# Patient Record
Sex: MALE | Race: WHITE | Employment: OTHER | URBAN - METROPOLITAN AREA
[De-identification: names, ages, dates, MRNs, and addresses within clinical notes are randomized per-mention and may not be internally consistent; named-entity substitution may affect disease eponyms.]

---

## 2020-03-31 ENCOUNTER — HOSPITAL ENCOUNTER (OUTPATIENT)
Dept: CT IMAGING | Age: 70
Discharge: HOME | End: 2020-04-02
Payer: MEDICARE

## 2020-03-31 LAB
CREAT SERPL-MCNC: 0.99 MG/DL (ref 0.7–1.2)
GFR AFRICAN AMERICAN: >60 ML/MIN
GFR NON-AFRICAN AMERICAN: >60 ML/MIN
GFR SERPL CREATININE-BSD FRML MDRD: NORMAL ML/MIN/{1.73_M2}
GFR SERPL CREATININE-BSD FRML MDRD: NORMAL ML/MIN/{1.73_M2}

## 2020-03-31 PROCEDURE — 2580000003 HC RX 258: Performed by: COLON & RECTAL SURGERY

## 2020-03-31 PROCEDURE — 71260 CT THORAX DX C+: CPT

## 2020-03-31 PROCEDURE — 82565 ASSAY OF CREATININE: CPT

## 2020-03-31 PROCEDURE — 6360000004 HC RX CONTRAST MEDICATION: Performed by: COLON & RECTAL SURGERY

## 2020-03-31 PROCEDURE — 36415 COLL VENOUS BLD VENIPUNCTURE: CPT

## 2020-03-31 RX ORDER — 0.9 % SODIUM CHLORIDE 0.9 %
80 INTRAVENOUS SOLUTION INTRAVENOUS ONCE
Status: DISCONTINUED | OUTPATIENT
Start: 2020-03-31 | End: 2020-04-03 | Stop reason: HOSPADM

## 2020-03-31 RX ORDER — SODIUM CHLORIDE 0.9 % (FLUSH) 0.9 %
10 SYRINGE (ML) INJECTION
Status: COMPLETED | OUTPATIENT
Start: 2020-03-31 | End: 2020-03-31

## 2020-03-31 RX ADMIN — Medication 10 ML: at 08:34

## 2020-03-31 RX ADMIN — IOHEXOL 30 ML: 300 INJECTION, SOLUTION INTRAVENOUS at 08:32

## 2020-03-31 RX ADMIN — IOPAMIDOL 75 ML: 755 INJECTION, SOLUTION INTRAVENOUS at 08:32

## 2020-03-31 RX ADMIN — Medication 10 ML: at 08:33

## 2020-05-22 ENCOUNTER — HOSPITAL ENCOUNTER (OUTPATIENT)
Dept: PREADMISSION TESTING | Age: 70
Discharge: HOME | End: 2020-05-26
Payer: MEDICARE

## 2020-05-22 VITALS
OXYGEN SATURATION: 97 % | HEIGHT: 70 IN | WEIGHT: 189 LBS | DIASTOLIC BLOOD PRESSURE: 63 MMHG | HEART RATE: 75 BPM | BODY MASS INDEX: 27.06 KG/M2 | SYSTOLIC BLOOD PRESSURE: 126 MMHG | RESPIRATION RATE: 16 BRPM

## 2020-05-22 LAB
ABO/RH: NORMAL
ANTIBODY SCREEN: NEGATIVE
ARM BAND NUMBER: NORMAL
EXPIRATION DATE: NORMAL
HCT VFR BLD CALC: 49.3 % (ref 40.7–50.3)
HEMOGLOBIN: 16 G/DL (ref 13–17)
MCH RBC QN AUTO: 29.6 PG (ref 25.2–33.5)
MCHC RBC AUTO-ENTMCNC: 32.5 G/DL (ref 28.4–34.8)
MCV RBC AUTO: 91.3 FL (ref 82.6–102.9)
NRBC AUTOMATED: 0 PER 100 WBC
PDW BLD-RTO: 13.4 % (ref 11.8–14.4)
PLATELET # BLD: 226 K/UL (ref 138–453)
PMV BLD AUTO: 10.8 FL (ref 8.1–13.5)
RBC # BLD: 5.4 M/UL (ref 4.21–5.77)
WBC # BLD: 12.8 K/UL (ref 3.5–11.3)

## 2020-05-22 PROCEDURE — 85027 COMPLETE CBC AUTOMATED: CPT

## 2020-05-22 PROCEDURE — 86901 BLOOD TYPING SEROLOGIC RH(D): CPT

## 2020-05-22 PROCEDURE — 93005 ELECTROCARDIOGRAM TRACING: CPT | Performed by: ANESTHESIOLOGY

## 2020-05-22 PROCEDURE — 36415 COLL VENOUS BLD VENIPUNCTURE: CPT

## 2020-05-22 PROCEDURE — 86900 BLOOD TYPING SEROLOGIC ABO: CPT

## 2020-05-22 PROCEDURE — 86850 RBC ANTIBODY SCREEN: CPT

## 2020-05-22 RX ORDER — M-VIT,TX,IRON,MINS/CALC/FOLIC 27MG-0.4MG
1 TABLET ORAL DAILY
COMMUNITY

## 2020-05-22 RX ORDER — ASPIRIN 81 MG/1
81 TABLET, CHEWABLE ORAL DAILY
COMMUNITY

## 2020-05-22 RX ORDER — CLONAZEPAM 0.5 MG/1
0.5 TABLET ORAL 2 TIMES DAILY
COMMUNITY

## 2020-05-22 SDOH — HEALTH STABILITY: MENTAL HEALTH: HOW OFTEN DO YOU HAVE A DRINK CONTAINING ALCOHOL?: NEVER

## 2020-05-22 NOTE — PRE-PROCEDURE INSTRUCTIONS
but do not swallow water.  Bring your eyeglasses and case with you. No contacts are to be worn the day of surgery. You also may bring your hearing aids. Most surgical procedures involving anesthesia will require that you remove your dentures prior to surgery.  If you are on C-PAP or Bi-PAP at home and plan on staying in the hospital overnight for your surgery please bring the machine with you. · Do not wear any jewelry or body piercings day of surgery. Also, NO lotion, perfume or deodorant to be used the day of surgery. No nail polish on the operative extremity (arm/leg surgeries)    · If you are staying overnight with us, please bring a small bag of necessary personal items.  Please wear loose, comfortable clothing. If you are potentially going to have a cast or brace bring clothing that will fit over them.  In case of illness - If you have cold or flu like symptoms (high fever, runny nose, sore throat, cough, etc.) rash, nausea, vomiting, loose stools, and/or recent contact with someone who has a contagious disease (chicken pox, measles, etc.) Please call your doctor before coming to the hospital.         Day of Surgery/Procedure:    As a patient at [facility] - INPATIENT you can expect quality medical and nursing care that is centered on your individual needs. Our goal is to make your surgical experience as comfortable as possible    . Transportation After Your Surgery/Procedure: You will need a friend or family member to drive you home after your procedure. Your  must be 25years of age or older and able to sign off on your discharge instructions. A taxi cab or any other form of public transportation is not acceptable. Your friend or family member must stay at the hospital throughout your procedure.     Someone must remain with you for the first 24 hours after your surgery if you receive anesthesia or medication. If you do not have someone to stay with you, your procedure may be cancelled.       If you have any other questions regarding your procedure or the day of surgery, please call [phone]      _________________________  ____________________________  Signature (Patient)              Signature (Provider) & date

## 2020-05-23 LAB
EKG ATRIAL RATE: 66 BPM
EKG P AXIS: 72 DEGREES
EKG P-R INTERVAL: 152 MS
EKG Q-T INTERVAL: 390 MS
EKG QRS DURATION: 82 MS
EKG QTC CALCULATION (BAZETT): 408 MS
EKG R AXIS: -21 DEGREES
EKG T AXIS: 22 DEGREES
EKG VENTRICULAR RATE: 66 BPM

## 2020-05-23 PROCEDURE — 93010 ELECTROCARDIOGRAM REPORT: CPT | Performed by: INTERNAL MEDICINE

## 2020-05-26 ENCOUNTER — HOSPITAL ENCOUNTER (OUTPATIENT)
Dept: PREADMISSION TESTING | Age: 70
Setting detail: SPECIMEN
Discharge: HOME | DRG: 331 | End: 2020-05-30
Payer: MEDICARE

## 2020-05-26 LAB
SARS-COV-2, PCR: NORMAL
SARS-COV-2, RAPID: NORMAL
SARS-COV-2: NOT DETECTED
SOURCE: NORMAL

## 2020-05-26 PROCEDURE — U0003 INFECTIOUS AGENT DETECTION BY NUCLEIC ACID (DNA OR RNA); SEVERE ACUTE RESPIRATORY SYNDROME CORONAVIRUS 2 (SARS-COV-2) (CORONAVIRUS DISEASE [COVID-19]), AMPLIFIED PROBE TECHNIQUE, MAKING USE OF HIGH THROUGHPUT TECHNOLOGIES AS DESCRIBED BY CMS-2020-01-R: HCPCS

## 2020-05-27 ENCOUNTER — TELEPHONE (OUTPATIENT)
Dept: PRIMARY CARE CLINIC | Age: 70
End: 2020-05-27

## 2020-05-28 ENCOUNTER — HOSPITAL ENCOUNTER (INPATIENT)
Age: 70
LOS: 6 days | Discharge: HOME | DRG: 331 | End: 2020-06-03
Attending: COLON & RECTAL SURGERY | Admitting: COLON & RECTAL SURGERY
Payer: MEDICARE

## 2020-05-28 ENCOUNTER — ANESTHESIA (OUTPATIENT)
Dept: OPERATING ROOM | Age: 70
DRG: 331 | End: 2020-05-28
Payer: MEDICARE

## 2020-05-28 ENCOUNTER — ANESTHESIA EVENT (OUTPATIENT)
Dept: OPERATING ROOM | Age: 70
DRG: 331 | End: 2020-05-28
Payer: MEDICARE

## 2020-05-28 VITALS — TEMPERATURE: 97.3 F | SYSTOLIC BLOOD PRESSURE: 160 MMHG | DIASTOLIC BLOOD PRESSURE: 81 MMHG | OXYGEN SATURATION: 100 %

## 2020-05-28 PROBLEM — K63.5 COLON POLYPS: Status: ACTIVE | Noted: 2020-05-28

## 2020-05-28 LAB
CARCINOEMBRYONIC ANTIGEN: 3.8 NG/ML
GLUCOSE BLD-MCNC: 107 MG/DL (ref 75–110)
GLUCOSE BLD-MCNC: 82 MG/DL (ref 75–110)

## 2020-05-28 PROCEDURE — 6360000002 HC RX W HCPCS: Performed by: ANESTHESIOLOGY

## 2020-05-28 PROCEDURE — 2060000000 HC ICU INTERMEDIATE R&B

## 2020-05-28 PROCEDURE — 88341 IMHCHEM/IMCYTCHM EA ADD ANTB: CPT

## 2020-05-28 PROCEDURE — 3600000002 HC SURGERY LEVEL 2 BASE: Performed by: COLON & RECTAL SURGERY

## 2020-05-28 PROCEDURE — 6360000002 HC RX W HCPCS: Performed by: COLON & RECTAL SURGERY

## 2020-05-28 PROCEDURE — 2580000003 HC RX 258: Performed by: ANESTHESIOLOGY

## 2020-05-28 PROCEDURE — 2580000003 HC RX 258: Performed by: COLON & RECTAL SURGERY

## 2020-05-28 PROCEDURE — 88342 IMHCHEM/IMCYTCHM 1ST ANTB: CPT

## 2020-05-28 PROCEDURE — 7100000000 HC PACU RECOVERY - FIRST 15 MIN: Performed by: COLON & RECTAL SURGERY

## 2020-05-28 PROCEDURE — 3600000012 HC SURGERY LEVEL 2 ADDTL 15MIN: Performed by: COLON & RECTAL SURGERY

## 2020-05-28 PROCEDURE — 6360000002 HC RX W HCPCS: Performed by: NURSE ANESTHETIST, CERTIFIED REGISTERED

## 2020-05-28 PROCEDURE — 64488 TAP BLOCK BI INJECTION: CPT | Performed by: ANESTHESIOLOGY

## 2020-05-28 PROCEDURE — 82378 CARCINOEMBRYONIC ANTIGEN: CPT

## 2020-05-28 PROCEDURE — 88309 TISSUE EXAM BY PATHOLOGIST: CPT

## 2020-05-28 PROCEDURE — 2500000003 HC RX 250 WO HCPCS: Performed by: NURSE ANESTHETIST, CERTIFIED REGISTERED

## 2020-05-28 PROCEDURE — 2720000010 HC SURG SUPPLY STERILE: Performed by: COLON & RECTAL SURGERY

## 2020-05-28 PROCEDURE — 82947 ASSAY GLUCOSE BLOOD QUANT: CPT

## 2020-05-28 PROCEDURE — 3700000001 HC ADD 15 MINUTES (ANESTHESIA): Performed by: COLON & RECTAL SURGERY

## 2020-05-28 PROCEDURE — 2709999900 HC NON-CHARGEABLE SUPPLY: Performed by: COLON & RECTAL SURGERY

## 2020-05-28 PROCEDURE — 3700000000 HC ANESTHESIA ATTENDED CARE: Performed by: COLON & RECTAL SURGERY

## 2020-05-28 PROCEDURE — 2500000003 HC RX 250 WO HCPCS: Performed by: COLON & RECTAL SURGERY

## 2020-05-28 PROCEDURE — 7100000001 HC PACU RECOVERY - ADDTL 15 MIN: Performed by: COLON & RECTAL SURGERY

## 2020-05-28 PROCEDURE — 0DQ80ZZ REPAIR SMALL INTESTINE, OPEN APPROACH: ICD-10-PCS | Performed by: COLON & RECTAL SURGERY

## 2020-05-28 RX ORDER — ONDANSETRON 2 MG/ML
4 INJECTION INTRAMUSCULAR; INTRAVENOUS
Status: DISCONTINUED | OUTPATIENT
Start: 2020-05-28 | End: 2020-05-28 | Stop reason: HOSPADM

## 2020-05-28 RX ORDER — OXYCODONE HYDROCHLORIDE AND ACETAMINOPHEN 5; 325 MG/1; MG/1
1 TABLET ORAL PRN
Status: DISCONTINUED | OUTPATIENT
Start: 2020-05-28 | End: 2020-05-28 | Stop reason: HOSPADM

## 2020-05-28 RX ORDER — SODIUM CHLORIDE 0.9 % (FLUSH) 0.9 %
10 SYRINGE (ML) INJECTION EVERY 12 HOURS SCHEDULED
Status: DISCONTINUED | OUTPATIENT
Start: 2020-05-28 | End: 2020-05-28 | Stop reason: SDUPTHER

## 2020-05-28 RX ORDER — GLYCOPYRROLATE 1 MG/5 ML
SYRINGE (ML) INTRAVENOUS PRN
Status: DISCONTINUED | OUTPATIENT
Start: 2020-05-28 | End: 2020-05-28 | Stop reason: SDUPTHER

## 2020-05-28 RX ORDER — NEOSTIGMINE METHYLSULFATE 5 MG/5 ML
SYRINGE (ML) INTRAVENOUS PRN
Status: DISCONTINUED | OUTPATIENT
Start: 2020-05-28 | End: 2020-05-28 | Stop reason: SDUPTHER

## 2020-05-28 RX ORDER — FENTANYL CITRATE 50 UG/ML
INJECTION, SOLUTION INTRAMUSCULAR; INTRAVENOUS PRN
Status: DISCONTINUED | OUTPATIENT
Start: 2020-05-28 | End: 2020-05-28 | Stop reason: SDUPTHER

## 2020-05-28 RX ORDER — ONDANSETRON 2 MG/ML
INJECTION INTRAMUSCULAR; INTRAVENOUS PRN
Status: DISCONTINUED | OUTPATIENT
Start: 2020-05-28 | End: 2020-05-28 | Stop reason: SDUPTHER

## 2020-05-28 RX ORDER — FENTANYL CITRATE 50 UG/ML
25 INJECTION, SOLUTION INTRAMUSCULAR; INTRAVENOUS EVERY 5 MIN PRN
Status: DISCONTINUED | OUTPATIENT
Start: 2020-05-28 | End: 2020-05-28 | Stop reason: HOSPADM

## 2020-05-28 RX ORDER — CLONAZEPAM 0.5 MG/1
0.5 TABLET ORAL NIGHTLY PRN
Status: DISCONTINUED | OUTPATIENT
Start: 2020-05-28 | End: 2020-05-29

## 2020-05-28 RX ORDER — PROPOFOL 10 MG/ML
INJECTION, EMULSION INTRAVENOUS PRN
Status: DISCONTINUED | OUTPATIENT
Start: 2020-05-28 | End: 2020-05-28 | Stop reason: SDUPTHER

## 2020-05-28 RX ORDER — ROCURONIUM BROMIDE 10 MG/ML
INJECTION, SOLUTION INTRAVENOUS PRN
Status: DISCONTINUED | OUTPATIENT
Start: 2020-05-28 | End: 2020-05-28 | Stop reason: SDUPTHER

## 2020-05-28 RX ORDER — OXYCODONE HYDROCHLORIDE AND ACETAMINOPHEN 5; 325 MG/1; MG/1
2 TABLET ORAL PRN
Status: DISCONTINUED | OUTPATIENT
Start: 2020-05-28 | End: 2020-05-28 | Stop reason: HOSPADM

## 2020-05-28 RX ORDER — CEFAZOLIN SODIUM 2 G/50ML
2 SOLUTION INTRAVENOUS ONCE
Status: COMPLETED | OUTPATIENT
Start: 2020-05-28 | End: 2020-05-28

## 2020-05-28 RX ORDER — SODIUM CHLORIDE, SODIUM LACTATE, POTASSIUM CHLORIDE, CALCIUM CHLORIDE 600; 310; 30; 20 MG/100ML; MG/100ML; MG/100ML; MG/100ML
INJECTION, SOLUTION INTRAVENOUS CONTINUOUS
Status: DISCONTINUED | OUTPATIENT
Start: 2020-05-28 | End: 2020-05-28 | Stop reason: SDUPTHER

## 2020-05-28 RX ORDER — SODIUM CHLORIDE, SODIUM LACTATE, POTASSIUM CHLORIDE, CALCIUM CHLORIDE 600; 310; 30; 20 MG/100ML; MG/100ML; MG/100ML; MG/100ML
INJECTION, SOLUTION INTRAVENOUS CONTINUOUS
Status: DISCONTINUED | OUTPATIENT
Start: 2020-05-28 | End: 2020-06-01

## 2020-05-28 RX ORDER — SODIUM CHLORIDE 0.9 % (FLUSH) 0.9 %
10 SYRINGE (ML) INJECTION PRN
Status: DISCONTINUED | OUTPATIENT
Start: 2020-05-28 | End: 2020-05-28 | Stop reason: SDUPTHER

## 2020-05-28 RX ORDER — LIDOCAINE HYDROCHLORIDE 10 MG/ML
1 INJECTION, SOLUTION EPIDURAL; INFILTRATION; INTRACAUDAL; PERINEURAL
Status: DISCONTINUED | OUTPATIENT
Start: 2020-05-29 | End: 2020-05-28 | Stop reason: HOSPADM

## 2020-05-28 RX ORDER — LABETALOL HYDROCHLORIDE 5 MG/ML
5 INJECTION, SOLUTION INTRAVENOUS EVERY 10 MIN PRN
Status: DISCONTINUED | OUTPATIENT
Start: 2020-05-28 | End: 2020-05-28 | Stop reason: HOSPADM

## 2020-05-28 RX ORDER — HYDROMORPHONE HCL 110MG/55ML
0.5 PATIENT CONTROLLED ANALGESIA SYRINGE INTRAVENOUS EVERY 5 MIN PRN
Status: DISCONTINUED | OUTPATIENT
Start: 2020-05-28 | End: 2020-05-28 | Stop reason: HOSPADM

## 2020-05-28 RX ORDER — DEXAMETHASONE SODIUM PHOSPHATE 10 MG/ML
INJECTION INTRAMUSCULAR; INTRAVENOUS PRN
Status: DISCONTINUED | OUTPATIENT
Start: 2020-05-28 | End: 2020-05-28 | Stop reason: SDUPTHER

## 2020-05-28 RX ORDER — SODIUM CHLORIDE, SODIUM LACTATE, POTASSIUM CHLORIDE, CALCIUM CHLORIDE 600; 310; 30; 20 MG/100ML; MG/100ML; MG/100ML; MG/100ML
INJECTION, SOLUTION INTRAVENOUS CONTINUOUS
Status: DISCONTINUED | OUTPATIENT
Start: 2020-05-29 | End: 2020-06-01

## 2020-05-28 RX ORDER — CEFAZOLIN SODIUM 2 G/50ML
2 SOLUTION INTRAVENOUS ONCE
Status: DISCONTINUED | OUTPATIENT
Start: 2020-05-28 | End: 2020-05-28 | Stop reason: SDUPTHER

## 2020-05-28 RX ORDER — SODIUM CHLORIDE 0.9 % (FLUSH) 0.9 %
10 SYRINGE (ML) INJECTION PRN
Status: DISCONTINUED | OUTPATIENT
Start: 2020-05-28 | End: 2020-06-03 | Stop reason: HOSPADM

## 2020-05-28 RX ORDER — HYDRALAZINE HYDROCHLORIDE 20 MG/ML
5 INJECTION INTRAMUSCULAR; INTRAVENOUS EVERY 10 MIN PRN
Status: DISCONTINUED | OUTPATIENT
Start: 2020-05-28 | End: 2020-05-28 | Stop reason: HOSPADM

## 2020-05-28 RX ORDER — PHENYLEPHRINE HCL IN 0.9% NACL 1 MG/10 ML
SYRINGE (ML) INTRAVENOUS PRN
Status: DISCONTINUED | OUTPATIENT
Start: 2020-05-28 | End: 2020-05-28 | Stop reason: SDUPTHER

## 2020-05-28 RX ORDER — MIDAZOLAM HYDROCHLORIDE 1 MG/ML
INJECTION INTRAMUSCULAR; INTRAVENOUS PRN
Status: DISCONTINUED | OUTPATIENT
Start: 2020-05-28 | End: 2020-05-28 | Stop reason: SDUPTHER

## 2020-05-28 RX ORDER — ROPIVACAINE HYDROCHLORIDE 2 MG/ML
INJECTION, SOLUTION EPIDURAL; INFILTRATION; PERINEURAL PRN
Status: DISCONTINUED | OUTPATIENT
Start: 2020-05-28 | End: 2020-05-28 | Stop reason: SDUPTHER

## 2020-05-28 RX ORDER — LIDOCAINE HYDROCHLORIDE 20 MG/ML
INJECTION, SOLUTION EPIDURAL; INFILTRATION; INTRACAUDAL; PERINEURAL PRN
Status: DISCONTINUED | OUTPATIENT
Start: 2020-05-28 | End: 2020-05-28 | Stop reason: SDUPTHER

## 2020-05-28 RX ORDER — PROMETHAZINE HYDROCHLORIDE 25 MG/ML
6.25 INJECTION, SOLUTION INTRAMUSCULAR; INTRAVENOUS
Status: DISCONTINUED | OUTPATIENT
Start: 2020-05-28 | End: 2020-05-28 | Stop reason: HOSPADM

## 2020-05-28 RX ORDER — SODIUM CHLORIDE 0.9 % (FLUSH) 0.9 %
10 SYRINGE (ML) INJECTION EVERY 12 HOURS SCHEDULED
Status: DISCONTINUED | OUTPATIENT
Start: 2020-05-28 | End: 2020-06-03 | Stop reason: HOSPADM

## 2020-05-28 RX ADMIN — ROPIVACAINE HYDROCHLORIDE 25 ML: 2 INJECTION, SOLUTION EPIDURAL; INFILTRATION at 15:07

## 2020-05-28 RX ADMIN — Medication 100 MCG: at 12:48

## 2020-05-28 RX ADMIN — CEFAZOLIN SODIUM 2 G: 2 SOLUTION INTRAVENOUS at 12:25

## 2020-05-28 RX ADMIN — SODIUM CHLORIDE, POTASSIUM CHLORIDE, SODIUM LACTATE AND CALCIUM CHLORIDE: 600; 310; 30; 20 INJECTION, SOLUTION INTRAVENOUS at 16:45

## 2020-05-28 RX ADMIN — DEXAMETHASONE SODIUM PHOSPHATE 10 MG: 10 INJECTION INTRAMUSCULAR; INTRAVENOUS at 12:32

## 2020-05-28 RX ADMIN — HYDROMORPHONE HYDROCHLORIDE 0.5 MG: 2 INJECTION INTRAMUSCULAR; INTRAVENOUS; SUBCUTANEOUS at 15:48

## 2020-05-28 RX ADMIN — SODIUM CHLORIDE, POTASSIUM CHLORIDE, SODIUM LACTATE AND CALCIUM CHLORIDE: 600; 310; 30; 20 INJECTION, SOLUTION INTRAVENOUS at 15:01

## 2020-05-28 RX ADMIN — FENTANYL CITRATE 50 MCG: 50 INJECTION, SOLUTION INTRAMUSCULAR; INTRAVENOUS at 14:16

## 2020-05-28 RX ADMIN — Medication 100 MCG: at 13:00

## 2020-05-28 RX ADMIN — HYDROMORPHONE HYDROCHLORIDE 0.5 MG: 2 INJECTION INTRAMUSCULAR; INTRAVENOUS; SUBCUTANEOUS at 15:40

## 2020-05-28 RX ADMIN — MIDAZOLAM 2 MG: 1 INJECTION INTRAMUSCULAR; INTRAVENOUS at 12:14

## 2020-05-28 RX ADMIN — SODIUM CHLORIDE, POTASSIUM CHLORIDE, SODIUM LACTATE AND CALCIUM CHLORIDE: 600; 310; 30; 20 INJECTION, SOLUTION INTRAVENOUS at 12:46

## 2020-05-28 RX ADMIN — Medication 100 MCG: at 12:38

## 2020-05-28 RX ADMIN — ROCURONIUM BROMIDE 20 MG: 10 INJECTION, SOLUTION INTRAVENOUS at 13:34

## 2020-05-28 RX ADMIN — Medication 3 MG: at 15:07

## 2020-05-28 RX ADMIN — SODIUM CHLORIDE, POTASSIUM CHLORIDE, SODIUM LACTATE AND CALCIUM CHLORIDE: 600; 310; 30; 20 INJECTION, SOLUTION INTRAVENOUS at 12:14

## 2020-05-28 RX ADMIN — Medication 200 MCG: at 12:52

## 2020-05-28 RX ADMIN — FENTANYL CITRATE 150 MCG: 50 INJECTION, SOLUTION INTRAMUSCULAR; INTRAVENOUS at 12:19

## 2020-05-28 RX ADMIN — PROPOFOL 200 MG: 10 INJECTION, EMULSION INTRAVENOUS at 12:19

## 2020-05-28 RX ADMIN — METRONIDAZOLE 500 MG: 500 INJECTION, SOLUTION INTRAVENOUS at 12:31

## 2020-05-28 RX ADMIN — CEFOXITIN SODIUM 2 G: 2 POWDER, FOR SOLUTION INTRAVENOUS at 19:51

## 2020-05-28 RX ADMIN — ROPIVACAINE HYDROCHLORIDE 25 ML: 2 INJECTION, SOLUTION EPIDURAL; INFILTRATION at 15:04

## 2020-05-28 RX ADMIN — Medication 0.4 MG: at 15:07

## 2020-05-28 RX ADMIN — SODIUM CHLORIDE, POTASSIUM CHLORIDE, SODIUM LACTATE AND CALCIUM CHLORIDE: 600; 310; 30; 20 INJECTION, SOLUTION INTRAVENOUS at 23:34

## 2020-05-28 RX ADMIN — HYDROMORPHONE HYDROCHLORIDE 0.5 MG: 1 INJECTION, SOLUTION INTRAMUSCULAR; INTRAVENOUS; SUBCUTANEOUS at 19:48

## 2020-05-28 RX ADMIN — FENTANYL CITRATE 50 MCG: 50 INJECTION, SOLUTION INTRAMUSCULAR; INTRAVENOUS at 13:05

## 2020-05-28 RX ADMIN — HYDROMORPHONE HYDROCHLORIDE 0.5 MG: 2 INJECTION INTRAMUSCULAR; INTRAVENOUS; SUBCUTANEOUS at 16:14

## 2020-05-28 RX ADMIN — ROCURONIUM BROMIDE 50 MG: 10 INJECTION, SOLUTION INTRAVENOUS at 12:19

## 2020-05-28 RX ADMIN — Medication 100 MCG: at 12:23

## 2020-05-28 RX ADMIN — ONDANSETRON 4 MG: 2 INJECTION, SOLUTION INTRAMUSCULAR; INTRAVENOUS at 12:32

## 2020-05-28 RX ADMIN — HYDROMORPHONE HYDROCHLORIDE 0.25 MG: 1 INJECTION, SOLUTION INTRAMUSCULAR; INTRAVENOUS; SUBCUTANEOUS at 23:29

## 2020-05-28 RX ADMIN — Medication 0.2 MG: at 12:47

## 2020-05-28 RX ADMIN — LIDOCAINE HYDROCHLORIDE 100 MG: 20 INJECTION, SOLUTION EPIDURAL; INFILTRATION; INTRACAUDAL; PERINEURAL at 12:19

## 2020-05-28 ASSESSMENT — PULMONARY FUNCTION TESTS
PIF_VALUE: 17
PIF_VALUE: 19
PIF_VALUE: 14
PIF_VALUE: 19
PIF_VALUE: 17
PIF_VALUE: 18
PIF_VALUE: 19
PIF_VALUE: 1
PIF_VALUE: 1
PIF_VALUE: 18
PIF_VALUE: 17
PIF_VALUE: 2
PIF_VALUE: 16
PIF_VALUE: 1
PIF_VALUE: 17
PIF_VALUE: 21
PIF_VALUE: 18
PIF_VALUE: 18
PIF_VALUE: 19
PIF_VALUE: 17
PIF_VALUE: 19
PIF_VALUE: 18
PIF_VALUE: 17
PIF_VALUE: 18
PIF_VALUE: 18
PIF_VALUE: 1
PIF_VALUE: 17
PIF_VALUE: 18
PIF_VALUE: 19
PIF_VALUE: 19
PIF_VALUE: 15
PIF_VALUE: 19
PIF_VALUE: 15
PIF_VALUE: 19
PIF_VALUE: 15
PIF_VALUE: 18
PIF_VALUE: 20
PIF_VALUE: 15
PIF_VALUE: 16
PIF_VALUE: 19
PIF_VALUE: 15
PIF_VALUE: 19
PIF_VALUE: 19
PIF_VALUE: 15
PIF_VALUE: 20
PIF_VALUE: 26
PIF_VALUE: 16
PIF_VALUE: 17
PIF_VALUE: 19
PIF_VALUE: 19
PIF_VALUE: 16
PIF_VALUE: 18
PIF_VALUE: 17
PIF_VALUE: 15
PIF_VALUE: 19
PIF_VALUE: 17
PIF_VALUE: 15
PIF_VALUE: 18
PIF_VALUE: 18
PIF_VALUE: 17
PIF_VALUE: 14
PIF_VALUE: 20
PIF_VALUE: 19
PIF_VALUE: 17
PIF_VALUE: 9
PIF_VALUE: 19
PIF_VALUE: 18
PIF_VALUE: 17
PIF_VALUE: 15
PIF_VALUE: 19
PIF_VALUE: 19
PIF_VALUE: 20
PIF_VALUE: 19
PIF_VALUE: 17
PIF_VALUE: 19
PIF_VALUE: 18
PIF_VALUE: 16
PIF_VALUE: 15
PIF_VALUE: 14
PIF_VALUE: 15
PIF_VALUE: 17
PIF_VALUE: 19
PIF_VALUE: 16
PIF_VALUE: 14
PIF_VALUE: 17
PIF_VALUE: 15
PIF_VALUE: 1
PIF_VALUE: 14
PIF_VALUE: 19
PIF_VALUE: 14
PIF_VALUE: 19
PIF_VALUE: 19
PIF_VALUE: 17
PIF_VALUE: 18
PIF_VALUE: 17
PIF_VALUE: 19
PIF_VALUE: 14
PIF_VALUE: 15
PIF_VALUE: 17
PIF_VALUE: 19
PIF_VALUE: 18
PIF_VALUE: 19
PIF_VALUE: 15
PIF_VALUE: 1
PIF_VALUE: 19
PIF_VALUE: 18
PIF_VALUE: 14
PIF_VALUE: 19
PIF_VALUE: 16
PIF_VALUE: 19
PIF_VALUE: 18
PIF_VALUE: 19
PIF_VALUE: 17
PIF_VALUE: 19
PIF_VALUE: 15
PIF_VALUE: 18
PIF_VALUE: 16
PIF_VALUE: 17
PIF_VALUE: 19
PIF_VALUE: 20
PIF_VALUE: 8
PIF_VALUE: 16
PIF_VALUE: 17
PIF_VALUE: 16
PIF_VALUE: 15
PIF_VALUE: 17
PIF_VALUE: 18
PIF_VALUE: 17
PIF_VALUE: 19
PIF_VALUE: 19
PIF_VALUE: 3
PIF_VALUE: 14
PIF_VALUE: 17
PIF_VALUE: 16
PIF_VALUE: 15
PIF_VALUE: 18
PIF_VALUE: 17
PIF_VALUE: 17
PIF_VALUE: 20
PIF_VALUE: 19
PIF_VALUE: 18
PIF_VALUE: 15
PIF_VALUE: 18
PIF_VALUE: 17
PIF_VALUE: 19
PIF_VALUE: 18
PIF_VALUE: 0
PIF_VALUE: 20
PIF_VALUE: 18
PIF_VALUE: 18
PIF_VALUE: 15
PIF_VALUE: 15
PIF_VALUE: 17
PIF_VALUE: 19
PIF_VALUE: 0
PIF_VALUE: 17
PIF_VALUE: 34
PIF_VALUE: 19
PIF_VALUE: 17
PIF_VALUE: 16
PIF_VALUE: 19
PIF_VALUE: 10
PIF_VALUE: 15
PIF_VALUE: 19
PIF_VALUE: 19
PIF_VALUE: 15
PIF_VALUE: 17

## 2020-05-28 ASSESSMENT — PAIN DESCRIPTION - LOCATION: LOCATION: ABDOMEN

## 2020-05-28 ASSESSMENT — PAIN SCALES - GENERAL
PAINLEVEL_OUTOF10: 10
PAINLEVEL_OUTOF10: 6

## 2020-05-28 ASSESSMENT — PAIN DESCRIPTION - FREQUENCY: FREQUENCY: CONTINUOUS

## 2020-05-28 ASSESSMENT — PAIN DESCRIPTION - ONSET: ONSET: ON-GOING

## 2020-05-28 ASSESSMENT — PAIN DESCRIPTION - PROGRESSION: CLINICAL_PROGRESSION: GRADUALLY WORSENING

## 2020-05-28 ASSESSMENT — PAIN DESCRIPTION - DESCRIPTORS: DESCRIPTORS: ACHING

## 2020-05-28 ASSESSMENT — PAIN DESCRIPTION - PAIN TYPE: TYPE: SURGICAL PAIN

## 2020-05-28 ASSESSMENT — LIFESTYLE VARIABLES: SMOKING_STATUS: 1

## 2020-05-29 LAB
ANION GAP SERPL CALCULATED.3IONS-SCNC: 12 MMOL/L (ref 9–17)
BUN BLDV-MCNC: 13 MG/DL (ref 8–23)
BUN/CREAT BLD: 14 (ref 9–20)
CALCIUM SERPL-MCNC: 8.5 MG/DL (ref 8.6–10.4)
CHLORIDE BLD-SCNC: 103 MMOL/L (ref 98–107)
CO2: 21 MMOL/L (ref 20–31)
CREAT SERPL-MCNC: 0.93 MG/DL (ref 0.7–1.2)
GFR AFRICAN AMERICAN: >60 ML/MIN
GFR NON-AFRICAN AMERICAN: >60 ML/MIN
GFR SERPL CREATININE-BSD FRML MDRD: ABNORMAL ML/MIN/{1.73_M2}
GFR SERPL CREATININE-BSD FRML MDRD: ABNORMAL ML/MIN/{1.73_M2}
GLUCOSE BLD-MCNC: 116 MG/DL (ref 70–99)
HCT VFR BLD CALC: 45.4 % (ref 40.7–50.3)
HEMOGLOBIN: 14.9 G/DL (ref 13–17)
MCH RBC QN AUTO: 29.9 PG (ref 25.2–33.5)
MCHC RBC AUTO-ENTMCNC: 32.8 G/DL (ref 28.4–34.8)
MCV RBC AUTO: 91 FL (ref 82.6–102.9)
NRBC AUTOMATED: 0 PER 100 WBC
PDW BLD-RTO: 13.2 % (ref 11.8–14.4)
PLATELET # BLD: 193 K/UL (ref 138–453)
PMV BLD AUTO: 10.9 FL (ref 8.1–13.5)
POTASSIUM SERPL-SCNC: 4.9 MMOL/L (ref 3.7–5.3)
RBC # BLD: 4.99 M/UL (ref 4.21–5.77)
SODIUM BLD-SCNC: 136 MMOL/L (ref 135–144)
WBC # BLD: 20.7 K/UL (ref 3.5–11.3)

## 2020-05-29 PROCEDURE — 6360000002 HC RX W HCPCS: Performed by: COLON & RECTAL SURGERY

## 2020-05-29 PROCEDURE — 51798 US URINE CAPACITY MEASURE: CPT

## 2020-05-29 PROCEDURE — 2580000003 HC RX 258: Performed by: COLON & RECTAL SURGERY

## 2020-05-29 PROCEDURE — 2580000003 HC RX 258: Performed by: ANESTHESIOLOGY

## 2020-05-29 PROCEDURE — 80048 BASIC METABOLIC PNL TOTAL CA: CPT

## 2020-05-29 PROCEDURE — 97530 THERAPEUTIC ACTIVITIES: CPT

## 2020-05-29 PROCEDURE — 97166 OT EVAL MOD COMPLEX 45 MIN: CPT

## 2020-05-29 PROCEDURE — 97535 SELF CARE MNGMENT TRAINING: CPT

## 2020-05-29 PROCEDURE — 6370000000 HC RX 637 (ALT 250 FOR IP): Performed by: COLON & RECTAL SURGERY

## 2020-05-29 PROCEDURE — 97116 GAIT TRAINING THERAPY: CPT

## 2020-05-29 PROCEDURE — 36415 COLL VENOUS BLD VENIPUNCTURE: CPT

## 2020-05-29 PROCEDURE — 85027 COMPLETE CBC AUTOMATED: CPT

## 2020-05-29 PROCEDURE — 2060000000 HC ICU INTERMEDIATE R&B

## 2020-05-29 PROCEDURE — 97162 PT EVAL MOD COMPLEX 30 MIN: CPT

## 2020-05-29 RX ORDER — NICOTINE 21 MG/24HR
1 PATCH, TRANSDERMAL 24 HOURS TRANSDERMAL DAILY
Status: DISCONTINUED | OUTPATIENT
Start: 2020-05-29 | End: 2020-06-03 | Stop reason: HOSPADM

## 2020-05-29 RX ORDER — CLONAZEPAM 0.5 MG/1
0.5 TABLET ORAL 2 TIMES DAILY PRN
Status: DISCONTINUED | OUTPATIENT
Start: 2020-05-29 | End: 2020-06-03 | Stop reason: HOSPADM

## 2020-05-29 RX ADMIN — HYDROMORPHONE HYDROCHLORIDE 0.5 MG: 1 INJECTION, SOLUTION INTRAMUSCULAR; INTRAVENOUS; SUBCUTANEOUS at 07:23

## 2020-05-29 RX ADMIN — CEFOXITIN SODIUM 2 G: 2 POWDER, FOR SOLUTION INTRAVENOUS at 00:27

## 2020-05-29 RX ADMIN — HYDROMORPHONE HYDROCHLORIDE 0.5 MG: 1 INJECTION, SOLUTION INTRAMUSCULAR; INTRAVENOUS; SUBCUTANEOUS at 03:45

## 2020-05-29 RX ADMIN — HYDROMORPHONE HYDROCHLORIDE 0.5 MG: 1 INJECTION, SOLUTION INTRAMUSCULAR; INTRAVENOUS; SUBCUTANEOUS at 11:01

## 2020-05-29 RX ADMIN — HYDROMORPHONE HYDROCHLORIDE 0.5 MG: 1 INJECTION, SOLUTION INTRAMUSCULAR; INTRAVENOUS; SUBCUTANEOUS at 14:03

## 2020-05-29 RX ADMIN — CLONAZEPAM 0.5 MG: 0.5 TABLET ORAL at 21:11

## 2020-05-29 RX ADMIN — CEFOXITIN SODIUM 2 G: 2 POWDER, FOR SOLUTION INTRAVENOUS at 05:45

## 2020-05-29 RX ADMIN — HYDROMORPHONE HYDROCHLORIDE 0.5 MG: 1 INJECTION, SOLUTION INTRAMUSCULAR; INTRAVENOUS; SUBCUTANEOUS at 20:45

## 2020-05-29 RX ADMIN — HYDROMORPHONE HYDROCHLORIDE 0.5 MG: 1 INJECTION, SOLUTION INTRAMUSCULAR; INTRAVENOUS; SUBCUTANEOUS at 19:36

## 2020-05-29 RX ADMIN — CLONAZEPAM 0.5 MG: 0.5 TABLET ORAL at 15:47

## 2020-05-29 RX ADMIN — SODIUM CHLORIDE, POTASSIUM CHLORIDE, SODIUM LACTATE AND CALCIUM CHLORIDE: 600; 310; 30; 20 INJECTION, SOLUTION INTRAVENOUS at 00:27

## 2020-05-29 ASSESSMENT — PAIN DESCRIPTION - FREQUENCY
FREQUENCY: CONTINUOUS

## 2020-05-29 ASSESSMENT — PAIN DESCRIPTION - LOCATION
LOCATION: ABDOMEN

## 2020-05-29 ASSESSMENT — PAIN SCALES - GENERAL
PAINLEVEL_OUTOF10: 10
PAINLEVEL_OUTOF10: 10
PAINLEVEL_OUTOF10: 7
PAINLEVEL_OUTOF10: 7
PAINLEVEL_OUTOF10: 10
PAINLEVEL_OUTOF10: 9
PAINLEVEL_OUTOF10: 0
PAINLEVEL_OUTOF10: 10
PAINLEVEL_OUTOF10: 10

## 2020-05-29 ASSESSMENT — PAIN DESCRIPTION - DESCRIPTORS
DESCRIPTORS: CRAMPING;SORE
DESCRIPTORS: CRAMPING;SORE
DESCRIPTORS: ACHING

## 2020-05-29 ASSESSMENT — PAIN DESCRIPTION - ONSET
ONSET: ON-GOING

## 2020-05-29 ASSESSMENT — PAIN DESCRIPTION - PROGRESSION: CLINICAL_PROGRESSION: GRADUALLY WORSENING

## 2020-05-29 ASSESSMENT — PAIN DESCRIPTION - PAIN TYPE
TYPE: SURGICAL PAIN

## 2020-05-29 NOTE — PLAN OF CARE
Problem: Bowel/Gastric:  Goal: Control of bowel function will improve  Description: Control of bowel function will improve  Outcome: Ongoing  Goal: Ability to achieve a regular elimination pattern will improve  Description: Ability to achieve a regular elimination pattern will improve  Outcome: Ongoing     Problem: Nutritional:  Goal: Ability to follow a diet with enough fiber (20 to 30 grams) for normal bowel function will improve  Description: Ability to follow a diet with enough fiber (20 to 30 grams) for normal bowel function will improve  Outcome: Ongoing  Goal: Nutritional status will improve  Description: Nutritional status will improve  Outcome: Ongoing  Pt is NPO with ice chips. No Nausea or vomiting with ice chips post NG removal. Will continue to monitor       Problem: Skin Integrity:  Goal: Risk for impaired skin integrity will decrease  Description: Risk for impaired skin integrity will decrease  5/29/2020 1220 by Lydia Lesches, RN  Outcome: Ongoing    Goal: Demonstration of wound healing without infection will improve  Description: Demonstration of wound healing without infection will improve  Outcome: Ongoing  Goal: Complications related to intravenous access or infusion will be avoided or minimized  Description: Complications related to intravenous access or infusion will be avoided or minimized  Outcome: Ongoing     Problem: Pain:  Goal: Pain level will decrease  Description: Pain level will decrease  5/29/2020 1220 by Lydia Lesches, RN  Outcome: Ongoing    Goal: Control of acute pain  Description: Control of acute pain  5/29/2020 1220 by Lydia Lesches, RN  Outcome: Ongoing  Pt is NPO.  IV dilaudid 0.25-0.5mg Q3     Problem: Physical Regulation:  Goal: Diagnostic test results will improve  Description: Diagnostic test results will improve  Outcome: Ongoing  Goal: Will remain free from infection  Description: Will remain free from infection  Outcome: Ongoing  Patient educated on importance of Hibiclens

## 2020-05-29 NOTE — PROGRESS NOTES
colectomy  Follows Commands: Within Functional Limits  General Comment  Comments: Anni Foster RN reports patient appropriate for PT. Subjective  Subjective: Patient pleasant and agreeable to PT, patient reports he feels like he needs to cough, but is too painful to take a deep breath. Pain Screening  Patient Currently in Pain: Yes     Pre Treatment Pain Screening  Intervention List: Patient able to continue with treatment    Orientation  Orientation  Overall Orientation Status: Within Normal Limits  Social/Functional History  Social/Functional History  Lives With: Spouse  Type of Home: House  Home Layout: One level  Home Access: Stairs to enter with rails(side door )  Entrance Stairs - Number of Steps: 5  Entrance Stairs - Rails: Both  Bathroom Shower/Tub: Tub/Shower unit  Bathroom Toilet: Standard  Bathroom Equipment: (no DME )  Home Equipment: Cane(cedar cane )  Receives Help From: Family(Pt states his spouse and all family is supportive )  ADL Assistance: Independent  Homemaking Assistance: Needs assistance(Pt states his spouse completes all IADLS )  Homemaking Responsibilities: No  Ambulation Assistance: Independent  Transfer Assistance: Independent  Active : Yes  Occupation: Retired  Type of occupation: power    Leisure & Hobbies: involved in 4 grandchildren's sports activities   Additional Comments: Pt denies recent falls. Cognition   Cognition  Arousal/Alertness: Appropriate responses to stimuli  Following Commands:  Follows all commands without difficulty  Attention Span: Appears intact  Memory: Appears intact  Safety Judgement: Decreased awareness of need for safety  Problem Solving: Assistance required to identify errors made;Assistance required to correct errors made;Decreased awareness of errors  Insights: Fully aware of deficits  Initiation: Does not require cues  Sequencing: Does not require cues    Objective     Observation/Palpation  Posture: Good  Observation: abd incisision and with jayjay. AROM RLE (degrees)  RLE AROM: WFL  AROM LLE (degrees)  LLE AROM : WFL  Strength RLE  Strength RLE: WFL  Strength LLE  Strength LLE: WFL  Tone RLE  RLE Tone: Normotonic  Tone LLE  LLE Tone: Normotonic  Motor Control  Gross Motor?: WFL     Bed mobility  Rolling to Left: Minimal assistance  Rolling to Right: Minimal assistance  Supine to Sit: Minimal assistance  Comment: Patient required verbal cues and min assist, mostly due to abdominal pain. Transfers  Sit to Stand: Stand by assistance  Stand to sit: Stand by assistance  Bed to Chair: Stand by assistance  Stand Pivot Transfers: Stand by assistance  Comment: Patient steady with transfers. Ambulation  Ambulation?: Yes  Ambulation 1  Surface: level tile  Device: No Device  Assistance: Stand by assistance  Quality of Gait: steady with gait, but moving cautiously due to abdominal pain. Gait Deviations: Slow Melani;Decreased step height  Distance: 80 feet     Balance  Sitting - Static: Good  Sitting - Dynamic: Good  Standing - Static: Good;-  Standing - Dynamic: Good;-        Plan   Plan  Times per week: 1-2x/d, 6-7 days a week  Current Treatment Recommendations: Balance Training, Functional Mobility Training, Transfer Training, Endurance Training, Gait Training, Stair training, Patient/Caregiver Education & Training, Safety Education & Training, Home Exercise Program, Strengthening  Safety Devices  Type of devices:  All fall risk precautions in place, Call light within reach, Gait belt, Nurse notified, Left in chair(Nursing present at end of treatment.)      OutComes Score    AM-PAC Score  AM-PAC Inpatient Mobility Raw Score : 17 (05/29/20 1645)  AM-PAC Inpatient T-Scale Score : 42.13 (05/29/20 1645)  Mobility Inpatient CMS 0-100% Score: 50.57 (05/29/20 1645)  Mobility Inpatient CMS G-Code Modifier : CK (05/29/20 1645)          Goals  Short term goals  Time Frame for Short term goals: 12 visits  Short term goal 1: Patient will be indep with bed mobility and transfers. Short term goal 2: Patient will amb 300 feet indep. Short term goal 3: Patient will negotiate 5 steps with rails indep. Short term goal 4: Patient will tolerate 30 minutes of ther-ex and ther-act.   Patient Goals   Patient goals : pain control       Therapy Time   Individual Concurrent Group Co-treatment   Time In 1521         Time Out 1551         Minutes 30         Timed Code Treatment Minutes: [address], PT

## 2020-05-29 NOTE — CARE COORDINATION
Case Management Initial Discharge Plan  Kelsi Daley,         Readmission Risk              Risk of Unplanned Readmission:        7             Met with:patient to discuss discharge plans. Information verified: address, contacts, phone number, , insurance Yes  PCP: Fatoumata Bryson MD  Date of last visit: last week     Insurance Provider: Erica Dobbs 150     Discharge Planning  Current Residence:  Private home   Living Arrangements:  Spouse/Significant Other       Home has 1 stories/2 stairs to climb to enter the home   Support Systems:  Spouse/Significant Other, Friends/Neighbors       Current Services PTA:  None    Agency: none        Patient able to perform ADL's:Independent  DME in home:  None   DME used to aid ambulation prior to admission:   None   DME used during admission: none      Potential Assistance Needed:  N/A    Pharmacy: [facility] in [address] [place] [address] Medications:  No  Does patient want to participate in local refill/ meds to beds program?  No    Patient agreeable to home care: if needed   Freedom of choice provided:  n/a      Type of Home Care Services:  None  Patient expects to be discharged to:  Home    Prior SNF/Rehab Placement and Facility: none   Agreeable to SNF/Rehab: No  Banner of choice provided: n/a   Evaluation: n/a    Expected Discharge date:  20  Follow Up Appointment: Best Day/ Time: Monday AM    Transportation provider: per family  Transportation arrangements needed for discharge: No    Discharge Plan:   Patient lives at home with wife and very young 69year old. Patient very independent and active . Patient has no DME and admitted s/p colectomy. He is up and ambulating in room to bathroom, NG is out.      No anticipated needs but will follow    Electronically signed by Robert Carpio RN on 20 at 1:24 PM EDT

## 2020-05-29 NOTE — PLAN OF CARE
Problem: Skin Integrity:  Goal: Risk for impaired skin integrity will decrease  Description: Risk for impaired skin integrity will decrease  Outcome: Ongoing     Problem: Pain:  Goal: Pain level will decrease  Description: Pain level will decrease  Outcome: Ongoing  Goal: Control of acute pain  Description: Control of acute pain  Outcome: Ongoing

## 2020-05-29 NOTE — FLOWSHEET NOTE
05/29/20 1214   Output (mL)   Urine 200 mL   Urine Assessment   Bladder Scan Volume (mL) 0 mL   $ Bladder scan $ Yes

## 2020-05-29 NOTE — OP NOTE
actually in  the distal transverse colon almost at the splenic flexure area that was  also tattooed. The dissection started at the cecum. The appendix, the cecum, and the  ascending colon were all dissected off the abdominal wall and out of the  white line of Toldt. Then, the hepatic flexure was also dissected off. Then the transverse colon had to be  from the omentum. I was  able to get into the lesser sac and then the omentum was divided using  the LigaSure. The entire omentum, which was covering the transverse  colon was completely divided. At this time, I moved to the left side of the colon. The sigmoid area  and the descending colon were also dissected off the white line of  Toldt. Then, the splenic flexure was dissected off. The splenic  flexure was very high. It took at least additional hour or so until I  was able to mobilize the splenic flexure. All the omentum and all the  adhesions were divided using both the Bovie and the LigaSure. Finally,  the entire transverse colon and the splenic flexure and the upper  descending colon were all mobilized off completely. Also, the right  colon and the hepatic flexure were also completely mobilized. At this time, it was decided to do a complete transverse colon excision  and the upper part of the ascending colon. The upper part of the  ascending colon where the first polyp is there and is tattooed. The  mesentery was opened with LigaSure. Then, the colon was divided using  the 75-WAI stapler. Then, the mesentery continued to be divided with  the LigaSure until we reach the upper part of the descending colon. The  specimen was delivered en bloc. The distal part of the colon was also  divided using the WAI stapler. During the dissection, there was a small heat injury on small area of  the small bowel, which was about 2 mm. This was immediately sewn using  a 4-0 silk suture in a figure-of-eight fashion.     The anastomosis was done now using an end-to-end _____parietal  anastomosis. Both ends of the ascending colon and the descending colon  were put together. A 4-0 silk suture was placed at about 6 cm on the  tenia of both the ascending and the transverse colon. Then, the WAI  stapler was used. Both corners of the ascending and the descending  colon were opened up. The WAI was placed through those openings on the  tenia wall avoiding any mesentery. Then, the stapler was fired. Then,  the enterotomy was closed using a TA stapler. All the edges of the  opening were clamped with Allis clamps. Then, the TA-60 stapler was  fired. Then, the tissue on the top of the firing staples were  completely excised. All the staple line at the anastomosis were all reinforced using a 4-0  silk pop-off suture in an interrupted fashion. Then, the examination  showed that there was no pressure at both ends. The blood supply was  excellent. The abdominal cavity was irrigated now with 2 L of warm  saline. All areas of surgery were examined for hemostasis and there was  no bleeding at all. At this time, all instruments and all the drapes, and gauze and gloves  were changed. Then, the abdominal cavity was examined again. There was  no evidence of any bleeding. Two more liters were used to irrigate the  abdominal cavity. Then, the incision was closed using two loops of PDS. Both loops were run from each end and they were met in the middle and  they were tied together. The subcutaneous tissue was irrigated with  warm saline again. Then, the skin was closed using the stapler. The procedure was terminated without any complications. EBL was 75 mL. IV solution was 2000 mL. The patient again tolerated the procedure very  well and was transferred to the recovery room in a good condition.         WILLOW Vargas    D: 05/28/2020 15:37:49       T: 05/28/2020 18:27:57     [email or web address]  Job#: [id number]     Doc#: [id number]    CC:

## 2020-05-30 LAB
ANION GAP SERPL CALCULATED.3IONS-SCNC: 14 MMOL/L (ref 9–17)
ANTI-XA UNFRAC HEPARIN: <0.1 IU/L (ref 0.3–0.7)
BUN BLDV-MCNC: 11 MG/DL (ref 8–23)
BUN/CREAT BLD: 12 (ref 9–20)
CALCIUM SERPL-MCNC: 8.7 MG/DL (ref 8.6–10.4)
CHLORIDE BLD-SCNC: 98 MMOL/L (ref 98–107)
CO2: 22 MMOL/L (ref 20–31)
CREAT SERPL-MCNC: 0.89 MG/DL (ref 0.7–1.2)
EKG ATRIAL RATE: 141 BPM
EKG Q-T INTERVAL: 294 MS
EKG QRS DURATION: 82 MS
EKG QTC CALCULATION (BAZETT): 463 MS
EKG R AXIS: -25 DEGREES
EKG T AXIS: 96 DEGREES
EKG VENTRICULAR RATE: 149 BPM
GFR AFRICAN AMERICAN: >60 ML/MIN
GFR NON-AFRICAN AMERICAN: >60 ML/MIN
GFR SERPL CREATININE-BSD FRML MDRD: ABNORMAL ML/MIN/{1.73_M2}
GFR SERPL CREATININE-BSD FRML MDRD: ABNORMAL ML/MIN/{1.73_M2}
GLUCOSE BLD-MCNC: 111 MG/DL (ref 70–99)
HCT VFR BLD CALC: 47 % (ref 40.7–50.3)
HCT VFR BLD CALC: 47.7 % (ref 40.7–50.3)
HEMOGLOBIN: 15.5 G/DL (ref 13–17)
HEMOGLOBIN: 15.6 G/DL (ref 13–17)
INR BLD: 1.3
LV EF: 60 %
LVEF MODALITY: NORMAL
MCH RBC QN AUTO: 29.4 PG (ref 25.2–33.5)
MCH RBC QN AUTO: 29.5 PG (ref 25.2–33.5)
MCHC RBC AUTO-ENTMCNC: 32.7 G/DL (ref 28.4–34.8)
MCHC RBC AUTO-ENTMCNC: 33 G/DL (ref 28.4–34.8)
MCV RBC AUTO: 89.5 FL (ref 82.6–102.9)
MCV RBC AUTO: 89.8 FL (ref 82.6–102.9)
NRBC AUTOMATED: 0 PER 100 WBC
NRBC AUTOMATED: 0 PER 100 WBC
PARTIAL THROMBOPLASTIN TIME: 31 SEC (ref 23–31)
PDW BLD-RTO: 13.1 % (ref 11.8–14.4)
PDW BLD-RTO: 13.2 % (ref 11.8–14.4)
PLATELET # BLD: 182 K/UL (ref 138–453)
PLATELET # BLD: 188 K/UL (ref 138–453)
PMV BLD AUTO: 10.7 FL (ref 8.1–13.5)
PMV BLD AUTO: 11.2 FL (ref 8.1–13.5)
POTASSIUM SERPL-SCNC: 4 MMOL/L (ref 3.7–5.3)
PROTHROMBIN TIME: 15.5 SEC (ref 11.5–14.2)
RBC # BLD: 5.25 M/UL (ref 4.21–5.77)
RBC # BLD: 5.31 M/UL (ref 4.21–5.77)
SODIUM BLD-SCNC: 134 MMOL/L (ref 135–144)
WBC # BLD: 18.6 K/UL (ref 3.5–11.3)
WBC # BLD: 19.1 K/UL (ref 3.5–11.3)

## 2020-05-30 PROCEDURE — 93005 ELECTROCARDIOGRAM TRACING: CPT | Performed by: INTERNAL MEDICINE

## 2020-05-30 PROCEDURE — 93306 TTE W/DOPPLER COMPLETE: CPT

## 2020-05-30 PROCEDURE — 97116 GAIT TRAINING THERAPY: CPT

## 2020-05-30 PROCEDURE — 6360000002 HC RX W HCPCS: Performed by: COLON & RECTAL SURGERY

## 2020-05-30 PROCEDURE — 93010 ELECTROCARDIOGRAM REPORT: CPT | Performed by: INTERNAL MEDICINE

## 2020-05-30 PROCEDURE — 2580000003 HC RX 258: Performed by: INTERNAL MEDICINE

## 2020-05-30 PROCEDURE — 6360000002 HC RX W HCPCS: Performed by: INTERNAL MEDICINE

## 2020-05-30 PROCEDURE — 36415 COLL VENOUS BLD VENIPUNCTURE: CPT

## 2020-05-30 PROCEDURE — 2060000000 HC ICU INTERMEDIATE R&B

## 2020-05-30 PROCEDURE — 2500000003 HC RX 250 WO HCPCS: Performed by: COLON & RECTAL SURGERY

## 2020-05-30 PROCEDURE — 85610 PROTHROMBIN TIME: CPT

## 2020-05-30 PROCEDURE — 85520 HEPARIN ASSAY: CPT

## 2020-05-30 PROCEDURE — 85730 THROMBOPLASTIN TIME PARTIAL: CPT

## 2020-05-30 PROCEDURE — 6370000000 HC RX 637 (ALT 250 FOR IP): Performed by: COLON & RECTAL SURGERY

## 2020-05-30 PROCEDURE — 97110 THERAPEUTIC EXERCISES: CPT

## 2020-05-30 PROCEDURE — 85027 COMPLETE CBC AUTOMATED: CPT

## 2020-05-30 PROCEDURE — 80048 BASIC METABOLIC PNL TOTAL CA: CPT

## 2020-05-30 PROCEDURE — 2500000003 HC RX 250 WO HCPCS: Performed by: INTERNAL MEDICINE

## 2020-05-30 RX ORDER — HEPARIN SODIUM 5000 [USP'U]/ML
2000 INJECTION, SOLUTION INTRAVENOUS; SUBCUTANEOUS PRN
Status: DISCONTINUED | OUTPATIENT
Start: 2020-05-30 | End: 2020-05-30 | Stop reason: ALTCHOICE

## 2020-05-30 RX ORDER — HEPARIN SODIUM 5000 [USP'U]/ML
4000 INJECTION, SOLUTION INTRAVENOUS; SUBCUTANEOUS ONCE
Status: DISCONTINUED | OUTPATIENT
Start: 2020-05-30 | End: 2020-05-30

## 2020-05-30 RX ORDER — AMIODARONE HYDROCHLORIDE 50 MG/ML
150 INJECTION, SOLUTION INTRAVENOUS ONCE
Status: DISCONTINUED | OUTPATIENT
Start: 2020-05-30 | End: 2020-05-30 | Stop reason: CLARIF

## 2020-05-30 RX ORDER — CIPROFLOXACIN 2 MG/ML
400 INJECTION, SOLUTION INTRAVENOUS EVERY 12 HOURS
Status: COMPLETED | OUTPATIENT
Start: 2020-05-30 | End: 2020-06-03

## 2020-05-30 RX ORDER — DILTIAZEM HYDROCHLORIDE 5 MG/ML
5 INJECTION INTRAVENOUS ONCE
Status: COMPLETED | OUTPATIENT
Start: 2020-05-30 | End: 2020-05-30

## 2020-05-30 RX ORDER — HEPARIN SODIUM 10000 [USP'U]/100ML
11.67 INJECTION, SOLUTION INTRAVENOUS CONTINUOUS
Status: DISCONTINUED | OUTPATIENT
Start: 2020-05-30 | End: 2020-05-30

## 2020-05-30 RX ORDER — HEPARIN SODIUM 5000 [USP'U]/ML
4000 INJECTION, SOLUTION INTRAVENOUS; SUBCUTANEOUS PRN
Status: DISCONTINUED | OUTPATIENT
Start: 2020-05-30 | End: 2020-05-30 | Stop reason: ALTCHOICE

## 2020-05-30 RX ORDER — DIGOXIN 0.25 MG/ML
250 INJECTION INTRAMUSCULAR; INTRAVENOUS ONCE
Status: COMPLETED | OUTPATIENT
Start: 2020-05-30 | End: 2020-05-30

## 2020-05-30 RX ADMIN — AMIODARONE HYDROCHLORIDE 1 MG/MIN: 50 INJECTION, SOLUTION INTRAVENOUS at 11:33

## 2020-05-30 RX ADMIN — ENOXAPARIN SODIUM 40 MG: 40 INJECTION SUBCUTANEOUS at 20:21

## 2020-05-30 RX ADMIN — AMIODARONE HYDROCHLORIDE 0.5 MG/MIN: 50 INJECTION, SOLUTION INTRAVENOUS at 21:48

## 2020-05-30 RX ADMIN — HYDROMORPHONE HYDROCHLORIDE 0.5 MG: 1 INJECTION, SOLUTION INTRAMUSCULAR; INTRAVENOUS; SUBCUTANEOUS at 00:21

## 2020-05-30 RX ADMIN — METRONIDAZOLE 500 MG: 500 INJECTION, SOLUTION INTRAVENOUS at 19:07

## 2020-05-30 RX ADMIN — CIPROFLOXACIN 400 MG: 2 INJECTION, SOLUTION INTRAVENOUS at 11:24

## 2020-05-30 RX ADMIN — DILTIAZEM HYDROCHLORIDE 10 MG/HR: 5 INJECTION INTRAVENOUS at 04:35

## 2020-05-30 RX ADMIN — METRONIDAZOLE 500 MG: 500 INJECTION, SOLUTION INTRAVENOUS at 13:47

## 2020-05-30 RX ADMIN — AMIODARONE HYDROCHLORIDE 150 MG: 50 INJECTION, SOLUTION INTRAVENOUS at 11:23

## 2020-05-30 RX ADMIN — HYDROMORPHONE HYDROCHLORIDE 0.5 MG: 1 INJECTION, SOLUTION INTRAMUSCULAR; INTRAVENOUS; SUBCUTANEOUS at 06:38

## 2020-05-30 RX ADMIN — HYDROMORPHONE HYDROCHLORIDE 0.5 MG: 1 INJECTION, SOLUTION INTRAMUSCULAR; INTRAVENOUS; SUBCUTANEOUS at 19:08

## 2020-05-30 RX ADMIN — HYDROMORPHONE HYDROCHLORIDE 0.5 MG: 1 INJECTION, SOLUTION INTRAMUSCULAR; INTRAVENOUS; SUBCUTANEOUS at 09:35

## 2020-05-30 RX ADMIN — CIPROFLOXACIN 400 MG: 2 INJECTION, SOLUTION INTRAVENOUS at 23:53

## 2020-05-30 RX ADMIN — DIGOXIN 250 MCG: 0.25 INJECTION INTRAMUSCULAR; INTRAVENOUS at 04:28

## 2020-05-30 RX ADMIN — DILTIAZEM HYDROCHLORIDE 5 MG: 5 INJECTION INTRAVENOUS at 04:33

## 2020-05-30 RX ADMIN — CLONAZEPAM 0.5 MG: 0.5 TABLET ORAL at 03:31

## 2020-05-30 RX ADMIN — HYDROMORPHONE HYDROCHLORIDE 0.5 MG: 1 INJECTION, SOLUTION INTRAMUSCULAR; INTRAVENOUS; SUBCUTANEOUS at 12:45

## 2020-05-30 RX ADMIN — HYDROMORPHONE HYDROCHLORIDE 0.5 MG: 1 INJECTION, SOLUTION INTRAMUSCULAR; INTRAVENOUS; SUBCUTANEOUS at 22:13

## 2020-05-30 RX ADMIN — HYDROMORPHONE HYDROCHLORIDE 0.5 MG: 1 INJECTION, SOLUTION INTRAMUSCULAR; INTRAVENOUS; SUBCUTANEOUS at 03:27

## 2020-05-30 ASSESSMENT — PAIN SCALES - GENERAL
PAINLEVEL_OUTOF10: 9
PAINLEVEL_OUTOF10: 8
PAINLEVEL_OUTOF10: 7
PAINLEVEL_OUTOF10: 7
PAINLEVEL_OUTOF10: 8
PAINLEVEL_OUTOF10: 7
PAINLEVEL_OUTOF10: 10
PAINLEVEL_OUTOF10: 8
PAINLEVEL_OUTOF10: 7
PAINLEVEL_OUTOF10: 8
PAINLEVEL_OUTOF10: 7
PAINLEVEL_OUTOF10: 8

## 2020-05-30 ASSESSMENT — PAIN DESCRIPTION - PAIN TYPE
TYPE: SURGICAL PAIN
TYPE: SURGICAL PAIN

## 2020-05-30 ASSESSMENT — PAIN - FUNCTIONAL ASSESSMENT: PAIN_FUNCTIONAL_ASSESSMENT: PREVENTS OR INTERFERES SOME ACTIVE ACTIVITIES AND ADLS

## 2020-05-30 ASSESSMENT — PAIN DESCRIPTION - LOCATION
LOCATION: ABDOMEN
LOCATION: ABDOMEN

## 2020-05-30 ASSESSMENT — PAIN DESCRIPTION - ONSET
ONSET: ON-GOING
ONSET: ON-GOING

## 2020-05-30 ASSESSMENT — PAIN DESCRIPTION - ORIENTATION
ORIENTATION: MID
ORIENTATION: MID

## 2020-05-30 ASSESSMENT — PAIN DESCRIPTION - DESCRIPTORS: DESCRIPTORS: CRAMPING;SORE;CONSTANT

## 2020-05-30 ASSESSMENT — PAIN DESCRIPTION - PROGRESSION: CLINICAL_PROGRESSION: NOT CHANGED

## 2020-05-30 ASSESSMENT — PAIN DESCRIPTION - FREQUENCY: FREQUENCY: CONTINUOUS

## 2020-05-30 NOTE — PLAN OF CARE
Problem: Bowel/Gastric:  Goal: Control of bowel function will improve  Description: Control of bowel function will improve  5/30/2020 0210 by Matilda Levy RN  Outcome: Ongoing    Problem: Bowel/Gastric:  Goal: Ability to achieve a regular elimination pattern will improve  Description: Ability to achieve a regular elimination pattern will improve  5/30/2020 0210 by Matilda Levy RN  Outcome: Ongoing     Problem: Nutritional:  Goal: Nutritional status will improve  Description: Nutritional status will improve  5/30/2020 0210 by Matilda Levy RN  Outcome: Ongoing     Problem: Skin Integrity:  Goal: Demonstration of wound healing without infection will improve  Description: Demonstration of wound healing without infection will improve  5/30/2020 0210 by Matilda Levy RN  Outcome: Ongoing     Problem: Skin Integrity:  Goal: Risk for impaired skin integrity will decrease  Description: Risk for impaired skin integrity will decrease  5/30/2020 0210 by Matilda Levy RN  Outcome: Ongoing     Problem: Skin Integrity:  Goal: Complications related to intravenous access or infusion will be avoided or minimized  Description: Complications related to intravenous access or infusion will be avoided or minimized  5/30/2020 0210 by Matilda Levy RN  Outcome: Ongoing     Problem: Pain:  Goal: Pain level will decrease  Description: Pain level will decrease  5/30/2020 0210 by Matilda Levy RN  Outcome: Ongoing     Problem: Physical Regulation:  Goal: Ability to maintain vital signs within normal range will improve  Description: Ability to maintain vital signs within normal range will improve  5/30/2020 0210 by Matilda Levy RN  Outcome: Ongoing     Problem: Falls - Risk of:  Goal: Will remain free from falls  Description: Will remain free from falls  Outcome: Ongoing   Will remain free from falls. Fall risk assessment completed. Patient instructed to use call light.  Bed locked and in lowest position, side rails up 2/4, call light and bedside table within reach, clutter removed, and non-skid footwear on when pt out of bed. Hourly rounds will continue.

## 2020-05-30 NOTE — PROGRESS NOTES
Colorectal Surgery Progress Note            PATIENT NAME: Idalia Gonzalez     TODAY'S DATE: 5/30/2020, 9:13 AM    SUBJECTIVE:    Pt    Had A. Fib around 4 am. Cardiology saw him. Eco to be done soon , Pain under control. No flatus . No nausea or vomiting. OBJECTIVE:   VITALS:  /76   Pulse 106   Temp 98.1 °F (36.7 °C) (Oral)   Resp 16   Ht 5' 10\" (1.778 m)   Wt 189 lb (85.7 kg)   SpO2 95%   BMI 27.12 kg/m²      INTAKE/OUTPUT:      Intake/Output Summary (Last 24 hours) at 5/30/2020 0913  Last data filed at 5/30/2020 0517  Gross per 24 hour   Intake 545 ml   Output 1010 ml   Net -465 ml       PHYSICAL EXAM  General appearance - alert, well appearing, and in no acute distress  Mental status - oriented to person, place, and time with normal affect  Head - normocephalic and atraumatic  Eyes - pupils equal and reactive, extraocular eye movements intact, conjunctiva clear  Ears - hearing appears to be intact  Nose - no drainage noted  Mouth - mucous membranes moist  Neck - supple, no carotid bruits, thyroid not palpable  Chest - clear to auscultation, normal effort  Heart - normal rate, regular rhythm, no murmurs  Abdomen - soft, nontender, nondistended, bowel sounds present all four quadrants, no masses, hepatomegaly or splenomegaly.  No hernias      Data:  CBC:   Lab Results   Component Value Date    WBC 19.1 05/30/2020    RBC 5.31 05/30/2020    HGB 15.6 05/30/2020    HCT 47.7 05/30/2020    MCV 89.8 05/30/2020    MCH 29.4 05/30/2020    MCHC 32.7 05/30/2020    RDW 13.2 05/30/2020     05/30/2020    MPV 10.7 05/30/2020     CBC with Differential:    Lab Results   Component Value Date    WBC 19.1 05/30/2020    RBC 5.31 05/30/2020    HGB 15.6 05/30/2020    HCT 47.7 05/30/2020     05/30/2020    MCV 89.8 05/30/2020    MCH 29.4 05/30/2020    MCHC 32.7 05/30/2020    RDW 13.2 05/30/2020     Hemoglobin/Hematocrit:    Lab Results   Component Value Date    HGB 15.6 05/30/2020    HCT 47.7 05/30/2020     CMP:

## 2020-05-30 NOTE — PLAN OF CARE
Problem: Bowel/Gastric:  Goal: Ability to achieve a regular elimination pattern will improve  Description: Ability to achieve a regular elimination pattern will improve. 5/30/2020 0813 by Alvarez Worley RN  Outcome: Ongoing     Problem: Nutritional:  Goal: Nutritional status will improve  Description: Nutritional status will improve. Pt remains NPO at this time. 5/30/2020 0813 by Alvarez Worley RN  Outcome: Ongoing     Problem: Skin Integrity:  Goal: Risk for impaired skin integrity will decrease  Description: Risk for impaired skin integrity will decrease. Skin assessment completed. Patient repositioned every 2 hours and PRN, risk for pressure ulcer assessed as well as bony prominences. Patient instructed to turn self when applicable. Will continue to monitor. 5/30/2020 0813 by Alvarez Worley RN  Outcome: Ongoing     Problem: Pain:  Goal: Control of acute pain  Description: Control of acute pain. Pain assessment completed. Patient demonstrates understanding of pain rating scale and interventions. At this time patient states pain is well controlled with current interventions. Will continue to monitor and reassess with each rounding and PRN. 5/30/2020 0813 by Alvarez Worley RN  Outcome: Ongoing     Problem: Falls - Risk of:  Goal: Will remain free from falls  Description: Will remain free from falls. Fall risk assessment completed. Patient instructed to use call light. Bed locked and in lowest position, side rails up 2/4, call light and bedside table within reach, clutter removed, and non-skid footwear on when pt out of bed. Hourly rounds will continue.     5/30/2020 0813 by Alvarez Worley RN  Outcome: Ongoing

## 2020-05-30 NOTE — PROGRESS NOTES
Staff unable to obtain second IV access to start heparin gtt as current IV has amio gtt infusing. Pt requires ultrasound guided IV access. No staff available to start ultrasound guided IV at this time.

## 2020-05-30 NOTE — PROGRESS NOTES
Per Dr Yael Hassan, no need to start patient on heparin gtt. Continue amiodarone gtt for now and restart lovenox daily. See orders.

## 2020-05-30 NOTE — PROGRESS NOTES
assistance  Sit to Supine: Moderate assistance;Maximum assistance  Scooting: Maximal assistance  Transfers  Sit to Stand: Stand by assistance  Stand to sit: Stand by assistance  Bed to Chair: Stand by assistance  Stand Pivot Transfers: Stand by assistance  Comment: Pt was able to sit on the edge of the bed x 10 minutes with slight increases in abdominal pain being reported. Ambulation  Surface: level tile  Device: No Device  Assistance: Stand by assistance  Gait Deviations: Slow Melani;Decreased step height  Distance: 60 ft    Goals  Short term goals  Time Frame for Short term goals: 12 visits  Short term goal 1: Patient will be indep with bed mobility and transfers. Short term goal 2: Patient will amb 300 feet indep. Short term goal 3: Patient will negotiate 5 steps with rails indep. Short term goal 4: Patient will tolerate 30 minutes of ther-ex and ther-act. Patient Goals   Patient goals : pain control    Plan  Times per week: 1-2x/d, 6-7 days a week  Current Treatment Recommendations: Balance Training, Functional Mobility Training, Transfer Training, Endurance Training, Gait Training, Stair training, Patient/Caregiver Education & Training, Safety Education & Training, Home Exercise Program, Strengthening  Safety Devices  Type of devices:  All fall risk precautions in place, Call light within reach, Gait belt, Nurse notified, Left in chair, Bed alarm in place    Therapy Time   Individual Concurrent Group Co-treatment   Time In 1005         Time Out 1035         Minutes [address] [facility], PT DPT

## 2020-05-31 LAB
ANION GAP SERPL CALCULATED.3IONS-SCNC: 12 MMOL/L (ref 9–17)
BUN BLDV-MCNC: 11 MG/DL (ref 8–23)
BUN/CREAT BLD: 15 (ref 9–20)
CALCIUM SERPL-MCNC: 8.6 MG/DL (ref 8.6–10.4)
CHLORIDE BLD-SCNC: 98 MMOL/L (ref 98–107)
CO2: 24 MMOL/L (ref 20–31)
CREAT SERPL-MCNC: 0.71 MG/DL (ref 0.7–1.2)
GFR AFRICAN AMERICAN: >60 ML/MIN
GFR NON-AFRICAN AMERICAN: >60 ML/MIN
GFR SERPL CREATININE-BSD FRML MDRD: ABNORMAL ML/MIN/{1.73_M2}
GFR SERPL CREATININE-BSD FRML MDRD: ABNORMAL ML/MIN/{1.73_M2}
GLUCOSE BLD-MCNC: 112 MG/DL (ref 70–99)
HCT VFR BLD CALC: 43.3 % (ref 40.7–50.3)
HEMOGLOBIN: 14.1 G/DL (ref 13–17)
MCH RBC QN AUTO: 29.4 PG (ref 25.2–33.5)
MCHC RBC AUTO-ENTMCNC: 32.6 G/DL (ref 28.4–34.8)
MCV RBC AUTO: 90.2 FL (ref 82.6–102.9)
NRBC AUTOMATED: 0 PER 100 WBC
PDW BLD-RTO: 13 % (ref 11.8–14.4)
PLATELET # BLD: 188 K/UL (ref 138–453)
PMV BLD AUTO: 11.2 FL (ref 8.1–13.5)
POTASSIUM SERPL-SCNC: 3.9 MMOL/L (ref 3.7–5.3)
RBC # BLD: 4.8 M/UL (ref 4.21–5.77)
SODIUM BLD-SCNC: 134 MMOL/L (ref 135–144)
WBC # BLD: 16.1 K/UL (ref 3.5–11.3)

## 2020-05-31 PROCEDURE — 2580000003 HC RX 258: Performed by: COLON & RECTAL SURGERY

## 2020-05-31 PROCEDURE — 36415 COLL VENOUS BLD VENIPUNCTURE: CPT

## 2020-05-31 PROCEDURE — 2580000003 HC RX 258: Performed by: ANESTHESIOLOGY

## 2020-05-31 PROCEDURE — 97530 THERAPEUTIC ACTIVITIES: CPT | Performed by: NURSE PRACTITIONER

## 2020-05-31 PROCEDURE — 6360000002 HC RX W HCPCS: Performed by: COLON & RECTAL SURGERY

## 2020-05-31 PROCEDURE — 80048 BASIC METABOLIC PNL TOTAL CA: CPT

## 2020-05-31 PROCEDURE — 6370000000 HC RX 637 (ALT 250 FOR IP): Performed by: INTERNAL MEDICINE

## 2020-05-31 PROCEDURE — 2500000003 HC RX 250 WO HCPCS: Performed by: COLON & RECTAL SURGERY

## 2020-05-31 PROCEDURE — 2060000000 HC ICU INTERMEDIATE R&B

## 2020-05-31 PROCEDURE — 6370000000 HC RX 637 (ALT 250 FOR IP): Performed by: COLON & RECTAL SURGERY

## 2020-05-31 PROCEDURE — 85027 COMPLETE CBC AUTOMATED: CPT

## 2020-05-31 PROCEDURE — 6360000002 HC RX W HCPCS: Performed by: INTERNAL MEDICINE

## 2020-05-31 RX ORDER — AMIODARONE HYDROCHLORIDE 200 MG/1
200 TABLET ORAL DAILY
Status: DISCONTINUED | OUTPATIENT
Start: 2020-05-31 | End: 2020-06-03 | Stop reason: HOSPADM

## 2020-05-31 RX ADMIN — ENOXAPARIN SODIUM 40 MG: 40 INJECTION SUBCUTANEOUS at 08:16

## 2020-05-31 RX ADMIN — CIPROFLOXACIN 400 MG: 2 INJECTION, SOLUTION INTRAVENOUS at 23:30

## 2020-05-31 RX ADMIN — AMIODARONE HYDROCHLORIDE 200 MG: 200 TABLET ORAL at 08:15

## 2020-05-31 RX ADMIN — HYDROMORPHONE HYDROCHLORIDE 0.5 MG: 1 INJECTION, SOLUTION INTRAMUSCULAR; INTRAVENOUS; SUBCUTANEOUS at 18:21

## 2020-05-31 RX ADMIN — METRONIDAZOLE 500 MG: 500 INJECTION, SOLUTION INTRAVENOUS at 20:02

## 2020-05-31 RX ADMIN — METRONIDAZOLE 500 MG: 500 INJECTION, SOLUTION INTRAVENOUS at 11:47

## 2020-05-31 RX ADMIN — HYDROMORPHONE HYDROCHLORIDE 0.5 MG: 1 INJECTION, SOLUTION INTRAMUSCULAR; INTRAVENOUS; SUBCUTANEOUS at 20:02

## 2020-05-31 RX ADMIN — HYDROMORPHONE HYDROCHLORIDE 0.5 MG: 1 INJECTION, SOLUTION INTRAMUSCULAR; INTRAVENOUS; SUBCUTANEOUS at 08:16

## 2020-05-31 RX ADMIN — CLONAZEPAM 0.5 MG: 0.5 TABLET ORAL at 03:44

## 2020-05-31 RX ADMIN — HYDROMORPHONE HYDROCHLORIDE 0.5 MG: 1 INJECTION, SOLUTION INTRAMUSCULAR; INTRAVENOUS; SUBCUTANEOUS at 15:31

## 2020-05-31 RX ADMIN — CLONAZEPAM 0.5 MG: 0.5 TABLET ORAL at 19:28

## 2020-05-31 RX ADMIN — CIPROFLOXACIN 400 MG: 2 INJECTION, SOLUTION INTRAVENOUS at 11:42

## 2020-05-31 RX ADMIN — METRONIDAZOLE 500 MG: 500 INJECTION, SOLUTION INTRAVENOUS at 03:39

## 2020-05-31 RX ADMIN — HYDROMORPHONE HYDROCHLORIDE 0.5 MG: 1 INJECTION, SOLUTION INTRAMUSCULAR; INTRAVENOUS; SUBCUTANEOUS at 11:42

## 2020-05-31 RX ADMIN — Medication 10 ML: at 08:16

## 2020-05-31 RX ADMIN — HYDROMORPHONE HYDROCHLORIDE 0.5 MG: 1 INJECTION, SOLUTION INTRAMUSCULAR; INTRAVENOUS; SUBCUTANEOUS at 04:52

## 2020-05-31 RX ADMIN — HYDROMORPHONE HYDROCHLORIDE 0.5 MG: 1 INJECTION, SOLUTION INTRAMUSCULAR; INTRAVENOUS; SUBCUTANEOUS at 21:28

## 2020-05-31 RX ADMIN — SODIUM CHLORIDE, POTASSIUM CHLORIDE, SODIUM LACTATE AND CALCIUM CHLORIDE: 600; 310; 30; 20 INJECTION, SOLUTION INTRAVENOUS at 15:32

## 2020-05-31 RX ADMIN — HYDROMORPHONE HYDROCHLORIDE 0.5 MG: 1 INJECTION, SOLUTION INTRAMUSCULAR; INTRAVENOUS; SUBCUTANEOUS at 01:32

## 2020-05-31 ASSESSMENT — PAIN SCALES - GENERAL
PAINLEVEL_OUTOF10: 8
PAINLEVEL_OUTOF10: 10
PAINLEVEL_OUTOF10: 0
PAINLEVEL_OUTOF10: 8
PAINLEVEL_OUTOF10: 8
PAINLEVEL_OUTOF10: 10
PAINLEVEL_OUTOF10: 10
PAINLEVEL_OUTOF10: 8
PAINLEVEL_OUTOF10: 10

## 2020-05-31 NOTE — PROGRESS NOTES
Pt continues to c/o intermittent pain. Patient encouraged to change position, get up to chair, wash up and increase activity. Pt instucted to continue to use incentive spirometry 10x/hr. Pt verbalizes understanding. Pt declines getting out of bed or washed up at this time. PRN dilaudid given. Will continue to monitor.

## 2020-05-31 NOTE — PROGRESS NOTES
Colorectal Surgery Progress Note            PATIENT NAME: Janis Merrill     TODAY'S DATE: 5/31/2020, 9:47 AM    SUBJECTIVE:    Pt : pain is under control. No BM or flatus yet     OBJECTIVE:   VITALS:  /68   Pulse 76   Temp 97.9 °F (36.6 °C) (Oral)   Resp 18   Ht 5' 10\" (1.778 m)   Wt 189 lb (85.7 kg)   SpO2 95%   BMI 27.12 kg/m²      INTAKE/OUTPUT:      Intake/Output Summary (Last 24 hours) at 5/31/2020 0947  Last data filed at 5/31/2020 0840  Gross per 24 hour   Intake 2075.3 ml   Output 950 ml   Net 1125.3 ml       PHYSICAL EXAM  General appearance - alert, well appearing, and in no acute distress  Mental status - oriented to person, place, and time with normal affect  Head - normocephalic and atraumatic  Eyes - pupils equal and reactive, extraocular eye movements intact, conjunctiva clear  Ears - hearing appears to be intact  Nose - no drainage noted  Mouth - mucous membranes moist  Neck - supple, no carotid bruits, thyroid not palpable  Chest - clear to auscultation, normal effort  Heart - normal rate, regular rhythm, no murmurs  Abdomen - soft, nontender, nondistended, bowel sounds present all four quadrants, no masses, hepatomegaly or splenomegaly.  No hernias      Data:  CBC:   Lab Results   Component Value Date    WBC 16.1 05/31/2020    RBC 4.80 05/31/2020    HGB 14.1 05/31/2020    HCT 43.3 05/31/2020    MCV 90.2 05/31/2020    MCH 29.4 05/31/2020    MCHC 32.6 05/31/2020    RDW 13.0 05/31/2020     05/31/2020    MPV 11.2 05/31/2020     CBC with Differential:    Lab Results   Component Value Date    WBC 16.1 05/31/2020    RBC 4.80 05/31/2020    HGB 14.1 05/31/2020    HCT 43.3 05/31/2020     05/31/2020    MCV 90.2 05/31/2020    MCH 29.4 05/31/2020    MCHC 32.6 05/31/2020    RDW 13.0 05/31/2020     Hemoglobin/Hematocrit:    Lab Results   Component Value Date    HGB 14.1 05/31/2020    HCT 43.3 05/31/2020     CMP:    Lab Results   Component Value Date     05/31/2020    K 3.9 05/31/2020    CL 98 05/31/2020    CO2 24 05/31/2020    BUN 11 05/31/2020    CREATININE 0.71 05/31/2020    GFRAA >60 05/31/2020    LABGLOM >60 05/31/2020    GLUCOSE 112 05/31/2020    CALCIUM 8.6 05/31/2020     BMP:    Lab Results   Component Value Date     05/31/2020    K 3.9 05/31/2020    CL 98 05/31/2020    CO2 24 05/31/2020    BUN 11 05/31/2020    CREATININE 0.71 05/31/2020    CALCIUM 8.6 05/31/2020    GFRAA >60 05/31/2020    LABGLOM >60 05/31/2020    GLUCOSE 112 05/31/2020     PT/INR:    Lab Results   Component Value Date    PROTIME 15.5 05/30/2020    INR 1.3 05/30/2020     PTT:    Lab Results   Component Value Date    APTT 31.0 05/30/2020   [APTT}    Radiology Review:  Echocardiogram 2D W M-Mode  [facility]    Transthoracic Echocardiography Report (TTE)     Patient Name CARVER      Date of Study               05/30/2020                [facility]      Date of      1950  Gender                      Male   Birth      Age          69 year(s)  Race                              Room Number  1007        Height:                     70 inch, 177.8 cm      Corporate ID [id number]    Weight:                     189 pounds, 85.7 kg   #      Patient Acct [de-identified]   BSA:          2.04 m^2      BMI:      27.12   #                                                              kg/m^2      MR #         [id number]     Sonographer                 CynthiaJenifer      Accession #  [id number]   Interpreting Physician      Austen [place] 61      Fellow                   Referring Nurse                            Practitioner      Interpreting             Referring Physician         Samia Alcantara   Fellow     Type of Study      TTE procedure:2D Echocardiogram, M-Mode, Doppler, Color Doppler. Procedure Date  Date: 05/30/2020 Start: 08:23 AM    Study Location: [address]  Technical Quality: Poor visualization due to patient immobility. Indications:Atrial fibrillation. History / Tech.

## 2020-05-31 NOTE — PROGRESS NOTES
[facility]   Progress Note                   Date:   5/31/2020  Patient name: Linda Fair  Date of admission:  5/28/2020  9:55 AM  MRN:   [id number]  YOB: 1950  PCP: Dana Miranda MD    Reason for Admission: colon resection. Subjective:       Clinical Changes / Abnormalities: Feels good, no chest pain or SOB. Medications:   Scheduled Meds:   metroNIDAZOLE  500 mg Intravenous Q8H    ciprofloxacin  400 mg Intravenous Q12H    enoxaparin  40 mg Subcutaneous Daily    nicotine  1 patch Transdermal Daily    sodium chloride flush  10 mL Intravenous 2 times per day     Continuous Infusions:   dilTIAZem (CARDIZEM) 125 mg in dextrose 5% 125 mL infusion Stopped (05/30/20 1132)    amiodarone 450mg/250ml D5W infusion 0.5 mg/min (05/30/20 2148)    lactated ringers 50 mL/hr at 05/29/20 0027    lactated ringers 125 mL/hr at 05/28/20 2334     CBC:   Recent Labs     05/30/20  0512 05/30/20  0821 05/31/20  0555   WBC 18.6* 19.1* 16.1*   HGB 15.5 15.6 14.1    188 188     BMP:    Recent Labs     05/29/20  0544 05/30/20  0512 05/31/20  0555    134* 134*   K 4.9 4.0 3.9    98 98   CO2 21 22 24   BUN 13 11 11   CREATININE 0.93 0.89 0.71   GLUCOSE 116* 111* 112*     Hepatic: No results for input(s): AST, ALT, ALB, BILITOT, ALKPHOS in the last 72 hours. Troponin: No results for input(s): TROPONINI in the last 72 hours. BNP: No results for input(s): BNP in the last 72 hours. Lipids: No results for input(s): CHOL, HDL in the last 72 hours. Invalid input(s): LDLCALCU  INR:   Recent Labs     05/30/20  0821   INR 1.3       Objective:   Vitals: /68   Pulse 78   Temp 98.2 °F (36.8 °C) (Oral)   Resp 18   Ht 5' 10\" (1.778 m)   Wt 189 lb (85.7 kg)   SpO2 94%   BMI 27.12 kg/m²   General appearance: alert and cooperative with exam  HEENT: Head: Normocephalic, no lesions, without obvious abnormality.   Neck: no adenopathy, no carotid bruit, no JVD, supple,

## 2020-06-01 LAB
ANION GAP SERPL CALCULATED.3IONS-SCNC: 12 MMOL/L (ref 9–17)
BUN BLDV-MCNC: 12 MG/DL (ref 8–23)
BUN/CREAT BLD: 17 (ref 9–20)
CALCIUM SERPL-MCNC: 8.6 MG/DL (ref 8.6–10.4)
CHLORIDE BLD-SCNC: 97 MMOL/L (ref 98–107)
CO2: 25 MMOL/L (ref 20–31)
CREAT SERPL-MCNC: 0.72 MG/DL (ref 0.7–1.2)
GFR AFRICAN AMERICAN: >60 ML/MIN
GFR NON-AFRICAN AMERICAN: >60 ML/MIN
GFR SERPL CREATININE-BSD FRML MDRD: ABNORMAL ML/MIN/{1.73_M2}
GFR SERPL CREATININE-BSD FRML MDRD: ABNORMAL ML/MIN/{1.73_M2}
GLUCOSE BLD-MCNC: 103 MG/DL (ref 70–99)
HCT VFR BLD CALC: 42.9 % (ref 40.7–50.3)
HEMOGLOBIN: 14 G/DL (ref 13–17)
MCH RBC QN AUTO: 29.5 PG (ref 25.2–33.5)
MCHC RBC AUTO-ENTMCNC: 32.6 G/DL (ref 28.4–34.8)
MCV RBC AUTO: 90.3 FL (ref 82.6–102.9)
NRBC AUTOMATED: 0 PER 100 WBC
PDW BLD-RTO: 12.7 % (ref 11.8–14.4)
PLATELET # BLD: 195 K/UL (ref 138–453)
PMV BLD AUTO: 11.1 FL (ref 8.1–13.5)
POTASSIUM SERPL-SCNC: 3.7 MMOL/L (ref 3.7–5.3)
RBC # BLD: 4.75 M/UL (ref 4.21–5.77)
SODIUM BLD-SCNC: 134 MMOL/L (ref 135–144)
WBC # BLD: 12.8 K/UL (ref 3.5–11.3)

## 2020-06-01 PROCEDURE — 6370000000 HC RX 637 (ALT 250 FOR IP): Performed by: INTERNAL MEDICINE

## 2020-06-01 PROCEDURE — 36415 COLL VENOUS BLD VENIPUNCTURE: CPT

## 2020-06-01 PROCEDURE — 97535 SELF CARE MNGMENT TRAINING: CPT

## 2020-06-01 PROCEDURE — 80048 BASIC METABOLIC PNL TOTAL CA: CPT

## 2020-06-01 PROCEDURE — 6360000002 HC RX W HCPCS: Performed by: COLON & RECTAL SURGERY

## 2020-06-01 PROCEDURE — 97530 THERAPEUTIC ACTIVITIES: CPT

## 2020-06-01 PROCEDURE — 85027 COMPLETE CBC AUTOMATED: CPT

## 2020-06-01 PROCEDURE — 2060000000 HC ICU INTERMEDIATE R&B

## 2020-06-01 PROCEDURE — 2580000003 HC RX 258: Performed by: COLON & RECTAL SURGERY

## 2020-06-01 PROCEDURE — 6370000000 HC RX 637 (ALT 250 FOR IP): Performed by: COLON & RECTAL SURGERY

## 2020-06-01 PROCEDURE — 2500000003 HC RX 250 WO HCPCS: Performed by: COLON & RECTAL SURGERY

## 2020-06-01 PROCEDURE — 6360000002 HC RX W HCPCS: Performed by: INTERNAL MEDICINE

## 2020-06-01 RX ORDER — HYDROMORPHONE HYDROCHLORIDE 1 MG/ML
1 INJECTION, SOLUTION INTRAMUSCULAR; INTRAVENOUS; SUBCUTANEOUS
Status: DISCONTINUED | OUTPATIENT
Start: 2020-06-01 | End: 2020-06-02

## 2020-06-01 RX ADMIN — HYDROMORPHONE HYDROCHLORIDE 1 MG: 1 INJECTION, SOLUTION INTRAMUSCULAR; INTRAVENOUS; SUBCUTANEOUS at 04:09

## 2020-06-01 RX ADMIN — CIPROFLOXACIN 400 MG: 2 INJECTION, SOLUTION INTRAVENOUS at 22:22

## 2020-06-01 RX ADMIN — ENOXAPARIN SODIUM 40 MG: 40 INJECTION SUBCUTANEOUS at 09:07

## 2020-06-01 RX ADMIN — HYDROMORPHONE HYDROCHLORIDE 1 MG: 1 INJECTION, SOLUTION INTRAMUSCULAR; INTRAVENOUS; SUBCUTANEOUS at 22:23

## 2020-06-01 RX ADMIN — Medication 10 ML: at 19:40

## 2020-06-01 RX ADMIN — CIPROFLOXACIN 400 MG: 2 INJECTION, SOLUTION INTRAVENOUS at 10:49

## 2020-06-01 RX ADMIN — CLONAZEPAM 0.5 MG: 0.5 TABLET ORAL at 20:35

## 2020-06-01 RX ADMIN — AMIODARONE HYDROCHLORIDE 200 MG: 200 TABLET ORAL at 09:07

## 2020-06-01 RX ADMIN — HYDROMORPHONE HYDROCHLORIDE 1 MG: 1 INJECTION, SOLUTION INTRAMUSCULAR; INTRAVENOUS; SUBCUTANEOUS at 19:39

## 2020-06-01 RX ADMIN — HYDROMORPHONE HYDROCHLORIDE 1 MG: 1 INJECTION, SOLUTION INTRAMUSCULAR; INTRAVENOUS; SUBCUTANEOUS at 12:55

## 2020-06-01 RX ADMIN — METRONIDAZOLE 500 MG: 500 INJECTION, SOLUTION INTRAVENOUS at 04:03

## 2020-06-01 RX ADMIN — METRONIDAZOLE 500 MG: 500 INJECTION, SOLUTION INTRAVENOUS at 12:23

## 2020-06-01 RX ADMIN — METRONIDAZOLE 500 MG: 500 INJECTION, SOLUTION INTRAVENOUS at 19:40

## 2020-06-01 RX ADMIN — HYDROMORPHONE HYDROCHLORIDE 1 MG: 1 INJECTION, SOLUTION INTRAMUSCULAR; INTRAVENOUS; SUBCUTANEOUS at 07:30

## 2020-06-01 RX ADMIN — HYDROMORPHONE HYDROCHLORIDE 0.5 MG: 1 INJECTION, SOLUTION INTRAMUSCULAR; INTRAVENOUS; SUBCUTANEOUS at 00:23

## 2020-06-01 RX ADMIN — CLONAZEPAM 0.5 MG: 0.5 TABLET ORAL at 07:30

## 2020-06-01 RX ADMIN — HYDROMORPHONE HYDROCHLORIDE 1 MG: 1 INJECTION, SOLUTION INTRAMUSCULAR; INTRAVENOUS; SUBCUTANEOUS at 15:15

## 2020-06-01 RX ADMIN — HYDROMORPHONE HYDROCHLORIDE 0.5 MG: 1 INJECTION, SOLUTION INTRAMUSCULAR; INTRAVENOUS; SUBCUTANEOUS at 01:00

## 2020-06-01 RX ADMIN — HYDROMORPHONE HYDROCHLORIDE 1 MG: 1 INJECTION, SOLUTION INTRAMUSCULAR; INTRAVENOUS; SUBCUTANEOUS at 09:51

## 2020-06-01 ASSESSMENT — PAIN SCALES - GENERAL
PAINLEVEL_OUTOF10: 7
PAINLEVEL_OUTOF10: 7
PAINLEVEL_OUTOF10: 10
PAINLEVEL_OUTOF10: 7
PAINLEVEL_OUTOF10: 8
PAINLEVEL_OUTOF10: 7
PAINLEVEL_OUTOF10: 8
PAINLEVEL_OUTOF10: 7
PAINLEVEL_OUTOF10: 5
PAINLEVEL_OUTOF10: 8
PAINLEVEL_OUTOF10: 8

## 2020-06-01 NOTE — PROGRESS NOTES
Physical Therapy  DATE: 2020    NAME: Cyril Hart  MRN: [id number]   : 1950    Patient not seen this date for Physical Therapy due to:  [] Blood transfusion in progress  [] Cancel by RN  [] Hemodialysis  []  Refusal by Patient   [] Spine Precautions   [] Strict Bedrest  [] Surgery  [] Testing      [x] Other     Pt with nursing getting back to bed. Nursing states pt has been up all morning. [] PT being discontinued at this time. Patient independent. No further needs. [] PT being discontinued at this time as the patient has been transferred to hospice care. No further needs.     Santiago Sullivan, PTA

## 2020-06-01 NOTE — PROGRESS NOTES
Occupational Therapy  Facility/Department: [facility] PROGRESSIVE CARE  Daily Treatment Note  NAME: Jose Vazquez  : 1950  MRN: [id number]    Date of Service: 2020    Discharge Recommendations:  Home with assist PRN, Home with Home health OT       Assessment   Performance deficits / Impairments: Decreased functional mobility ; Decreased endurance;Decreased ADL status; Decreased high-level IADLs;Decreased balance;Decreased safe awareness  Assessment: Skilled OT is indicated to increase overall I and safety awareness in function to return home with assist as needed. Prognosis: Good  REQUIRES OT FOLLOW UP: Yes  Activity Tolerance  Activity Tolerance: Patient Tolerated treatment well;Patient limited by fatigue  Activity Tolerance: fair minus   Safety Devices  Safety Devices in place: Yes  Type of devices: Bed alarm in place; Left in bed;Call light within reach;Nurse notified         Patient Diagnosis(es): There were no encounter diagnoses. has a past medical history of Anxiety, Cancer ([facility] [address] [place] (hard of hearing), Hx of cold sores, IBS (irritable bowel syndrome), Kidney stone, Neuropathy, PTSD (post-traumatic stress disorder), and Wears glasses. has a past surgical history that includes Colonoscopy; Cosmetic surgery; Tooth Extraction; and hemicolectomy (N/A, 2020).     Restrictions  Restrictions/Precautions  Restrictions/Precautions: General Precautions, Up as Tolerated  Required Braces or Orthoses?: No  Position Activity Restriction  Other position/activity restrictions: clear diet, RUE IV, amb pt, pt may shower, up as yaya and up in chair  Subjective   General  Chart Reviewed: Yes  Patient assessed for rehabilitation services?: Yes  Response to previous treatment: Patient with no complaints from previous session  Family / Caregiver Present: No      Orientation  Orientation  Overall Orientation Status: Within Normal Limits  Objective    ADL  Grooming: Supervision(standing at sink to wash hands)  Toileting:

## 2020-06-01 NOTE — PLAN OF CARE
Problem:  Bowel/Gastric:  Goal: Control of bowel function will improve  Description: Control of bowel function will improve  Outcome: Ongoing     Problem: Skin Integrity:  Goal: Risk for impaired skin integrity will decrease  Description: Risk for impaired skin integrity will decrease  Outcome: Ongoing     Problem: Pain:  Goal: Pain level will decrease  Description: Pain level will decrease  Outcome: Ongoing

## 2020-06-01 NOTE — PLAN OF CARE
Problem: Bowel/Gastric:  Goal: Control of bowel function will improve  Description: Control of bowel function will improve  6/1/2020 1200 by Abel Meraz RN  Outcome: Ongoing    Pt has had small bowel movements yesterday. Encouraged increase in activity. and participation in physical therapy. Reviewed side effects of pain medications. Appetite poor at this time. Problem: Pain:  Goal: Pain level will decrease  Description: Pain level will decrease  6/1/2020 1200 by Abel Meraz RN  Outcome: Ongoing    Pt does have relief of pain with current medications.     6/1/2020 0303 by Nicole Sauer RN  Outcome: Ongoing     6/1/2020 0303 by Nicole Sauer RN  Outcome: Ongoing

## 2020-06-01 NOTE — PROGRESS NOTES
Patient continues to complain of pain not being controlled. Writer offered to help reposition and instruct patient on deep breathing and splinting exercises. Writer paged Dr. Melissa De León regarding pain complaints. Orders received to increase PRN dilaudid to 1mg q2-3hrs for pain as well as a one time dose of 0.5mg dilaudid now (0100). Patient updated on plan of care, see MAR for detail. Will continue to monitor patient's pain level closely.

## 2020-06-02 LAB
ANION GAP SERPL CALCULATED.3IONS-SCNC: 13 MMOL/L (ref 9–17)
BUN BLDV-MCNC: 9 MG/DL (ref 8–23)
BUN/CREAT BLD: 11 (ref 9–20)
CALCIUM SERPL-MCNC: 8.6 MG/DL (ref 8.6–10.4)
CHLORIDE BLD-SCNC: 98 MMOL/L (ref 98–107)
CO2: 25 MMOL/L (ref 20–31)
CREAT SERPL-MCNC: 0.79 MG/DL (ref 0.7–1.2)
GFR AFRICAN AMERICAN: >60 ML/MIN
GFR NON-AFRICAN AMERICAN: >60 ML/MIN
GFR SERPL CREATININE-BSD FRML MDRD: ABNORMAL ML/MIN/{1.73_M2}
GFR SERPL CREATININE-BSD FRML MDRD: ABNORMAL ML/MIN/{1.73_M2}
GLUCOSE BLD-MCNC: 105 MG/DL (ref 70–99)
HCT VFR BLD CALC: 42.3 % (ref 40.7–50.3)
HEMOGLOBIN: 14.2 G/DL (ref 13–17)
MCH RBC QN AUTO: 30.1 PG (ref 25.2–33.5)
MCHC RBC AUTO-ENTMCNC: 33.6 G/DL (ref 28.4–34.8)
MCV RBC AUTO: 89.6 FL (ref 82.6–102.9)
NRBC AUTOMATED: 0 PER 100 WBC
PDW BLD-RTO: 12.7 % (ref 11.8–14.4)
PLATELET # BLD: 205 K/UL (ref 138–453)
PMV BLD AUTO: 10.9 FL (ref 8.1–13.5)
POTASSIUM SERPL-SCNC: 3.7 MMOL/L (ref 3.7–5.3)
RBC # BLD: 4.72 M/UL (ref 4.21–5.77)
SODIUM BLD-SCNC: 136 MMOL/L (ref 135–144)
WBC # BLD: 12.1 K/UL (ref 3.5–11.3)

## 2020-06-02 PROCEDURE — 2500000003 HC RX 250 WO HCPCS: Performed by: COLON & RECTAL SURGERY

## 2020-06-02 PROCEDURE — 2580000003 HC RX 258: Performed by: COLON & RECTAL SURGERY

## 2020-06-02 PROCEDURE — 2060000000 HC ICU INTERMEDIATE R&B

## 2020-06-02 PROCEDURE — 6360000002 HC RX W HCPCS: Performed by: COLON & RECTAL SURGERY

## 2020-06-02 PROCEDURE — 36415 COLL VENOUS BLD VENIPUNCTURE: CPT

## 2020-06-02 PROCEDURE — 97116 GAIT TRAINING THERAPY: CPT

## 2020-06-02 PROCEDURE — 6370000000 HC RX 637 (ALT 250 FOR IP): Performed by: COLON & RECTAL SURGERY

## 2020-06-02 PROCEDURE — 2500000003 HC RX 250 WO HCPCS: Performed by: STUDENT IN AN ORGANIZED HEALTH CARE EDUCATION/TRAINING PROGRAM

## 2020-06-02 PROCEDURE — 80048 BASIC METABOLIC PNL TOTAL CA: CPT

## 2020-06-02 PROCEDURE — 97535 SELF CARE MNGMENT TRAINING: CPT | Performed by: NURSE PRACTITIONER

## 2020-06-02 PROCEDURE — 97530 THERAPEUTIC ACTIVITIES: CPT

## 2020-06-02 PROCEDURE — 6370000000 HC RX 637 (ALT 250 FOR IP): Performed by: INTERNAL MEDICINE

## 2020-06-02 PROCEDURE — 6360000002 HC RX W HCPCS: Performed by: STUDENT IN AN ORGANIZED HEALTH CARE EDUCATION/TRAINING PROGRAM

## 2020-06-02 PROCEDURE — 85027 COMPLETE CBC AUTOMATED: CPT

## 2020-06-02 PROCEDURE — 6360000002 HC RX W HCPCS: Performed by: INTERNAL MEDICINE

## 2020-06-02 PROCEDURE — 6370000000 HC RX 637 (ALT 250 FOR IP): Performed by: STUDENT IN AN ORGANIZED HEALTH CARE EDUCATION/TRAINING PROGRAM

## 2020-06-02 RX ORDER — ACETAMINOPHEN 500 MG
1000 TABLET ORAL EVERY 8 HOURS PRN
Status: DISCONTINUED | OUTPATIENT
Start: 2020-06-02 | End: 2020-06-03 | Stop reason: HOSPADM

## 2020-06-02 RX ORDER — OXYCODONE HYDROCHLORIDE 5 MG/1
5 TABLET ORAL EVERY 6 HOURS PRN
Status: DISCONTINUED | OUTPATIENT
Start: 2020-06-02 | End: 2020-06-03 | Stop reason: HOSPADM

## 2020-06-02 RX ADMIN — CIPROFLOXACIN 400 MG: 2 INJECTION, SOLUTION INTRAVENOUS at 22:36

## 2020-06-02 RX ADMIN — AMIODARONE HYDROCHLORIDE 200 MG: 200 TABLET ORAL at 07:56

## 2020-06-02 RX ADMIN — IBUPROFEN 600 MG: 400 TABLET, FILM COATED ORAL at 18:32

## 2020-06-02 RX ADMIN — OXYCODONE HYDROCHLORIDE 5 MG: 5 TABLET ORAL at 07:56

## 2020-06-02 RX ADMIN — METRONIDAZOLE 500 MG: 500 INJECTION, SOLUTION INTRAVENOUS at 21:00

## 2020-06-02 RX ADMIN — METRONIDAZOLE 500 MG: 500 INJECTION, SOLUTION INTRAVENOUS at 13:46

## 2020-06-02 RX ADMIN — METRONIDAZOLE 500 MG: 500 INJECTION, SOLUTION INTRAVENOUS at 04:38

## 2020-06-02 RX ADMIN — HYDROMORPHONE HYDROCHLORIDE 1 MG: 1 INJECTION, SOLUTION INTRAMUSCULAR; INTRAVENOUS; SUBCUTANEOUS at 04:46

## 2020-06-02 RX ADMIN — HYDROMORPHONE HYDROCHLORIDE 1 MG: 1 INJECTION, SOLUTION INTRAMUSCULAR; INTRAVENOUS; SUBCUTANEOUS at 02:40

## 2020-06-02 RX ADMIN — CIPROFLOXACIN 400 MG: 2 INJECTION, SOLUTION INTRAVENOUS at 12:14

## 2020-06-02 RX ADMIN — OXYCODONE HYDROCHLORIDE 5 MG: 5 TABLET ORAL at 14:08

## 2020-06-02 RX ADMIN — Medication 10 ML: at 20:59

## 2020-06-02 RX ADMIN — OXYCODONE HYDROCHLORIDE 5 MG: 5 TABLET ORAL at 20:59

## 2020-06-02 RX ADMIN — ENOXAPARIN SODIUM 40 MG: 40 INJECTION SUBCUTANEOUS at 07:57

## 2020-06-02 RX ADMIN — CLONAZEPAM 0.5 MG: 0.5 TABLET ORAL at 14:08

## 2020-06-02 RX ADMIN — IBUPROFEN 600 MG: 400 TABLET, FILM COATED ORAL at 12:14

## 2020-06-02 RX ADMIN — HYDROMORPHONE HYDROCHLORIDE 1 MG: 1 INJECTION, SOLUTION INTRAMUSCULAR; INTRAVENOUS; SUBCUTANEOUS at 00:33

## 2020-06-02 ASSESSMENT — PAIN DESCRIPTION - LOCATION: LOCATION: ABDOMEN

## 2020-06-02 ASSESSMENT — PAIN SCALES - GENERAL
PAINLEVEL_OUTOF10: 7
PAINLEVEL_OUTOF10: 8
PAINLEVEL_OUTOF10: 8
PAINLEVEL_OUTOF10: 5
PAINLEVEL_OUTOF10: 7
PAINLEVEL_OUTOF10: 8
PAINLEVEL_OUTOF10: 7

## 2020-06-02 ASSESSMENT — PAIN DESCRIPTION - ORIENTATION: ORIENTATION: MID

## 2020-06-02 ASSESSMENT — PAIN DESCRIPTION - DESCRIPTORS: DESCRIPTORS: DISCOMFORT

## 2020-06-02 ASSESSMENT — PAIN DESCRIPTION - PAIN TYPE: TYPE: SURGICAL PAIN

## 2020-06-02 ASSESSMENT — PAIN DESCRIPTION - FREQUENCY: FREQUENCY: INTERMITTENT

## 2020-06-02 NOTE — PROGRESS NOTES
Within Functional Limits  Objective    ADL  Grooming: Supervision  Toileting: Supervision        Balance  Sitting Balance: Supervision  Standing Balance: Stand by assistance  Functional Mobility  Functional - Mobility Device: No device  Activity: To/from bathroom  Assist Level: Supervision  Functional Mobility Comments: Pt moving quickly and impulsively, almost losingn balance and staggering anteriorly  Toilet Transfers  Toilet - Technique: Ambulating  Equipment Used: Standard toilet  Toilet Transfer: Supervision  Bed mobility  Supine to Sit: Supervision  Scooting: Supervision  Comment: Pt impulsive and moving quickly upon arrival to use restroom. Transfers  Sit to stand: Supervision  Stand to sit: Supervision      Cognition  Overall Cognitive Status: Exceptions  Safety Judgement: Decreased awareness of need for assistance;Decreased awareness of need for safety           Plan   Plan  Times per week: 4-5x/week 1x/day as yaya   Current Treatment Recommendations: Strengthening, Balance Training, Neuromuscular Re-education, Safety Education & Training, Self-Care / ADL, Endurance Training, Pain Management, Equipment Evaluation, Education, & procurement, Functional Mobility Training, Patient/Caregiver Education & Training, Home Management Training    AM-PAC Score        AM-[facility] Inpatient Daily Activity Raw Score: 19 (06/02/20 1208)  AM-PAC Inpatient ADL T-Scale Score : 40.22 (06/02/20 1208)  ADL Inpatient CMS 0-100% Score: 42.8 (06/02/20 1208)  ADL Inpatient CMS G-Code Modifier : CK (06/02/20 1208)    Goals  Short term goals  Time Frame for Short term goals: by discharge, pt to demo   Short term goal 1: bed mob tasks with use of rail as needed to MOD I. Short term goal 2: I with BUE HEP with use of hand outs as needed to maintain functional use. Short term goal 3: ADL transfers and functional mob with I.  Short term goal 4: total body ADL tasks with use of AE/grab bar as needed with MOD I.   Short term goal 5: standing to good and yaya > 7 mins as able to reduce falls with self care. Long term goals  Long term goal 1: Pt to be I with EC/WS and fall prevention tech, pain mgt tech, and DME/AE recommendations with use of hand outs as needed. Patient Goals   Patient goals : get this pain under control and go home soon! Therapy Time   Individual Concurrent Group Co-treatment   Time In 1140         Time Out 0644         Minutes 26              Upon writer exit, call light within reach, pt retired to chair. All lines intact and patient positioned comfortably. All patient needs addressed prior to ending therapy session. Chart reviewed prior to treatment and patient is agreeable for therapy. RN reports patient is medically stable for therapy treatment this date.     Gabriela Hodgkin, OTA

## 2020-06-02 NOTE — DISCHARGE INSTR - COC
Continuity of Care Form    Patient Name: Cyril Hart   :  1950  MRN:  [id number]    Admit date:  2020  Discharge date:  ***    Code Status Order: Full Code   Advance Directives:   Advance Care Flowsheet Documentation     Date/Time Healthcare Directive Type of Healthcare Directive Copy in [address] Agent's Name Healthcare Agent's Phone Number    20 1027  No, patient does not have an advance directive for healthcare treatment -- -- -- -- --          Admitting Physician:  Crissy Pollard MD  PCP: Mami Urbina MD    Discharging Nurse: [facility] Unit/Room#: 1007/1007-02  Discharging Unit Phone Number: ***    Emergency Contact:   Extended Emergency Contact Information  Primary Emergency Contact: [address] [place] [address] Phone: [phone]  Work Phone: [phone]  Mobile Phone: [phone]  Relation: Spouse  Preferred language: English   needed? No    Past Surgical History:  Past Surgical History:   Procedure Laterality Date    COLONOSCOPY      2020    COSMETIC SURGERY      forehead scar revision    HEMICOLECTOMY N/A 2020    ABDOMINAL EXPLORATION WITH EXCISION OF TRANSVERSE COLON/RIGHT COLECTOMY performed by Crissy Pollard MD at [facility]         Immunization History: There is no immunization history on file for this patient.     Active Problems:  Patient Active Problem List   Diagnosis Code    Colon polyps K63.5       Isolation/Infection:   Isolation          No Isolation        Patient Infection Status     Infection Onset Added Last Indicated Last Indicated By Review Planned Expiration Resolved Resolved By    None active    Resolved    COVID-19 Rule Out 20 COVID-19 (Ordered)   20 Rule-Out Test Resulted          Nurse Assessment:  Last Vital Signs: /70   Pulse 75   Temp 98.4 °F (36.9 °C) (Oral)   Resp 18   Ht 5' 10\" (1.778 m)   Wt 189 lb (85.7 kg)   SpO2 95%   BMI 27.12 kg/m²     Last documented pain score (0-10 scale): Pain Level: 7  Last Weight:   Wt Readings from Last 1 Encounters:   20 189 lb (85.7 kg)     Mental Status:  {IP PT MENTAL STATUS:}    IV Access:  { ELIANA IV ACCESS:251649026}    Nursing Mobility/ADLs:  Walking   {CHP DME GWTO:410792446}  Transfer  {CHP DME PMCU:672110667}  Bathing  {CHP DME LCQN:398792082}  Dressing  {CHP DME GETU:857576317}  Toileting  {CHP DME NAIV:387612008}  Feeding  {CHP DME UEEH:796530984}  Med Admin  {CHP DME STHJ:897178357}  Med Delivery   { ELIANA MED Delivery:999103250}    Wound Care Documentation and Therapy:        Elimination:  Continence:   · Bowel: {YES / LW:57381}  · Bladder: {YES / AU:80206}  Urinary Catheter: {Urinary Catheter:807460801}   Colostomy/Ileostomy/Ileal Conduit: {YES / XK:57475}       Date of Last BM: ***    Intake/Output Summary (Last 24 hours) at 2020 0546  Last data filed at 2020 0545  Gross per 24 hour   Intake 707 ml   Output 200 ml   Net 507 ml     I/O last 3 completed shifts: In: 18 [P.O.:120;  I.V.:932]  Out: 200 [Urine:200]    Safety Concerns:     [address] Safety Concerns:887060744}    Impairments/Disabilities:      [address] Impairments/Disabilities:763982091}    Nutrition Therapy:  Current Nutrition Therapy:   [address] Diet List:135468697}    Routes of Feeding: {P DME Other Feedings:924462739}  Liquids: {Slp liquid thickness:19515}  Daily Fluid Restriction: {CHP DME Yes amt example:163846887}  Last Modified Barium Swallow with Video (Video Swallowing Test): {Done Not Done UDNK:122638943}    Treatments at the Time of Hospital Discharge:   Respiratory Treatments: ***  Oxygen Therapy:  {Therapy; copd oxygen:09482}  Ventilator:    { CC Vent VVKZ:748966205}    Rehab Therapies: {THERAPEUTIC INTERVENTION:4473940154}  Weight Bearing Status/Restrictions: [address] CHRISTIE Weight Bearin}  Other Medical Equipment (for information only, NOT a DME order):  {EQUIPMENT:012500006}  Other Treatments:

## 2020-06-02 NOTE — PROGRESS NOTES
Physical Therapy  Facility/Department: [facility] PROGRESSIVE CARE  Daily Treatment Note  NAME: Cherise Grier  : 1950  MRN: [id number]    Date of Service: 2020    Discharge Recommendations:  Home with assist PRN        Assessment   Body structures, Functions, Activity limitations: Decreased functional mobility ; Decreased ADL status; Decreased endurance; Increased pain  Assessment: Recommend patient home with assist as needed. Patient will benefit from continued PT to improve ambulation distance and prevent complications of immobility. Prognosis: Good  Decision Making: Medium Complexity  REQUIRES PT FOLLOW UP: Yes  Activity Tolerance  Activity Tolerance: Patient Tolerated treatment well     Patient Diagnosis(es): The encounter diagnosis was Postoperative pain. has a past medical history of Anxiety, Cancer ([facility] [address] [place] (hard of hearing), Hx of cold sores, IBS (irritable bowel syndrome), Kidney stone, Neuropathy, PTSD (post-traumatic stress disorder), and Wears glasses. has a past surgical history that includes Colonoscopy; Cosmetic surgery; Tooth Extraction; and hemicolectomy (N/A, 2020). Restrictions  Restrictions/Precautions  Restrictions/Precautions: General Precautions, Up as Tolerated  Required Braces or Orthoses?: No  Position Activity Restriction  Other position/activity restrictions: clear diet, RUE IV, amb pt, pt may shower, up as yaya and up in chair  Subjective   General  Chart Reviewed: Yes  Additional Pertinent Hx: Anxiety, IBS, Neuropathy, PTSD  Family / Caregiver Present: No  Subjective  Subjective: Patient pleasant and agreeable to PT  General Comment  Comments: ERIKA Salinas reports patient appropriate for PT.           Orientation     Cognition      Objective      Transfers  Sit to Stand: Stand by assistance  Stand to sit: Stand by assistance  Bed to Chair: Stand by assistance  Stand Pivot Transfers: Stand by assistance  Ambulation  Ambulation?: Yes  More Ambulation?: Yes  Ambulation 1  Surface: level tile  Device: No Device  Assistance: Stand by assistance  Quality of Gait: guarded but steady  Gait Deviations: Slow Melani;Decreased step height  Distance: 60ft  Ambulation 2  Surface - 2: level tile  Device 2: No device  Assistance 2: Stand by assistance  Quality of Gait 2: slow and steady  Gait Deviations: Slow Melani  Distance: 10ft x2  Comments: to bathroom   Stairs/Curb  Stairs?: No     Balance  Sitting - Static: Good  Sitting - Dynamic: Good  Standing - Static: Good;-  Standing - Dynamic: Good;-  Exercises  Comments: Seated LE exercises for ROM and circulation. Other Activities: Other (see comment)(Assisted pt to the bathroom to brush his teeth. SBA for oral care)       AM-PAC Score     AM-PAC Inpatient Mobility without Stair Climbing Raw Score : 15 (06/02/20 1309)  AM-PAC Inpatient without Stair Climbing T-Scale Score : 43.03 (06/02/20 1309)  Mobility Inpatient CMS 0-100% Score: 47.43 (06/02/20 1309)  Mobility Inpatient without Stair CMS G-Code Modifier : CK (06/02/20 1309)       Goals  Short term goals  Time Frame for Short term goals: 12 visits  Short term goal 1: Patient will be indep with bed mobility and transfers. Short term goal 2: Patient will amb 300 feet indep. Short term goal 3: Patient will negotiate 5 steps with rails indep. Short term goal 4: Patient will tolerate 30 minutes of ther-ex and ther-act.   Patient Goals   Patient goals : pain control    Plan    Plan  Times per week: 1-2x/d, 6-7 days a week  Current Treatment Recommendations: Balance Training, Functional Mobility Training, Transfer Training, Endurance Training, Gait Training, Stair training, Patient/Caregiver Education & Training, Safety Education & Training, Home Exercise Program, Strengthening  Safety Devices  Type of devices: Nurse notified, Left in chair, Call light within reach, All fall risk precautions in place     Therapy Time   Individual Concurrent Group Co-treatment   Time In  12:26         Time Out  [address] [place], [place]

## 2020-06-03 VITALS
OXYGEN SATURATION: 95 % | DIASTOLIC BLOOD PRESSURE: 58 MMHG | TEMPERATURE: 98.2 F | HEIGHT: 70 IN | HEART RATE: 63 BPM | SYSTOLIC BLOOD PRESSURE: 120 MMHG | RESPIRATION RATE: 16 BRPM | WEIGHT: 189 LBS | BODY MASS INDEX: 27.06 KG/M2

## 2020-06-03 LAB
ANION GAP SERPL CALCULATED.3IONS-SCNC: 12 MMOL/L (ref 9–17)
BUN BLDV-MCNC: 9 MG/DL (ref 8–23)
BUN/CREAT BLD: 11 (ref 9–20)
CALCIUM SERPL-MCNC: 8.7 MG/DL (ref 8.6–10.4)
CHLORIDE BLD-SCNC: 104 MMOL/L (ref 98–107)
CO2: 25 MMOL/L (ref 20–31)
CREAT SERPL-MCNC: 0.85 MG/DL (ref 0.7–1.2)
GFR AFRICAN AMERICAN: >60 ML/MIN
GFR NON-AFRICAN AMERICAN: >60 ML/MIN
GFR SERPL CREATININE-BSD FRML MDRD: ABNORMAL ML/MIN/{1.73_M2}
GFR SERPL CREATININE-BSD FRML MDRD: ABNORMAL ML/MIN/{1.73_M2}
GLUCOSE BLD-MCNC: 104 MG/DL (ref 70–99)
HCT VFR BLD CALC: 44 % (ref 40.7–50.3)
HEMOGLOBIN: 14.6 G/DL (ref 13–17)
MCH RBC QN AUTO: 29.7 PG (ref 25.2–33.5)
MCHC RBC AUTO-ENTMCNC: 33.2 G/DL (ref 28.4–34.8)
MCV RBC AUTO: 89.4 FL (ref 82.6–102.9)
NRBC AUTOMATED: 0 PER 100 WBC
PDW BLD-RTO: 12.9 % (ref 11.8–14.4)
PLATELET # BLD: 232 K/UL (ref 138–453)
PMV BLD AUTO: 10.9 FL (ref 8.1–13.5)
POTASSIUM SERPL-SCNC: 3.7 MMOL/L (ref 3.7–5.3)
RBC # BLD: 4.92 M/UL (ref 4.21–5.77)
SODIUM BLD-SCNC: 141 MMOL/L (ref 135–144)
WBC # BLD: 12.3 K/UL (ref 3.5–11.3)

## 2020-06-03 PROCEDURE — 6360000002 HC RX W HCPCS: Performed by: INTERNAL MEDICINE

## 2020-06-03 PROCEDURE — 0DTL0ZZ RESECTION OF TRANSVERSE COLON, OPEN APPROACH: ICD-10-PCS | Performed by: COLON & RECTAL SURGERY

## 2020-06-03 PROCEDURE — 6370000000 HC RX 637 (ALT 250 FOR IP): Performed by: COLON & RECTAL SURGERY

## 2020-06-03 PROCEDURE — 2500000003 HC RX 250 WO HCPCS: Performed by: STUDENT IN AN ORGANIZED HEALTH CARE EDUCATION/TRAINING PROGRAM

## 2020-06-03 PROCEDURE — 0DTK0ZZ RESECTION OF ASCENDING COLON, OPEN APPROACH: ICD-10-PCS | Performed by: COLON & RECTAL SURGERY

## 2020-06-03 PROCEDURE — 85027 COMPLETE CBC AUTOMATED: CPT

## 2020-06-03 PROCEDURE — 6370000000 HC RX 637 (ALT 250 FOR IP): Performed by: INTERNAL MEDICINE

## 2020-06-03 PROCEDURE — 80048 BASIC METABOLIC PNL TOTAL CA: CPT

## 2020-06-03 PROCEDURE — 6370000000 HC RX 637 (ALT 250 FOR IP): Performed by: STUDENT IN AN ORGANIZED HEALTH CARE EDUCATION/TRAINING PROGRAM

## 2020-06-03 PROCEDURE — 36415 COLL VENOUS BLD VENIPUNCTURE: CPT

## 2020-06-03 RX ORDER — OXYCODONE HYDROCHLORIDE 5 MG/1
5 TABLET ORAL EVERY 6 HOURS PRN
Qty: 28 TABLET | Refills: 0 | Status: SHIPPED | OUTPATIENT
Start: 2020-06-03 | End: 2020-06-10

## 2020-06-03 RX ORDER — IBUPROFEN 600 MG/1
400 TABLET ORAL EVERY 6 HOURS PRN
Qty: 120 TABLET | Refills: 3 | Status: ON HOLD | OUTPATIENT
Start: 2020-06-03 | End: 2020-08-12

## 2020-06-03 RX ORDER — AMIODARONE HYDROCHLORIDE 200 MG/1
200 TABLET ORAL DAILY
Qty: 30 TABLET | Refills: 3 | Status: SHIPPED | OUTPATIENT
Start: 2020-06-03 | End: 2020-08-07

## 2020-06-03 RX ORDER — ACETAMINOPHEN 500 MG
1000 TABLET ORAL EVERY 8 HOURS PRN
Qty: 120 TABLET | Refills: 3 | Status: SHIPPED | OUTPATIENT
Start: 2020-06-03

## 2020-06-03 RX ADMIN — AMIODARONE HYDROCHLORIDE 200 MG: 200 TABLET ORAL at 08:18

## 2020-06-03 RX ADMIN — CLONAZEPAM 0.5 MG: 0.5 TABLET ORAL at 08:18

## 2020-06-03 RX ADMIN — IBUPROFEN 600 MG: 400 TABLET, FILM COATED ORAL at 00:46

## 2020-06-03 RX ADMIN — METRONIDAZOLE 500 MG: 500 INJECTION, SOLUTION INTRAVENOUS at 04:05

## 2020-06-03 RX ADMIN — ENOXAPARIN SODIUM 40 MG: 40 INJECTION SUBCUTANEOUS at 08:18

## 2020-06-03 RX ADMIN — IBUPROFEN 600 MG: 400 TABLET, FILM COATED ORAL at 08:18

## 2020-06-03 RX ADMIN — OXYCODONE HYDROCHLORIDE 5 MG: 5 TABLET ORAL at 04:04

## 2020-06-03 ASSESSMENT — PAIN DESCRIPTION - PAIN TYPE
TYPE: SURGICAL PAIN
TYPE: SURGICAL PAIN

## 2020-06-03 ASSESSMENT — PAIN DESCRIPTION - LOCATION
LOCATION: ABDOMEN
LOCATION: ABDOMEN

## 2020-06-03 ASSESSMENT — PAIN SCALES - GENERAL
PAINLEVEL_OUTOF10: 5
PAINLEVEL_OUTOF10: 3
PAINLEVEL_OUTOF10: 0
PAINLEVEL_OUTOF10: 6

## 2020-06-03 ASSESSMENT — PAIN DESCRIPTION - ORIENTATION
ORIENTATION: MID
ORIENTATION: MID

## 2020-06-03 ASSESSMENT — PAIN DESCRIPTION - ONSET: ONSET: ON-GOING

## 2020-06-03 ASSESSMENT — PAIN DESCRIPTION - FREQUENCY
FREQUENCY: INTERMITTENT
FREQUENCY: CONTINUOUS

## 2020-06-03 ASSESSMENT — PAIN DESCRIPTION - DESCRIPTORS
DESCRIPTORS: DISCOMFORT
DESCRIPTORS: DISCOMFORT

## 2020-06-03 NOTE — DISCHARGE SUMMARY
Colorectal Surgery Discharge Summary     Patient Identification  Cherise Grier is a 69 y.o. male. :  1950  Admit Date:  2020    Discharge date:   6/3/2020 11:01 AM                                   Disposition: Home with Home Care    Discharge Diagnoses:   Patient Active Problem List   Diagnosis    Colon polyps       Condition on discharge: Stable    Consults: Cardiology    Surgery: Exploratory laparotomy with ascending and transverse colon resection and primary anastomosis    Patient Instructions: Activity: no heavy lifting, pushing, pulling for 6 weeks, no driving until cleared by surgeon  Diet: As tolerated  Follow-up with Dr Irvin Kim in 1-2 weeks. See pre-printed instructions in chart and given to patient upon discharge. Discharge Medications:      Obey Jacobsen   Home Medication Instructions M:[id number]    Printed on:20 7211   Medication Information                      acetaminophen (TYLENOL) 500 MG tablet  Take 2 tablets by mouth every 8 hours as needed for Pain or Fever             amiodarone (CORDARONE) 200 MG tablet  Take 1 tablet by mouth daily             aspirin 81 MG chewable tablet  Take 81 mg by mouth daily             clonazePAM (KLONOPIN) 0.5 MG tablet  Take 0.5 mg by mouth 2 times daily as needed. ibuprofen (ADVIL;MOTRIN) 600 MG tablet  Take 0.5 tablets by mouth every 6 hours as needed for Pain             Multiple Vitamins-Minerals (THERAPEUTIC MULTIVITAMIN-MINERALS) tablet  Take 1 tablet by mouth daily             oxyCODONE (ROXICODONE) 5 MG immediate release tablet  Take 1 tablet by mouth every 6 hours as needed for Pain for up to 7 days. Intended supply: 5 days. Take lowest dose possible to manage pain                  HPI and Hospital Course:   69 y.o. male presented on 2020 for elective ex lap and bowel resection. Patient tolerated the procedure without complication. Did go into a fib postoperatively, now controlled on amiodarone.  Patient had

## 2020-06-03 NOTE — PROGRESS NOTES
Colorectal Surgery Progress Note            PATIENT NAME: Vineet Marks       TODAY'S DATE: 6/3/2020, 6:09 AM      SUBJECTIVE:    Pt seen and examined. VSS, afebrile. Patient reports pain is improved. Had several questions about details of surgery. Still passing flatus and having BM. OBJECTIVE:   VITALS:  /64   Pulse 65   Temp 97.5 °F (36.4 °C) (Oral)   Resp 16   Ht 5' 10\" (1.778 m)   Wt 189 lb (85.7 kg)   SpO2 98%   BMI 27.12 kg/m²      INTAKE/OUTPUT:      Intake/Output Summary (Last 24 hours) at 6/3/2020 0609  Last data filed at 6/2/2020 1346  Gross per 24 hour   Intake 240 ml   Output --   Net 240 ml       PHYSICAL EXAM  GEN: Alert, awake, oriented, NAD  HEENT: moist mucous membranes  CV: RRR  LUNG: no respiratory distress, no audible wheezing  ABDOMEN: soft, minimally TTP around midline laparotomy incision; incision with staples intact; C/D/I without drainage    Data:    CBC:   Lab Results   Component Value Date    WBC 12.3 06/03/2020    RBC 4.92 06/03/2020    HGB 14.6 06/03/2020    HCT 44.0 06/03/2020    MCV 89.4 06/03/2020    MCH 29.7 06/03/2020    MCHC 33.2 06/03/2020    RDW 12.9 06/03/2020     06/03/2020    MPV 10.9 06/03/2020     CMP:    Lab Results   Component Value Date     06/02/2020    K 3.7 06/02/2020    CL 98 06/02/2020    CO2 25 06/02/2020    BUN 9 06/02/2020    CREATININE 0.79 06/02/2020    GFRAA >60 06/02/2020    LABGLOM >60 06/02/2020    GLUCOSE 105 06/02/2020    CALCIUM 8.6 06/02/2020       Radiology Review:      No new imaging     ASSESSMENT     POD#6 ex-lap, ascending and transverse colectomy with cecal/descending colonic anastomosis. Plan    · Continue low fiber diet  · PO pain control PRN  · Continue Amioadarone PO   · Saline lock IVF  · Continue ambulation/frequent IS use. · Discharge planning - home today    Electronically signed by Liseth Estrella MD  on 6/3/2020 at 6:09 AM   I Dr. Kendal Rai saw and examined the patient.  I have edited the above and agree with

## 2020-06-03 NOTE — PLAN OF CARE
Problem: Bowel/Gastric:  Goal: Control of bowel function will improve  Description: Control of bowel function will improve  Outcome: Completed  Goal: Ability to achieve a regular elimination pattern will improve  Description: Ability to achieve a regular elimination pattern will improve  Outcome: Completed     Problem: Nutritional:  Goal: Ability to follow a diet with enough fiber (20 to 30 grams) for normal bowel function will improve  Description: Ability to follow a diet with enough fiber (20 to 30 grams) for normal bowel function will improve  Outcome: Completed  Goal: Nutritional status will improve  Description: Nutritional status will improve  Outcome: Completed     Problem: Skin Integrity:  Goal: Risk for impaired skin integrity will decrease  Description: Risk for impaired skin integrity will decrease  6/3/2020 1009 by Lennie Mendoza RN  Outcome: Completed  6/3/2020 2639 by Linda Bunn RN  Outcome: Ongoing  Note: Patient is able to reposition himself. Goal: Demonstration of wound healing without infection will improve  Description: Demonstration of wound healing without infection will improve  Outcome: Completed  Goal: Complications related to intravenous access or infusion will be avoided or minimized  Description: Complications related to intravenous access or infusion will be avoided or minimized  Outcome: Completed     Problem: Pain:  Goal: Pain level will decrease  Description: Pain level will decrease  6/3/2020 1009 by Lennie Mendoza RN  Outcome: Completed  6/3/2020 7812 by Linda Bunn RN  Outcome: Ongoing  Note: Patient is able to communicate his pain level.   Goal: Control of acute pain  Description: Control of acute pain  Outcome: Completed  Goal: Control of chronic pain  Description: Control of chronic pain  Outcome: Completed     Problem: Physical Regulation:  Goal: Diagnostic test results will improve  Description: Diagnostic test results will improve  Outcome:

## 2020-06-04 LAB — SURGICAL PATHOLOGY REPORT: NORMAL

## 2020-06-12 ENCOUNTER — ANESTHESIA (OUTPATIENT)
Dept: OPERATING ROOM | Age: 70
DRG: 856 | End: 2020-06-12
Payer: MEDICARE

## 2020-06-12 ENCOUNTER — APPOINTMENT (OUTPATIENT)
Dept: CT IMAGING | Age: 70
DRG: 856 | End: 2020-06-12
Payer: MEDICARE

## 2020-06-12 ENCOUNTER — HOSPITAL ENCOUNTER (INPATIENT)
Age: 70
LOS: 6 days | Discharge: HOME HEALTH | DRG: 856 | End: 2020-06-18
Attending: EMERGENCY MEDICINE | Admitting: INTERNAL MEDICINE
Payer: MEDICARE

## 2020-06-12 ENCOUNTER — ANESTHESIA EVENT (OUTPATIENT)
Dept: OPERATING ROOM | Age: 70
DRG: 856 | End: 2020-06-12
Payer: MEDICARE

## 2020-06-12 VITALS
TEMPERATURE: 98.6 F | OXYGEN SATURATION: 100 % | SYSTOLIC BLOOD PRESSURE: 125 MMHG | DIASTOLIC BLOOD PRESSURE: 68 MMHG | RESPIRATION RATE: 17 BRPM

## 2020-06-12 PROBLEM — K91.89 GASTRIC ANASTOMOTIC LEAK: Status: ACTIVE | Noted: 2020-06-12

## 2020-06-12 PROBLEM — K65.1 INTRA-ABDOMINAL ABSCESS (HCC): Status: ACTIVE | Noted: 2020-06-12

## 2020-06-12 LAB
-: NORMAL
ABO/RH: NORMAL
ABSOLUTE EOS #: 1.07 K/UL (ref 0–0.44)
ABSOLUTE IMMATURE GRANULOCYTE: 0.09 K/UL (ref 0–0.3)
ABSOLUTE LYMPH #: 2.78 K/UL (ref 1.1–3.7)
ABSOLUTE MONO #: 0.7 K/UL (ref 0.1–1.2)
ALBUMIN SERPL-MCNC: 3 G/DL (ref 3.5–5.2)
ALBUMIN/GLOBULIN RATIO: ABNORMAL (ref 1–2.5)
ALP BLD-CCNC: 81 U/L (ref 40–129)
ALT SERPL-CCNC: 14 U/L (ref 5–41)
ANION GAP SERPL CALCULATED.3IONS-SCNC: 12 MMOL/L (ref 9–17)
ANTIBODY SCREEN: NEGATIVE
ARM BAND NUMBER: NORMAL
AST SERPL-CCNC: 17 U/L
BASOPHILS # BLD: 1 % (ref 0–2)
BASOPHILS ABSOLUTE: 0.12 K/UL (ref 0–0.2)
BILIRUB SERPL-MCNC: 0.27 MG/DL (ref 0.3–1.2)
BUN BLDV-MCNC: 9 MG/DL (ref 8–23)
BUN/CREAT BLD: 11 (ref 9–20)
CALCIUM SERPL-MCNC: 9.2 MG/DL (ref 8.6–10.4)
CHLORIDE BLD-SCNC: 102 MMOL/L (ref 98–107)
CO2: 26 MMOL/L (ref 20–31)
CREAT SERPL-MCNC: 0.79 MG/DL (ref 0.7–1.2)
DIFFERENTIAL TYPE: ABNORMAL
EOSINOPHILS RELATIVE PERCENT: 8 % (ref 1–4)
EXPIRATION DATE: NORMAL
GFR AFRICAN AMERICAN: >60 ML/MIN
GFR NON-AFRICAN AMERICAN: >60 ML/MIN
GFR SERPL CREATININE-BSD FRML MDRD: ABNORMAL ML/MIN/{1.73_M2}
GFR SERPL CREATININE-BSD FRML MDRD: ABNORMAL ML/MIN/{1.73_M2}
GLUCOSE BLD-MCNC: 101 MG/DL (ref 70–99)
GLUCOSE BLD-MCNC: 106 MG/DL (ref 75–110)
HCT VFR BLD CALC: 45.9 % (ref 40.7–50.3)
HEMOGLOBIN: 14.7 G/DL (ref 13–17)
IMMATURE GRANULOCYTES: 1 %
LIPASE: 31 U/L (ref 13–60)
LYMPHOCYTES # BLD: 21 % (ref 24–43)
MCH RBC QN AUTO: 28.9 PG (ref 25.2–33.5)
MCHC RBC AUTO-ENTMCNC: 32 G/DL (ref 28.4–34.8)
MCV RBC AUTO: 90.4 FL (ref 82.6–102.9)
MONOCYTES # BLD: 5 % (ref 3–12)
NRBC AUTOMATED: 0 PER 100 WBC
PDW BLD-RTO: 13.1 % (ref 11.8–14.4)
PLATELET # BLD: 489 K/UL (ref 138–453)
PLATELET ESTIMATE: ABNORMAL
PMV BLD AUTO: 10.1 FL (ref 8.1–13.5)
POTASSIUM SERPL-SCNC: 3.9 MMOL/L (ref 3.7–5.3)
RBC # BLD: 5.08 M/UL (ref 4.21–5.77)
RBC # BLD: ABNORMAL 10*6/UL
REASON FOR REJECTION: NORMAL
SARS-COV-2, PCR: NORMAL
SARS-COV-2, RAPID: NOT DETECTED
SARS-COV-2: NORMAL
SEG NEUTROPHILS: 65 % (ref 36–65)
SEGMENTED NEUTROPHILS ABSOLUTE COUNT: 8.71 K/UL (ref 1.5–8.1)
SODIUM BLD-SCNC: 140 MMOL/L (ref 135–144)
SOURCE: NORMAL
TOTAL PROTEIN: 6.2 G/DL (ref 6.4–8.3)
WBC # BLD: 13.5 K/UL (ref 3.5–11.3)
WBC # BLD: ABNORMAL 10*3/UL
ZZ NTE CLEAN UP: ORDERED TEST: NORMAL
ZZ NTE WITH NAME CLEAN UP: SPECIMEN SOURCE: NORMAL

## 2020-06-12 PROCEDURE — 6360000002 HC RX W HCPCS

## 2020-06-12 PROCEDURE — 0WJP0ZZ INSPECTION OF GASTROINTESTINAL TRACT, OPEN APPROACH: ICD-10-PCS | Performed by: COLON & RECTAL SURGERY

## 2020-06-12 PROCEDURE — 2580000003 HC RX 258: Performed by: NURSE PRACTITIONER

## 2020-06-12 PROCEDURE — 87075 CULTR BACTERIA EXCEPT BLOOD: CPT

## 2020-06-12 PROCEDURE — 2720000010 HC SURG SUPPLY STERILE: Performed by: COLON & RECTAL SURGERY

## 2020-06-12 PROCEDURE — 87070 CULTURE OTHR SPECIMN AEROBIC: CPT

## 2020-06-12 PROCEDURE — 87076 CULTURE ANAEROBE IDENT EACH: CPT

## 2020-06-12 PROCEDURE — 2580000003 HC RX 258: Performed by: STUDENT IN AN ORGANIZED HEALTH CARE EDUCATION/TRAINING PROGRAM

## 2020-06-12 PROCEDURE — 2709999900 HC NON-CHARGEABLE SUPPLY: Performed by: COLON & RECTAL SURGERY

## 2020-06-12 PROCEDURE — 6360000002 HC RX W HCPCS: Performed by: ANESTHESIOLOGY

## 2020-06-12 PROCEDURE — 7100000001 HC PACU RECOVERY - ADDTL 15 MIN: Performed by: COLON & RECTAL SURGERY

## 2020-06-12 PROCEDURE — 0D1B0Z4 BYPASS ILEUM TO CUTANEOUS, OPEN APPROACH: ICD-10-PCS | Performed by: COLON & RECTAL SURGERY

## 2020-06-12 PROCEDURE — 6360000002 HC RX W HCPCS: Performed by: EMERGENCY MEDICINE

## 2020-06-12 PROCEDURE — 87185 SC STD ENZYME DETCJ PER NZM: CPT

## 2020-06-12 PROCEDURE — 6360000004 HC RX CONTRAST MEDICATION: Performed by: EMERGENCY MEDICINE

## 2020-06-12 PROCEDURE — 3600000002 HC SURGERY LEVEL 2 BASE: Performed by: COLON & RECTAL SURGERY

## 2020-06-12 PROCEDURE — 85025 COMPLETE CBC W/AUTO DIFF WBC: CPT

## 2020-06-12 PROCEDURE — 3700000000 HC ANESTHESIA ATTENDED CARE: Performed by: COLON & RECTAL SURGERY

## 2020-06-12 PROCEDURE — 86900 BLOOD TYPING SEROLOGIC ABO: CPT

## 2020-06-12 PROCEDURE — 80053 COMPREHEN METABOLIC PANEL: CPT

## 2020-06-12 PROCEDURE — 82947 ASSAY GLUCOSE BLOOD QUANT: CPT

## 2020-06-12 PROCEDURE — 87205 SMEAR GRAM STAIN: CPT

## 2020-06-12 PROCEDURE — 2580000003 HC RX 258: Performed by: ANESTHESIOLOGY

## 2020-06-12 PROCEDURE — 7100000000 HC PACU RECOVERY - FIRST 15 MIN: Performed by: COLON & RECTAL SURGERY

## 2020-06-12 PROCEDURE — 96375 TX/PRO/DX INJ NEW DRUG ADDON: CPT

## 2020-06-12 PROCEDURE — 74177 CT ABD & PELVIS W/CONTRAST: CPT

## 2020-06-12 PROCEDURE — 2500000003 HC RX 250 WO HCPCS: Performed by: ANESTHESIOLOGY

## 2020-06-12 PROCEDURE — 86901 BLOOD TYPING SEROLOGIC RH(D): CPT

## 2020-06-12 PROCEDURE — 0DNK0ZZ RELEASE ASCENDING COLON, OPEN APPROACH: ICD-10-PCS | Performed by: COLON & RECTAL SURGERY

## 2020-06-12 PROCEDURE — 2000000000 HC ICU R&B

## 2020-06-12 PROCEDURE — 96365 THER/PROPH/DIAG IV INF INIT: CPT

## 2020-06-12 PROCEDURE — 81003 URINALYSIS AUTO W/O SCOPE: CPT

## 2020-06-12 PROCEDURE — U0002 COVID-19 LAB TEST NON-CDC: HCPCS

## 2020-06-12 PROCEDURE — 3600000012 HC SURGERY LEVEL 2 ADDTL 15MIN: Performed by: COLON & RECTAL SURGERY

## 2020-06-12 PROCEDURE — 2580000003 HC RX 258: Performed by: EMERGENCY MEDICINE

## 2020-06-12 PROCEDURE — 86850 RBC ANTIBODY SCREEN: CPT

## 2020-06-12 PROCEDURE — 83690 ASSAY OF LIPASE: CPT

## 2020-06-12 PROCEDURE — 99285 EMERGENCY DEPT VISIT HI MDM: CPT

## 2020-06-12 PROCEDURE — 0WQF0ZZ REPAIR ABDOMINAL WALL, OPEN APPROACH: ICD-10-PCS | Performed by: COLON & RECTAL SURGERY

## 2020-06-12 PROCEDURE — 86403 PARTICLE AGGLUT ANTBDY SCRN: CPT

## 2020-06-12 PROCEDURE — 3700000001 HC ADD 15 MINUTES (ANESTHESIA): Performed by: COLON & RECTAL SURGERY

## 2020-06-12 RX ORDER — SUCCINYLCHOLINE/SOD CL,ISO/PF 100 MG/5ML
SYRINGE (ML) INTRAVENOUS PRN
Status: DISCONTINUED | OUTPATIENT
Start: 2020-06-12 | End: 2020-06-12 | Stop reason: SDUPTHER

## 2020-06-12 RX ORDER — HYDROMORPHONE HCL 110MG/55ML
0.25 PATIENT CONTROLLED ANALGESIA SYRINGE INTRAVENOUS EVERY 5 MIN PRN
Status: COMPLETED | OUTPATIENT
Start: 2020-06-12 | End: 2020-06-12

## 2020-06-12 RX ORDER — MORPHINE SULFATE 2 MG/ML
2 INJECTION, SOLUTION INTRAMUSCULAR; INTRAVENOUS
Status: DISCONTINUED | OUTPATIENT
Start: 2020-06-12 | End: 2020-06-12

## 2020-06-12 RX ORDER — CLONAZEPAM 0.5 MG/1
0.5 TABLET ORAL 2 TIMES DAILY PRN
Status: DISCONTINUED | OUTPATIENT
Start: 2020-06-12 | End: 2020-06-13

## 2020-06-12 RX ORDER — ONDANSETRON 2 MG/ML
4 INJECTION INTRAMUSCULAR; INTRAVENOUS ONCE
Status: COMPLETED | OUTPATIENT
Start: 2020-06-12 | End: 2020-06-12

## 2020-06-12 RX ORDER — ONDANSETRON 2 MG/ML
4 INJECTION INTRAMUSCULAR; INTRAVENOUS EVERY 6 HOURS PRN
Status: DISCONTINUED | OUTPATIENT
Start: 2020-06-12 | End: 2020-06-18 | Stop reason: HOSPADM

## 2020-06-12 RX ORDER — HYDROMORPHONE HYDROCHLORIDE 1 MG/ML
0.25 INJECTION, SOLUTION INTRAMUSCULAR; INTRAVENOUS; SUBCUTANEOUS EVERY 5 MIN PRN
Status: DISCONTINUED | OUTPATIENT
Start: 2020-06-12 | End: 2020-06-12 | Stop reason: HOSPADM

## 2020-06-12 RX ORDER — FENTANYL CITRATE 50 UG/ML
25 INJECTION, SOLUTION INTRAMUSCULAR; INTRAVENOUS EVERY 5 MIN PRN
Status: DISCONTINUED | OUTPATIENT
Start: 2020-06-12 | End: 2020-06-12 | Stop reason: HOSPADM

## 2020-06-12 RX ORDER — ONDANSETRON 2 MG/ML
4 INJECTION INTRAMUSCULAR; INTRAVENOUS
Status: DISCONTINUED | OUTPATIENT
Start: 2020-06-12 | End: 2020-06-12 | Stop reason: HOSPADM

## 2020-06-12 RX ORDER — DEXAMETHASONE SODIUM PHOSPHATE 10 MG/ML
INJECTION INTRAMUSCULAR; INTRAVENOUS PRN
Status: DISCONTINUED | OUTPATIENT
Start: 2020-06-12 | End: 2020-06-12 | Stop reason: SDUPTHER

## 2020-06-12 RX ORDER — MORPHINE SULFATE 2 MG/ML
2 INJECTION, SOLUTION INTRAMUSCULAR; INTRAVENOUS
Status: DISCONTINUED | OUTPATIENT
Start: 2020-06-12 | End: 2020-06-13

## 2020-06-12 RX ORDER — POLYETHYLENE GLYCOL 3350 17 G/17G
17 POWDER, FOR SOLUTION ORAL DAILY PRN
Status: DISCONTINUED | OUTPATIENT
Start: 2020-06-12 | End: 2020-06-12

## 2020-06-12 RX ORDER — ROCURONIUM BROMIDE 10 MG/ML
INJECTION, SOLUTION INTRAVENOUS PRN
Status: DISCONTINUED | OUTPATIENT
Start: 2020-06-12 | End: 2020-06-12 | Stop reason: SDUPTHER

## 2020-06-12 RX ORDER — ONDANSETRON 2 MG/ML
INJECTION INTRAMUSCULAR; INTRAVENOUS PRN
Status: DISCONTINUED | OUTPATIENT
Start: 2020-06-12 | End: 2020-06-12 | Stop reason: SDUPTHER

## 2020-06-12 RX ORDER — ACETAMINOPHEN 325 MG/1
650 TABLET ORAL EVERY 6 HOURS PRN
Status: DISCONTINUED | OUTPATIENT
Start: 2020-06-12 | End: 2020-06-12

## 2020-06-12 RX ORDER — SODIUM CHLORIDE 0.9 % (FLUSH) 0.9 %
10 SYRINGE (ML) INJECTION EVERY 12 HOURS SCHEDULED
Status: DISCONTINUED | OUTPATIENT
Start: 2020-06-13 | End: 2020-06-18 | Stop reason: HOSPADM

## 2020-06-12 RX ORDER — HYDROMORPHONE HCL 110MG/55ML
PATIENT CONTROLLED ANALGESIA SYRINGE INTRAVENOUS PRN
Status: DISCONTINUED | OUTPATIENT
Start: 2020-06-12 | End: 2020-06-12 | Stop reason: SDUPTHER

## 2020-06-12 RX ORDER — SODIUM CHLORIDE 9 MG/ML
INJECTION, SOLUTION INTRAVENOUS CONTINUOUS
Status: DISCONTINUED | OUTPATIENT
Start: 2020-06-12 | End: 2020-06-12

## 2020-06-12 RX ORDER — POTASSIUM CHLORIDE 7.45 MG/ML
10 INJECTION INTRAVENOUS PRN
Status: DISCONTINUED | OUTPATIENT
Start: 2020-06-12 | End: 2020-06-18 | Stop reason: HOSPADM

## 2020-06-12 RX ORDER — HYDROMORPHONE HCL 110MG/55ML
PATIENT CONTROLLED ANALGESIA SYRINGE INTRAVENOUS
Status: COMPLETED
Start: 2020-06-12 | End: 2020-06-12

## 2020-06-12 RX ORDER — SODIUM CHLORIDE 0.9 % (FLUSH) 0.9 %
10 SYRINGE (ML) INJECTION PRN
Status: DISCONTINUED | OUTPATIENT
Start: 2020-06-12 | End: 2020-06-18 | Stop reason: HOSPADM

## 2020-06-12 RX ORDER — MORPHINE SULFATE 4 MG/ML
4 INJECTION, SOLUTION INTRAMUSCULAR; INTRAVENOUS ONCE
Status: COMPLETED | OUTPATIENT
Start: 2020-06-12 | End: 2020-06-12

## 2020-06-12 RX ORDER — AMIODARONE HYDROCHLORIDE 200 MG/1
200 TABLET ORAL DAILY
Status: DISCONTINUED | OUTPATIENT
Start: 2020-06-13 | End: 2020-06-18 | Stop reason: HOSPADM

## 2020-06-12 RX ORDER — FENTANYL CITRATE 50 UG/ML
25 INJECTION, SOLUTION INTRAMUSCULAR; INTRAVENOUS EVERY 5 MIN PRN
Status: COMPLETED | OUTPATIENT
Start: 2020-06-12 | End: 2020-06-12

## 2020-06-12 RX ORDER — LIDOCAINE HYDROCHLORIDE 20 MG/ML
INJECTION, SOLUTION EPIDURAL; INFILTRATION; INTRACAUDAL; PERINEURAL PRN
Status: DISCONTINUED | OUTPATIENT
Start: 2020-06-12 | End: 2020-06-12 | Stop reason: SDUPTHER

## 2020-06-12 RX ORDER — SODIUM CHLORIDE, SODIUM LACTATE, POTASSIUM CHLORIDE, CALCIUM CHLORIDE 600; 310; 30; 20 MG/100ML; MG/100ML; MG/100ML; MG/100ML
INJECTION, SOLUTION INTRAVENOUS CONTINUOUS PRN
Status: DISCONTINUED | OUTPATIENT
Start: 2020-06-12 | End: 2020-06-12 | Stop reason: SDUPTHER

## 2020-06-12 RX ORDER — ACETAMINOPHEN 650 MG/1
650 SUPPOSITORY RECTAL EVERY 6 HOURS PRN
Status: DISCONTINUED | OUTPATIENT
Start: 2020-06-12 | End: 2020-06-12

## 2020-06-12 RX ORDER — NICOTINE 21 MG/24HR
1 PATCH, TRANSDERMAL 24 HOURS TRANSDERMAL DAILY PRN
Status: DISCONTINUED | OUTPATIENT
Start: 2020-06-12 | End: 2020-06-12

## 2020-06-12 RX ORDER — MORPHINE SULFATE 4 MG/ML
4 INJECTION, SOLUTION INTRAMUSCULAR; INTRAVENOUS
Status: DISCONTINUED | OUTPATIENT
Start: 2020-06-12 | End: 2020-06-13

## 2020-06-12 RX ORDER — MAGNESIUM SULFATE 1 G/100ML
1 INJECTION INTRAVENOUS PRN
Status: DISCONTINUED | OUTPATIENT
Start: 2020-06-12 | End: 2020-06-18 | Stop reason: HOSPADM

## 2020-06-12 RX ORDER — GLYCOPYRROLATE 1 MG/5 ML
SYRINGE (ML) INTRAVENOUS PRN
Status: DISCONTINUED | OUTPATIENT
Start: 2020-06-12 | End: 2020-06-12 | Stop reason: SDUPTHER

## 2020-06-12 RX ORDER — SODIUM CHLORIDE, SODIUM LACTATE, POTASSIUM CHLORIDE, CALCIUM CHLORIDE 600; 310; 30; 20 MG/100ML; MG/100ML; MG/100ML; MG/100ML
INJECTION, SOLUTION INTRAVENOUS CONTINUOUS
Status: DISCONTINUED | OUTPATIENT
Start: 2020-06-13 | End: 2020-06-18

## 2020-06-12 RX ORDER — 0.9 % SODIUM CHLORIDE 0.9 %
80 INTRAVENOUS SOLUTION INTRAVENOUS ONCE
Status: COMPLETED | OUTPATIENT
Start: 2020-06-12 | End: 2020-06-12

## 2020-06-12 RX ORDER — SODIUM CHLORIDE 0.9 % (FLUSH) 0.9 %
10 SYRINGE (ML) INJECTION PRN
Status: DISCONTINUED | OUTPATIENT
Start: 2020-06-12 | End: 2020-06-12 | Stop reason: SDUPTHER

## 2020-06-12 RX ORDER — POTASSIUM CHLORIDE 20 MEQ/1
40 TABLET, EXTENDED RELEASE ORAL PRN
Status: DISCONTINUED | OUTPATIENT
Start: 2020-06-12 | End: 2020-06-18 | Stop reason: HOSPADM

## 2020-06-12 RX ORDER — PROPOFOL 10 MG/ML
INJECTION, EMULSION INTRAVENOUS PRN
Status: DISCONTINUED | OUTPATIENT
Start: 2020-06-12 | End: 2020-06-12 | Stop reason: SDUPTHER

## 2020-06-12 RX ORDER — NEOSTIGMINE METHYLSULFATE 5 MG/5 ML
SYRINGE (ML) INTRAVENOUS PRN
Status: DISCONTINUED | OUTPATIENT
Start: 2020-06-12 | End: 2020-06-12 | Stop reason: SDUPTHER

## 2020-06-12 RX ORDER — SODIUM CHLORIDE 0.9 % (FLUSH) 0.9 %
10 SYRINGE (ML) INJECTION EVERY 12 HOURS SCHEDULED
Status: DISCONTINUED | OUTPATIENT
Start: 2020-06-12 | End: 2020-06-18 | Stop reason: HOSPADM

## 2020-06-12 RX ADMIN — PIPERACILLIN SODIUM AND TAZOBACTAM SODIUM 4.5 G: 4; .5 INJECTION, POWDER, LYOPHILIZED, FOR SOLUTION INTRAVENOUS at 16:17

## 2020-06-12 RX ADMIN — HYDROMORPHONE HYDROCHLORIDE 0.5 MG: 2 INJECTION INTRAMUSCULAR; INTRAVENOUS; SUBCUTANEOUS at 20:50

## 2020-06-12 RX ADMIN — ONDANSETRON 4 MG: 2 INJECTION INTRAMUSCULAR; INTRAVENOUS at 21:55

## 2020-06-12 RX ADMIN — MORPHINE SULFATE 4 MG: 4 INJECTION, SOLUTION INTRAMUSCULAR; INTRAVENOUS at 15:56

## 2020-06-12 RX ADMIN — ONDANSETRON 4 MG: 2 INJECTION INTRAMUSCULAR; INTRAVENOUS at 15:46

## 2020-06-12 RX ADMIN — IOPAMIDOL 75 ML: 755 INJECTION, SOLUTION INTRAVENOUS at 15:11

## 2020-06-12 RX ADMIN — Medication 25 MCG: at 20:23

## 2020-06-12 RX ADMIN — SODIUM CHLORIDE 80 ML: 9 INJECTION, SOLUTION INTRAVENOUS at 15:11

## 2020-06-12 RX ADMIN — HYDROMORPHONE HYDROCHLORIDE 0.25 MG: 2 INJECTION INTRAMUSCULAR; INTRAVENOUS; SUBCUTANEOUS at 21:41

## 2020-06-12 RX ADMIN — SODIUM CHLORIDE, POTASSIUM CHLORIDE, SODIUM LACTATE AND CALCIUM CHLORIDE: 600; 310; 30; 20 INJECTION, SOLUTION INTRAVENOUS at 19:46

## 2020-06-12 RX ADMIN — HYDROMORPHONE HYDROCHLORIDE 0.25 MG: 2 INJECTION INTRAMUSCULAR; INTRAVENOUS; SUBCUTANEOUS at 20:59

## 2020-06-12 RX ADMIN — ROCURONIUM BROMIDE 20 MG: 50 INJECTION INTRAVENOUS at 20:51

## 2020-06-12 RX ADMIN — HYDROMORPHONE HYDROCHLORIDE 0.25 MG: 2 INJECTION, SOLUTION INTRAMUSCULAR; INTRAVENOUS; SUBCUTANEOUS at 23:21

## 2020-06-12 RX ADMIN — ROCURONIUM BROMIDE 50 MG: 50 INJECTION INTRAVENOUS at 20:06

## 2020-06-12 RX ADMIN — HYDROMORPHONE HYDROCHLORIDE 0.25 MG: 2 INJECTION INTRAMUSCULAR; INTRAVENOUS; SUBCUTANEOUS at 22:46

## 2020-06-12 RX ADMIN — Medication 10 ML: at 15:11

## 2020-06-12 RX ADMIN — Medication 4.2 MG: at 22:30

## 2020-06-12 RX ADMIN — LIDOCAINE HYDROCHLORIDE 5 ML: 20 INJECTION, SOLUTION EPIDURAL; INFILTRATION; INTRACAUDAL; PERINEURAL at 19:54

## 2020-06-12 RX ADMIN — DEXAMETHASONE SODIUM PHOSPHATE 10 MG: 10 INJECTION INTRAMUSCULAR; INTRAVENOUS at 20:07

## 2020-06-12 RX ADMIN — Medication 25 MCG: at 19:54

## 2020-06-12 RX ADMIN — Medication 0.8 MG: at 22:29

## 2020-06-12 RX ADMIN — HYDROMORPHONE HYDROCHLORIDE 0.25 MG: 2 INJECTION, SOLUTION INTRAMUSCULAR; INTRAVENOUS; SUBCUTANEOUS at 23:10

## 2020-06-12 RX ADMIN — SODIUM CHLORIDE: 9 INJECTION, SOLUTION INTRAVENOUS at 17:07

## 2020-06-12 RX ADMIN — HYDROMORPHONE HYDROCHLORIDE 0.25 MG: 2 INJECTION, SOLUTION INTRAMUSCULAR; INTRAVENOUS; SUBCUTANEOUS at 23:33

## 2020-06-12 RX ADMIN — SODIUM CHLORIDE, POTASSIUM CHLORIDE, SODIUM LACTATE AND CALCIUM CHLORIDE: 600; 310; 30; 20 INJECTION, SOLUTION INTRAVENOUS at 23:59

## 2020-06-12 RX ADMIN — PROPOFOL 150 MG: 10 INJECTION, EMULSION INTRAVENOUS at 19:54

## 2020-06-12 RX ADMIN — HYDROMORPHONE HYDROCHLORIDE 0.25 MG: 2 INJECTION, SOLUTION INTRAMUSCULAR; INTRAVENOUS; SUBCUTANEOUS at 23:16

## 2020-06-12 RX ADMIN — Medication 25 MCG: at 20:11

## 2020-06-12 RX ADMIN — Medication 100 MG: at 19:54

## 2020-06-12 RX ADMIN — Medication 25 MCG: at 20:44

## 2020-06-12 ASSESSMENT — PULMONARY FUNCTION TESTS
PIF_VALUE: 20
PIF_VALUE: 21
PIF_VALUE: 15
PIF_VALUE: 2
PIF_VALUE: 16
PIF_VALUE: 21
PIF_VALUE: 20
PIF_VALUE: 19
PIF_VALUE: 19
PIF_VALUE: 20
PIF_VALUE: 19
PIF_VALUE: 20
PIF_VALUE: 19
PIF_VALUE: 20
PIF_VALUE: 18
PIF_VALUE: 20
PIF_VALUE: 17
PIF_VALUE: 20
PIF_VALUE: 15
PIF_VALUE: 19
PIF_VALUE: 20
PIF_VALUE: 17
PIF_VALUE: 19
PIF_VALUE: 20
PIF_VALUE: 26
PIF_VALUE: 6
PIF_VALUE: 19
PIF_VALUE: 20
PIF_VALUE: 19
PIF_VALUE: 20
PIF_VALUE: 19
PIF_VALUE: 16
PIF_VALUE: 15
PIF_VALUE: 18
PIF_VALUE: 20
PIF_VALUE: 19
PIF_VALUE: 15
PIF_VALUE: 16
PIF_VALUE: 20
PIF_VALUE: 20
PIF_VALUE: 19
PIF_VALUE: 0
PIF_VALUE: 19
PIF_VALUE: 20
PIF_VALUE: 21
PIF_VALUE: 16
PIF_VALUE: 19
PIF_VALUE: 19
PIF_VALUE: 20
PIF_VALUE: 20
PIF_VALUE: 14
PIF_VALUE: 20
PIF_VALUE: 19
PIF_VALUE: 20
PIF_VALUE: 19
PIF_VALUE: 16
PIF_VALUE: 1
PIF_VALUE: 19
PIF_VALUE: 20
PIF_VALUE: 20
PIF_VALUE: 21
PIF_VALUE: 20
PIF_VALUE: 15
PIF_VALUE: 19
PIF_VALUE: 20
PIF_VALUE: 20
PIF_VALUE: 15
PIF_VALUE: 16
PIF_VALUE: 20
PIF_VALUE: 19
PIF_VALUE: 20
PIF_VALUE: 19
PIF_VALUE: 20
PIF_VALUE: 20
PIF_VALUE: 16
PIF_VALUE: 20
PIF_VALUE: 19
PIF_VALUE: 20
PIF_VALUE: 15
PIF_VALUE: 0
PIF_VALUE: 21
PIF_VALUE: 21
PIF_VALUE: 6
PIF_VALUE: 19
PIF_VALUE: 16
PIF_VALUE: 19
PIF_VALUE: 20
PIF_VALUE: 19
PIF_VALUE: 19
PIF_VALUE: 32
PIF_VALUE: 18
PIF_VALUE: 17
PIF_VALUE: 19
PIF_VALUE: 20
PIF_VALUE: 18
PIF_VALUE: 20
PIF_VALUE: 19
PIF_VALUE: 31
PIF_VALUE: 17
PIF_VALUE: 20
PIF_VALUE: 0
PIF_VALUE: 20
PIF_VALUE: 16
PIF_VALUE: 21
PIF_VALUE: 21
PIF_VALUE: 20
PIF_VALUE: 20
PIF_VALUE: 16
PIF_VALUE: 19
PIF_VALUE: 1
PIF_VALUE: 20
PIF_VALUE: 17
PIF_VALUE: 20
PIF_VALUE: 19
PIF_VALUE: 20
PIF_VALUE: 17
PIF_VALUE: 20
PIF_VALUE: 19
PIF_VALUE: 19
PIF_VALUE: 20
PIF_VALUE: 1
PIF_VALUE: 20
PIF_VALUE: 15
PIF_VALUE: 19
PIF_VALUE: 19
PIF_VALUE: 20
PIF_VALUE: 15
PIF_VALUE: 20
PIF_VALUE: 19
PIF_VALUE: 20
PIF_VALUE: 17
PIF_VALUE: 21
PIF_VALUE: 20
PIF_VALUE: 19
PIF_VALUE: 20
PIF_VALUE: 19
PIF_VALUE: 20
PIF_VALUE: 16
PIF_VALUE: 19
PIF_VALUE: 20
PIF_VALUE: 20
PIF_VALUE: 4
PIF_VALUE: 20
PIF_VALUE: 2
PIF_VALUE: 17
PIF_VALUE: 20
PIF_VALUE: 19
PIF_VALUE: 20
PIF_VALUE: 15
PIF_VALUE: 18
PIF_VALUE: 19
PIF_VALUE: 21
PIF_VALUE: 4
PIF_VALUE: 19
PIF_VALUE: 16
PIF_VALUE: 20
PIF_VALUE: 19

## 2020-06-12 ASSESSMENT — PAIN SCALES - GENERAL
PAINLEVEL_OUTOF10: 10
PAINLEVEL_OUTOF10: 8
PAINLEVEL_OUTOF10: 10
PAINLEVEL_OUTOF10: 2

## 2020-06-12 ASSESSMENT — PAIN DESCRIPTION - DESCRIPTORS
DESCRIPTORS: CRAMPING
DESCRIPTORS: PATIENT UNABLE TO DESCRIBE

## 2020-06-12 ASSESSMENT — PAIN DESCRIPTION - LOCATION
LOCATION: ABDOMEN
LOCATION: ABDOMEN

## 2020-06-12 ASSESSMENT — LIFESTYLE VARIABLES: SMOKING_STATUS: 1

## 2020-06-12 NOTE — ED NOTES
Patient waiting for ct report and further evaluation by doctor willis.       Mary Savage RN  06/12/20 0702

## 2020-06-12 NOTE — ED NOTES
Patient brought into ec by private auto . advised  That he had surgery on is colon acouple weeks ago and was released from the hospital around 1 week later. Patient  Experienced mild fullness to abd area and slight abd distention. Vernestine Ket discomfort  increased today  And patient was evaluated by his pcp today and had abd x-rays done. Was advised by his pcp that he had  A gas pattern on x-ray and referred to the ec for further evaluation patient states tht his last bowel movement was around 1 hour prior to arrival.  Patient is alert/oriented . respirations non labored. Patient continues to experienced increased pressure  Discomfort to abd area. Last meal around 0830 today.       Adriel Harmon RN  06/12/20 5211

## 2020-06-12 NOTE — ANESTHESIA PRE PROCEDURE
Dose Route Frequency Provider Last Rate Last Dose    clonazePAM (KLONOPIN) tablet 0.5 mg  0.5 mg Oral BID PRN Gearldean Art, APRN - NP       Kylah Jeronimo [START ON 6/13/2020] amiodarone (CORDARONE) tablet 200 mg  200 mg Oral Daily Surekha Ermelinda, APRN - NP        sodium chloride flush 0.9 % injection 10 mL  10 mL Intravenous 2 times per day Gearldean Art, APRN - NP        sodium chloride flush 0.9 % injection 10 mL  10 mL Intravenous PRN Surekha Ermelinda, APRN - NP        potassium chloride (KLOR-CON M) extended release tablet 40 mEq  40 mEq Oral PRN Surekha Ermelinda, APRN - NP        Or    potassium bicarb-citric acid (EFFER-K) effervescent tablet 40 mEq  40 mEq Oral PRN Surekha Ermelinda, APRN - NP        Or    potassium chloride 10 mEq/100 mL IVPB (Peripheral Line)  10 mEq Intravenous PRN Surekha Ermelinda, APRN - NP        magnesium sulfate 1 g in dextrose 5% 100 mL IVPB  1 g Intravenous PRN Surekha Ermelinda, APRN - NP        acetaminophen (TYLENOL) tablet 650 mg  650 mg Oral Q6H PRN Surekha Ermelinda, APRN - NP        Or    acetaminophen (TYLENOL) suppository 650 mg  650 mg Rectal Q6H PRN Surekha Ermelinda, APRN - NP        polyethylene glycol (GLYCOLAX) packet 17 g  17 g Oral Daily PRN Surekha Ermelinda, APRN - NP        famotidine (PEPCID) injection 20 mg  20 mg Intravenous BID Surekha Ermelinda, APRN - NP        nicotine (NICODERM CQ) 21 MG/24HR 1 patch  1 patch Transdermal Daily PRN Surekha Ermelinda, APRN - NP        0.9 % sodium chloride infusion   Intravenous Continuous Surekha Ermelinda, APRN -  mL/hr at 06/12/20 1707      ondansetron (ZOFRAN) injection 4 mg  4 mg Intravenous Q6H PRN Surekha Ermelinda, APRN - NP        morphine (PF) injection 2 mg  2 mg Intravenous Q3H PRN Surekha Ermelinda, APRN - NP        piperacillin-tazobactam (ZOSYN) 3.375 g in dextrose 5 % 50 mL IVPB extended infusion (mini-bag)  3.375 g Intravenous Q8H Surekha Ermelinda, APRN - NP           Allergies:     Allergies malignancy/cancer. Abdominal:           Vascular:                                          Anesthesia Plan      general     ASA 3 - emergent     (Rsi)  Induction: intravenous. MIPS: Postoperative opioids intended and Prophylactic antiemetics administered. Anesthetic plan and risks discussed with patient. Use of blood products discussed with patient whom consented to blood products.    Plan discussed with attending and surgical team.    Attending anesthesiologist reviewed and agrees with Joni Davalos MD   6/12/2020

## 2020-06-12 NOTE — CONSULTS
Colorectal Surgery   Consultation        PATIENT NAME: Cherise Grier   YOB: 1950  MRN: [id number]    ADMISSION DATE: 6/12/2020  1:06 PM     Consulted Physician: Dr. Elijah Villa DATE: 6/13/2020    Consult Regarding:  Pneumoperitoneum s/p transverse colectomy/anastomosis 2 weeks prior    Cc: abdominal pain    HISTORY OF PRESENT ILLNESS:  The patient is a 69 y.o. male  who presents to [address] [place], [place] [address] ER with complaints of abdominal pain. The patient was recently in the hospital after undergoing a transverse colectomy with ascending-descending colon anastomosis per Dr. Irvin Kim on 5/28/2020. The patient was ultimately discharged home by 6/3/2020 and stated that at first was doing well but then began to have worsening pain, more focal towards the right as well as belching that tasted of stool. Ultimately he went to the ER for evaluation and CT Abdomen pelvis was performed which demonstrated findings of pneumoperitoneum.  Given length of time since surgery and amount of free air, decision was made to proceed to the OR emergently for exploration and admission post operatively with assistance of the medical team.    Past Medical History:        Diagnosis Date    Anxiety     Cancer ([facility] [address]     basal cell forehead    [place] (hard of hearing)     Hx of cold sores     IBS (irritable bowel syndrome)     Kidney stone     Neuropathy     PTSD (post-traumatic stress disorder)     Wears glasses        Past Surgical History:        Procedure Laterality Date    COLON SURGERY      COLONOSCOPY      march 2020    COSMETIC SURGERY      forehead scar revision    HEMICOLECTOMY N/A 5/28/2020    ABDOMINAL EXPLORATION WITH EXCISION OF TRANSVERSE COLON/RIGHT COLECTOMY performed by Malena Saenz MD at [place] [address].         Medications Prior to Admission:   Medications Prior to Admission: pantoprazole (PROTONIX) 40 MG tablet, Take 40 mg by mouth daily  oxyCODONE (ROXICODONE) 5 MG immediate release tablet, Take 5 mg by

## 2020-06-12 NOTE — ED PROVIDER NOTES
EMERGENCY DEPARTMENT ENCOUNTER    Pt Name: Cornelio Chavez  MRN: [id number]  [place] 1950  Date of evaluation: 6/12/20  CHIEF COMPLAINT       Chief Complaint   Patient presents with    Abdominal Pain    Bloated     HISTORY OF PRESENT ILLNESS   Patient is a 48-year-old male is post elective colorectal surgery 15 days ago with excision of transverse colon who presents to the ED complaining of abdominal bloating. Pain started this morning after waking up from sleep. He feels gassy, like he needs to burp however symptoms will not resolve. He does not have fevers, vomiting, diarrhea. He had normal bowel movement today. Also no cough, shortness of breath, nausea, vomiting, changes in urine. REVIEW OF SYSTEMS     Review of Systems   All other systems reviewed and are negative. PASTMEDICAL HISTORY     Past Medical History:   Diagnosis Date    Anxiety     Cancer ([address]     basal cell forehead    [place] (hard of hearing)     Hx of cold sores     IBS (irritable bowel syndrome)     Kidney stone     Neuropathy     PTSD (post-traumatic stress disorder)     Wears glasses      SURGICAL HISTORY       Past Surgical History:   Procedure Laterality Date    COLON SURGERY      COLONOSCOPY      march 2020    COSMETIC SURGERY      forehead scar revision    HEMICOLECTOMY N/A 5/28/2020    ABDOMINAL EXPLORATION WITH EXCISION OF TRANSVERSE COLON/RIGHT COLECTOMY performed by Candelaria Cadena MD at [address]       Previous Medications    ACETAMINOPHEN (TYLENOL) 500 MG TABLET    Take 2 tablets by mouth every 8 hours as needed for Pain or Fever    AMIODARONE (CORDARONE) 200 MG TABLET    Take 1 tablet by mouth daily    ASPIRIN 81 MG CHEWABLE TABLET    Take 81 mg by mouth daily    CLONAZEPAM (KLONOPIN) 0.5 MG TABLET    Take 0.5 mg by mouth 2 times daily as needed.      IBUPROFEN (ADVIL;MOTRIN) 600 MG TABLET    Take 0.5 tablets by mouth every 6 hours as needed for Pain    MULTIPLE Jackie Vega [place]. [WILLIAM]   1129 CT scan shows:  Large volume of pneumoperitoneum, more than expected for two weeks postoperative from hemicolectomy. Findings are concerning for anastomotic  leak. [WILLIAM]   56 I discussed case with colorectal surgeon, Dr. Sukhdev Moreland, who advises antibiotics and admit. [WILLIAM]   6917 I discussed case with Hospitalist team, Surekha, who accepts patient for admission to hospital.    [WILLIAM]      ED Course User Index  [WILLIAM] Ciro Albarran MD       DIAGNOSTIC RESULTS   EKG:All EKG's are interpreted by the Emergency Department Physician who either signs or Co-signs this chart in the absence of a cardiologist.        RADIOLOGY:All plain film, CT, MRI, and formal ultrasound images (except ED bedside ultrasound) are read by the radiologist, see reports below, unless otherwisenoted in MDM or here. CT ABDOMEN PELVIS W IV CONTRAST Additional Contrast? None   Preliminary Result   Large volume of pneumoperitoneum, more than expected for two weeks   postoperative from hemicolectomy. Findings are concerning for anastomotic   leak. Ascites. Abdominal aorta measures 2.9 cm and is stable. Gallstones. Critical results were called by Dr. Rodrigo Serrano to [address] on   6/12/2020 at 15:32. LABS: All lab results were reviewed by myself, and all abnormals are listed below.   Labs Reviewed   CBC WITH AUTO DIFFERENTIAL - Abnormal; Notable for the following components:       Result Value    WBC 13.5 (*)     Platelets 745 (*)     Lymphocytes 21 (*)     Eosinophils % 8 (*)     Immature Granulocytes 1 (*)     Segs Absolute 8.71 (*)     Absolute Eos # 1.07 (*)     All other components within normal limits   COMPREHENSIVE METABOLIC PANEL W/ REFLEX TO MG FOR LOW K - Abnormal; Notable for the following components:    Glucose 101 (*)     Total Bilirubin 0.27 (*)     Total Protein 6.2 (*)     Alb 3.0 (*)     All other components within normal limits   LIPASE   SPECIMEN REJECTION Prescriptions    No medications on file     Yazmin Avila MD  Attending Emergency Physician                    Day Siegel MD  06/12/20 9715

## 2020-06-13 PROBLEM — I10 ESSENTIAL HYPERTENSION: Status: ACTIVE | Noted: 2020-06-13

## 2020-06-13 PROBLEM — I48.91 POSTOPERATIVE ATRIAL FIBRILLATION (HCC): Status: ACTIVE | Noted: 2020-06-13

## 2020-06-13 PROBLEM — I97.89 POSTOPERATIVE ATRIAL FIBRILLATION (HCC): Status: ACTIVE | Noted: 2020-06-13

## 2020-06-13 PROBLEM — K66.8 PNEUMOPERITONEUM: Status: ACTIVE | Noted: 2020-06-13

## 2020-06-13 PROBLEM — F17.200 TOBACCO USE DISORDER: Status: ACTIVE | Noted: 2020-06-13

## 2020-06-13 PROBLEM — E43 SEVERE MALNUTRITION (HCC): Status: ACTIVE | Noted: 2020-06-13

## 2020-06-13 PROBLEM — E78.5 OTHER AND UNSPECIFIED HYPERLIPIDEMIA: Status: ACTIVE | Noted: 2020-06-13

## 2020-06-13 LAB
ABSOLUTE EOS #: <0.03 K/UL (ref 0–0.44)
ABSOLUTE IMMATURE GRANULOCYTE: 0.2 K/UL (ref 0–0.3)
ABSOLUTE LYMPH #: 0.87 K/UL (ref 1.1–3.7)
ABSOLUTE MONO #: 0.38 K/UL (ref 0.1–1.2)
ALBUMIN SERPL-MCNC: 3 G/DL (ref 3.5–5.2)
ALBUMIN/GLOBULIN RATIO: ABNORMAL (ref 1–2.5)
ALP BLD-CCNC: 78 U/L (ref 40–129)
ALT SERPL-CCNC: 19 U/L (ref 5–41)
ANION GAP SERPL CALCULATED.3IONS-SCNC: 12 MMOL/L (ref 9–17)
ANION GAP SERPL CALCULATED.3IONS-SCNC: 13 MMOL/L (ref 9–17)
AST SERPL-CCNC: 24 U/L
BASOPHILS # BLD: 0 % (ref 0–2)
BASOPHILS ABSOLUTE: 0.07 K/UL (ref 0–0.2)
BILIRUB SERPL-MCNC: 0.32 MG/DL (ref 0.3–1.2)
BUN BLDV-MCNC: 10 MG/DL (ref 8–23)
BUN BLDV-MCNC: 9 MG/DL (ref 8–23)
BUN/CREAT BLD: 10 (ref 9–20)
BUN/CREAT BLD: 10 (ref 9–20)
CALCIUM IONIZED: 1.24 MMOL/L (ref 1.13–1.33)
CALCIUM IONIZED: 1.25 MMOL/L (ref 1.13–1.33)
CALCIUM SERPL-MCNC: 8.4 MG/DL (ref 8.6–10.4)
CALCIUM SERPL-MCNC: 8.8 MG/DL (ref 8.6–10.4)
CHLORIDE BLD-SCNC: 105 MMOL/L (ref 98–107)
CHLORIDE BLD-SCNC: 106 MMOL/L (ref 98–107)
CO2: 22 MMOL/L (ref 20–31)
CO2: 24 MMOL/L (ref 20–31)
CREAT SERPL-MCNC: 0.89 MG/DL (ref 0.7–1.2)
CREAT SERPL-MCNC: 1 MG/DL (ref 0.7–1.2)
DIFFERENTIAL TYPE: ABNORMAL
EOSINOPHILS RELATIVE PERCENT: 0 % (ref 1–4)
GFR AFRICAN AMERICAN: >60 ML/MIN
GFR AFRICAN AMERICAN: >60 ML/MIN
GFR NON-AFRICAN AMERICAN: >60 ML/MIN
GFR NON-AFRICAN AMERICAN: >60 ML/MIN
GFR SERPL CREATININE-BSD FRML MDRD: ABNORMAL ML/MIN/{1.73_M2}
GLUCOSE BLD-MCNC: 117 MG/DL (ref 75–110)
GLUCOSE BLD-MCNC: 154 MG/DL (ref 70–99)
GLUCOSE BLD-MCNC: 157 MG/DL (ref 70–99)
HCT VFR BLD CALC: 44.5 % (ref 40.7–50.3)
HEMOGLOBIN: 14 G/DL (ref 13–17)
IMMATURE GRANULOCYTES: 1 %
LYMPHOCYTES # BLD: 4 % (ref 24–43)
MAGNESIUM: 1.9 MG/DL (ref 1.6–2.6)
MAGNESIUM: 1.9 MG/DL (ref 1.6–2.6)
MCH RBC QN AUTO: 28.6 PG (ref 25.2–33.5)
MCHC RBC AUTO-ENTMCNC: 31.5 G/DL (ref 28.4–34.8)
MCV RBC AUTO: 91 FL (ref 82.6–102.9)
MONOCYTES # BLD: 2 % (ref 3–12)
NRBC AUTOMATED: 0 PER 100 WBC
PDW BLD-RTO: 13.2 % (ref 11.8–14.4)
PHOSPHORUS: 3.8 MG/DL (ref 2.5–4.5)
PHOSPHORUS: 4.2 MG/DL (ref 2.5–4.5)
PLATELET # BLD: 485 K/UL (ref 138–453)
PLATELET ESTIMATE: ABNORMAL
PMV BLD AUTO: 10.3 FL (ref 8.1–13.5)
POTASSIUM SERPL-SCNC: 4.6 MMOL/L (ref 3.7–5.3)
POTASSIUM SERPL-SCNC: 5 MMOL/L (ref 3.7–5.3)
RBC # BLD: 4.89 M/UL (ref 4.21–5.77)
RBC # BLD: ABNORMAL 10*6/UL
SEG NEUTROPHILS: 92 % (ref 36–65)
SEGMENTED NEUTROPHILS ABSOLUTE COUNT: 18.44 K/UL (ref 1.5–8.1)
SODIUM BLD-SCNC: 140 MMOL/L (ref 135–144)
SODIUM BLD-SCNC: 142 MMOL/L (ref 135–144)
TOTAL PROTEIN: 6 G/DL (ref 6.4–8.3)
WBC # BLD: 20 K/UL (ref 3.5–11.3)
WBC # BLD: ABNORMAL 10*3/UL

## 2020-06-13 PROCEDURE — 80053 COMPREHEN METABOLIC PANEL: CPT

## 2020-06-13 PROCEDURE — 2500000003 HC RX 250 WO HCPCS: Performed by: STUDENT IN AN ORGANIZED HEALTH CARE EDUCATION/TRAINING PROGRAM

## 2020-06-13 PROCEDURE — 6370000000 HC RX 637 (ALT 250 FOR IP): Performed by: STUDENT IN AN ORGANIZED HEALTH CARE EDUCATION/TRAINING PROGRAM

## 2020-06-13 PROCEDURE — 85025 COMPLETE CBC W/AUTO DIFF WBC: CPT

## 2020-06-13 PROCEDURE — 6370000000 HC RX 637 (ALT 250 FOR IP): Performed by: INTERNAL MEDICINE

## 2020-06-13 PROCEDURE — 82947 ASSAY GLUCOSE BLOOD QUANT: CPT

## 2020-06-13 PROCEDURE — 83735 ASSAY OF MAGNESIUM: CPT

## 2020-06-13 PROCEDURE — 6360000002 HC RX W HCPCS: Performed by: STUDENT IN AN ORGANIZED HEALTH CARE EDUCATION/TRAINING PROGRAM

## 2020-06-13 PROCEDURE — 80048 BASIC METABOLIC PNL TOTAL CA: CPT

## 2020-06-13 PROCEDURE — 84100 ASSAY OF PHOSPHORUS: CPT

## 2020-06-13 PROCEDURE — 2580000003 HC RX 258: Performed by: STUDENT IN AN ORGANIZED HEALTH CARE EDUCATION/TRAINING PROGRAM

## 2020-06-13 PROCEDURE — 2700000000 HC OXYGEN THERAPY PER DAY

## 2020-06-13 PROCEDURE — 6360000002 HC RX W HCPCS: Performed by: INTERNAL MEDICINE

## 2020-06-13 PROCEDURE — 82330 ASSAY OF CALCIUM: CPT

## 2020-06-13 PROCEDURE — 36415 COLL VENOUS BLD VENIPUNCTURE: CPT

## 2020-06-13 PROCEDURE — 94770 HC ETCO2 MONITOR DAILY: CPT

## 2020-06-13 PROCEDURE — 99223 1ST HOSP IP/OBS HIGH 75: CPT | Performed by: INTERNAL MEDICINE

## 2020-06-13 PROCEDURE — APPSS60 APP SPLIT SHARED TIME 46-60 MINUTES: Performed by: NURSE PRACTITIONER

## 2020-06-13 PROCEDURE — 2500000003 HC RX 250 WO HCPCS: Performed by: INTERNAL MEDICINE

## 2020-06-13 PROCEDURE — 2000000000 HC ICU R&B

## 2020-06-13 RX ORDER — DEXTROSE MONOHYDRATE 50 MG/ML
100 INJECTION, SOLUTION INTRAVENOUS PRN
Status: DISCONTINUED | OUTPATIENT
Start: 2020-06-13 | End: 2020-06-18 | Stop reason: HOSPADM

## 2020-06-13 RX ORDER — HYDROMORPHONE HYDROCHLORIDE 1 MG/ML
1 INJECTION, SOLUTION INTRAMUSCULAR; INTRAVENOUS; SUBCUTANEOUS ONCE
Status: COMPLETED | OUTPATIENT
Start: 2020-06-13 | End: 2020-06-13

## 2020-06-13 RX ORDER — ACETAMINOPHEN 500 MG
1000 TABLET ORAL EVERY 8 HOURS
Status: DISCONTINUED | OUTPATIENT
Start: 2020-06-13 | End: 2020-06-18 | Stop reason: HOSPADM

## 2020-06-13 RX ORDER — DIAZEPAM 2 MG/1
2 TABLET ORAL EVERY 6 HOURS
Status: DISCONTINUED | OUTPATIENT
Start: 2020-06-13 | End: 2020-06-14

## 2020-06-13 RX ORDER — OXYCODONE HYDROCHLORIDE 5 MG/1
5 TABLET ORAL EVERY 8 HOURS PRN
COMMUNITY
End: 2020-08-07

## 2020-06-13 RX ORDER — DEXTROSE MONOHYDRATE 25 G/50ML
12.5 INJECTION, SOLUTION INTRAVENOUS PRN
Status: DISCONTINUED | OUTPATIENT
Start: 2020-06-13 | End: 2020-06-18 | Stop reason: HOSPADM

## 2020-06-13 RX ORDER — NICOTINE POLACRILEX 4 MG
15 LOZENGE BUCCAL PRN
Status: DISCONTINUED | OUTPATIENT
Start: 2020-06-13 | End: 2020-06-18 | Stop reason: HOSPADM

## 2020-06-13 RX ORDER — NALOXONE HYDROCHLORIDE 0.4 MG/ML
0.4 INJECTION, SOLUTION INTRAMUSCULAR; INTRAVENOUS; SUBCUTANEOUS PRN
Status: DISCONTINUED | OUTPATIENT
Start: 2020-06-13 | End: 2020-06-16

## 2020-06-13 RX ORDER — HYDROMORPHONE HYDROCHLORIDE 1 MG/ML
1 INJECTION, SOLUTION INTRAMUSCULAR; INTRAVENOUS; SUBCUTANEOUS EVERY 4 HOURS PRN
Status: DISCONTINUED | OUTPATIENT
Start: 2020-06-13 | End: 2020-06-13

## 2020-06-13 RX ORDER — PANTOPRAZOLE SODIUM 40 MG/1
40 TABLET, DELAYED RELEASE ORAL DAILY
Status: ON HOLD | COMMUNITY
End: 2020-06-13 | Stop reason: ALTCHOICE

## 2020-06-13 RX ORDER — MORPHINE SULFATE/0.9% NACL/PF 1 MG/ML
SYRINGE (ML) INJECTION CONTINUOUS
Status: DISCONTINUED | OUTPATIENT
Start: 2020-06-13 | End: 2020-06-16

## 2020-06-13 RX ORDER — KETOROLAC TROMETHAMINE 15 MG/ML
15 INJECTION, SOLUTION INTRAMUSCULAR; INTRAVENOUS EVERY 6 HOURS
Status: COMPLETED | OUTPATIENT
Start: 2020-06-13 | End: 2020-06-18

## 2020-06-13 RX ADMIN — FAMOTIDINE 20 MG: 10 INJECTION, SOLUTION INTRAVENOUS at 09:15

## 2020-06-13 RX ADMIN — I.V. FAT EMULSION 250 ML: 20 EMULSION INTRAVENOUS at 18:18

## 2020-06-13 RX ADMIN — MORPHINE SULFATE 4 MG: 4 INJECTION, SOLUTION INTRAMUSCULAR; INTRAVENOUS at 00:19

## 2020-06-13 RX ADMIN — SODIUM CHLORIDE, POTASSIUM CHLORIDE, SODIUM LACTATE AND CALCIUM CHLORIDE: 600; 310; 30; 20 INJECTION, SOLUTION INTRAVENOUS at 02:23

## 2020-06-13 RX ADMIN — SODIUM CHLORIDE, PRESERVATIVE FREE 10 ML: 5 INJECTION INTRAVENOUS at 20:43

## 2020-06-13 RX ADMIN — CALCIUM GLUCONATE: 94 INJECTION, SOLUTION INTRAVENOUS at 18:17

## 2020-06-13 RX ADMIN — MORPHINE SULFATE 4 MG: 4 INJECTION, SOLUTION INTRAMUSCULAR; INTRAVENOUS at 02:20

## 2020-06-13 RX ADMIN — Medication 10 ML: at 20:43

## 2020-06-13 RX ADMIN — NYSTATIN 500000 UNITS: 100000 SUSPENSION ORAL at 20:34

## 2020-06-13 RX ADMIN — MORPHINE SULFATE 4 MG: 4 INJECTION, SOLUTION INTRAMUSCULAR; INTRAVENOUS at 04:25

## 2020-06-13 RX ADMIN — DIAZEPAM 2 MG: 2 TABLET ORAL at 20:35

## 2020-06-13 RX ADMIN — MORPHINE SULFATE 30 MG: 1 INJECTION INTRAVENOUS at 11:10

## 2020-06-13 RX ADMIN — DIAZEPAM 2 MG: 2 TABLET ORAL at 09:44

## 2020-06-13 RX ADMIN — MORPHINE SULFATE 4 MG: 4 INJECTION, SOLUTION INTRAMUSCULAR; INTRAVENOUS at 07:32

## 2020-06-13 RX ADMIN — AMIODARONE HYDROCHLORIDE 200 MG: 200 TABLET ORAL at 09:44

## 2020-06-13 RX ADMIN — PIPERACILLIN AND TAZOBACTAM 3.38 G: 3; .375 INJECTION, POWDER, LYOPHILIZED, FOR SOLUTION INTRAVENOUS at 15:26

## 2020-06-13 RX ADMIN — PIPERACILLIN AND TAZOBACTAM 3.38 G: 3; .375 INJECTION, POWDER, LYOPHILIZED, FOR SOLUTION INTRAVENOUS at 00:26

## 2020-06-13 RX ADMIN — KETOROLAC TROMETHAMINE 15 MG: 15 INJECTION, SOLUTION INTRAMUSCULAR; INTRAVENOUS at 20:35

## 2020-06-13 RX ADMIN — PIPERACILLIN AND TAZOBACTAM 3.38 G: 3; .375 INJECTION, POWDER, LYOPHILIZED, FOR SOLUTION INTRAVENOUS at 07:04

## 2020-06-13 RX ADMIN — KETOROLAC TROMETHAMINE 15 MG: 15 INJECTION, SOLUTION INTRAMUSCULAR; INTRAVENOUS at 15:26

## 2020-06-13 RX ADMIN — NYSTATIN 500000 UNITS: 100000 SUSPENSION ORAL at 17:40

## 2020-06-13 RX ADMIN — PIPERACILLIN AND TAZOBACTAM 3.38 G: 3; .375 INJECTION, POWDER, LYOPHILIZED, FOR SOLUTION INTRAVENOUS at 23:17

## 2020-06-13 RX ADMIN — Medication 1 MG: at 09:56

## 2020-06-13 RX ADMIN — ENOXAPARIN SODIUM 40 MG: 40 INJECTION SUBCUTANEOUS at 11:09

## 2020-06-13 RX ADMIN — SODIUM CHLORIDE, POTASSIUM CHLORIDE, SODIUM LACTATE AND CALCIUM CHLORIDE: 600; 310; 30; 20 INJECTION, SOLUTION INTRAVENOUS at 21:56

## 2020-06-13 RX ADMIN — KETOROLAC TROMETHAMINE 15 MG: 15 INJECTION, SOLUTION INTRAMUSCULAR; INTRAVENOUS at 09:44

## 2020-06-13 RX ADMIN — FAMOTIDINE 20 MG: 10 INJECTION, SOLUTION INTRAVENOUS at 20:35

## 2020-06-13 ASSESSMENT — ENCOUNTER SYMPTOMS
RESPIRATORY NEGATIVE: 1
CONSTIPATION: 0
DIARRHEA: 0
RECTAL PAIN: 0
VOMITING: 0
ALLERGIC/IMMUNOLOGIC NEGATIVE: 1
ABDOMINAL PAIN: 1
ANAL BLEEDING: 0
BLOOD IN STOOL: 0
ABDOMINAL DISTENTION: 0
NAUSEA: 0
EYES NEGATIVE: 1

## 2020-06-13 ASSESSMENT — PAIN SCALES - GENERAL
PAINLEVEL_OUTOF10: 10
PAINLEVEL_OUTOF10: 8
PAINLEVEL_OUTOF10: 10
PAINLEVEL_OUTOF10: 10
PAINLEVEL_OUTOF10: 4
PAINLEVEL_OUTOF10: 10
PAINLEVEL_OUTOF10: 10
PAINLEVEL_OUTOF10: 8
PAINLEVEL_OUTOF10: 4
PAINLEVEL_OUTOF10: 8

## 2020-06-13 ASSESSMENT — PAIN DESCRIPTION - LOCATION
LOCATION: ABDOMEN
LOCATION: ABDOMEN

## 2020-06-13 ASSESSMENT — PAIN DESCRIPTION - ONSET
ONSET: ON-GOING
ONSET: ON-GOING

## 2020-06-13 ASSESSMENT — PAIN DESCRIPTION - PAIN TYPE
TYPE: SURGICAL PAIN;ACUTE PAIN
TYPE: SURGICAL PAIN

## 2020-06-13 ASSESSMENT — PAIN DESCRIPTION - DESCRIPTORS: DESCRIPTORS: ACHING;CONSTANT;TENDER

## 2020-06-13 ASSESSMENT — PAIN DESCRIPTION - PROGRESSION
CLINICAL_PROGRESSION: NOT CHANGED
CLINICAL_PROGRESSION: NOT CHANGED

## 2020-06-13 ASSESSMENT — PAIN DESCRIPTION - ORIENTATION
ORIENTATION: RIGHT
ORIENTATION: RIGHT;MID

## 2020-06-13 ASSESSMENT — PAIN DESCRIPTION - FREQUENCY
FREQUENCY: CONTINUOUS
FREQUENCY: CONTINUOUS

## 2020-06-13 NOTE — PLAN OF CARE
Nutrition Problem: Severe malnutrition, In context of acute illness or injury  Intervention: Food and/or Nutrient Delivery: Continue NPO, Start Parenteral Nutrition  Nutritional Goals: PN to meet >75% of energy and nutrient needs

## 2020-06-13 NOTE — CONSULTS
lesions  Neurologic: Grossly normal      Recent labs, Imaging studies reviewed      Data ReviewCBC:   Recent Labs     06/12/20  1410 06/13/20  0427   WBC 13.5* 20.0*   RBC 5.08 4.89   HGB 14.7 14.0   HCT 45.9 44.5   * 485*     BMP:   Recent Labs     06/12/20  1435 06/13/20  0018 06/13/20  0427   GLUCOSE 101* 157* 154*    140 142   K 3.9 4.6 5.0   BUN 9 9 10   CREATININE 0.79 0.89 1.00   CALCIUM 9.2 8.4* 8.8     ABGs: No results for input(s): PHART, PO2ART, OZF7DLU, GFO8GTI, BEART, J5YPEXEB, IWH8JPD in the last 72 hours. PT/INR:  No results found for: PTINR    ASSESSMENT / PLAN:    hypoxia - wean 02 to keep sats 92%  S/p lap with lysis adhesions and loop ileostomy - pain control  Incentive spirometer    Plan of care discussed with Dr Claritza Molina  Electronically signed by Shadi Cardenas on 06/13/20 at 3:43 PM.    REASON FOR VISIT: atelectasis, hypoxia  I examined the patient myself  The assessment and Plan in the note per my discussion with NP.     Electronically signed by Rosita Lou on 06/13/20 at 10:21 PM.

## 2020-06-13 NOTE — H&P
Allergic/Immunologic: Negative. Neurological: Negative. Hematological: Negative. Psychiatric/Behavioral: Negative. Physical Exam:   BP (!) 146/65   Pulse 81   Temp 98.2 °F (36.8 °C) (Temporal)   Resp 16   Ht 5' 10\" (1.778 m)   Wt 173 lb (78.5 kg)   SpO2 96%   BMI 24.82 kg/m²   Temp (24hrs), Av.3 °F (36.8 °C), Min:97.7 °F (36.5 °C), Max:98.6 °F (37 °C)    Recent Labs     20  2259   POCGLU 106       Intake/Output Summary (Last 24 hours) at 2020 0635  Last data filed at 2020 0548  Gross per 24 hour   Intake 2759.67 ml   Output 780 ml   Net 1979.67 ml       Physical Exam  Vitals signs and nursing note reviewed. Constitutional:       General: He is not in acute distress. Appearance: He is normal weight. He is ill-appearing and diaphoretic. He is not toxic-appearing. HENT:      Head: Normocephalic and atraumatic. Right Ear: External ear normal.      Left Ear: External ear normal.      Nose: Nose normal. No congestion or rhinorrhea. Comments: NG tube     Mouth/Throat:      Mouth: Mucous membranes are dry. Eyes:      General: No scleral icterus. Right eye: No discharge. Left eye: No discharge. Extraocular Movements: Extraocular movements intact. Conjunctiva/sclera: Conjunctivae normal.      Pupils: Pupils are equal, round, and reactive to light. Neck:      Musculoskeletal: Normal range of motion and neck supple. No neck rigidity or muscular tenderness. Vascular: No carotid bruit. Cardiovascular:      Rate and Rhythm: Normal rate and regular rhythm. Pulses: Normal pulses. Heart sounds: Normal heart sounds. No murmur. No friction rub. No gallop. Pulmonary:      Effort: Pulmonary effort is normal. No respiratory distress. Breath sounds: Normal breath sounds. No stridor. No wheezing, rhonchi or rales. Chest:      Chest wall: No tenderness. Abdominal:      Palpations: There is no mass. Tenderness:  There is abdominal tenderness. There is guarding. Hernia: No hernia is present. Comments: WILLIAM drain, R ostomy   Genitourinary:     Comments: Murphy present  Musculoskeletal:         General: No swelling, tenderness, deformity or signs of injury. Right lower leg: No edema. Left lower leg: No edema. Lymphadenopathy:      Cervical: No cervical adenopathy. Skin:     General: Skin is warm. Capillary Refill: Capillary refill takes less than 2 seconds. Coloration: Skin is pale. Skin is not jaundiced. Findings: No bruising, erythema, lesion or rash. Neurological:      Mental Status: He is alert and oriented to person, place, and time. Sensory: No sensory deficit. Motor: No weakness.       Coordination: Coordination normal.   Psychiatric:         Mood and Affect: Mood normal.         Behavior: Behavior normal.         Investigations:      Laboratory Testing:  Recent Results (from the past 24 hour(s))   CBC Auto Differential    Collection Time: 06/12/20  2:10 PM   Result Value Ref Range    WBC 13.5 (H) 3.5 - 11.3 k/uL    RBC 5.08 4.21 - 5.77 m/uL    Hemoglobin 14.7 13.0 - 17.0 g/dL    Hematocrit 45.9 40.7 - 50.3 %    MCV 90.4 82.6 - 102.9 fL    MCH 28.9 25.2 - 33.5 pg    MCHC 32.0 28.4 - 34.8 g/dL    RDW 13.1 11.8 - 14.4 %    Platelets 089 (H) 097 - 453 k/uL    MPV 10.1 8.1 - 13.5 fL    NRBC Automated 0.0 0.0 per 100 WBC    Differential Type NOT REPORTED     WBC Morphology NOT REPORTED     RBC Morphology NOT REPORTED     Platelet Estimate NOT REPORTED     Seg Neutrophils 65 36 - 65 %    Lymphocytes 21 (L) 24 - 43 %    Monocytes 5 3 - 12 %    Eosinophils % 8 (H) 1 - 4 %    Basophils 1 0 - 2 %    Immature Granulocytes 1 (H) 0 %    Segs Absolute 8.71 (H) 1.50 - 8.10 k/uL    Absolute Lymph # 2.78 1.10 - 3.70 k/uL    Absolute Mono # 0.70 0.10 - 1.20 k/uL    Absolute Eos # 1.07 (H) 0.00 - 0.44 k/uL    Basophils Absolute 0.12 0.00 - 0.20 k/uL    Absolute Immature Granulocyte 0.09 0.00 - 0.30 Glucose 157 (H) 70 - 99 mg/dL    BUN 9 8 - 23 mg/dL    CREATININE 0.89 0.70 - 1.20 mg/dL    Bun/Cre Ratio 10 9 - 20    Calcium 8.4 (L) 8.6 - 10.4 mg/dL    Sodium 140 135 - 144 mmol/L    Potassium 4.6 3.7 - 5.3 mmol/L    Chloride 105 98 - 107 mmol/L    CO2 22 20 - 31 mmol/L    Anion Gap 13 9 - 17 mmol/L    GFR Non-African American >60 >60 mL/min    GFR African American >60 >60 mL/min    GFR Comment          GFR Staging NOT REPORTED    Magnesium    Collection Time: 06/13/20  4:27 AM   Result Value Ref Range    Magnesium 1.9 1.6 - 2.6 mg/dL   CBC with DIFF    Collection Time: 06/13/20  4:27 AM   Result Value Ref Range    WBC 20.0 (H) 3.5 - 11.3 k/uL    RBC 4.89 4.21 - 5.77 m/uL    Hemoglobin 14.0 13.0 - 17.0 g/dL    Hematocrit 44.5 40.7 - 50.3 %    MCV 91.0 82.6 - 102.9 fL    MCH 28.6 25.2 - 33.5 pg    MCHC 31.5 28.4 - 34.8 g/dL    RDW 13.2 11.8 - 14.4 %    Platelets 661 (H) 960 - 453 k/uL    MPV 10.3 8.1 - 13.5 fL    NRBC Automated 0.0 0.0 per 100 WBC    Differential Type NOT REPORTED     WBC Morphology NOT REPORTED     RBC Morphology NOT REPORTED     Platelet Estimate NOT REPORTED     Seg Neutrophils 92 (H) 36 - 65 %    Lymphocytes 4 (L) 24 - 43 %    Monocytes 2 (L) 3 - 12 %    Eosinophils % 0 (L) 1 - 4 %    Basophils 0 0 - 2 %    Immature Granulocytes 1 (H) 0 %    Segs Absolute 18.44 (H) 1.50 - 8.10 k/uL    Absolute Lymph # 0.87 (L) 1.10 - 3.70 k/uL    Absolute Mono # 0.38 0.10 - 1.20 k/uL    Absolute Eos # <0.03 0.00 - 0.44 k/uL    Basophils Absolute 0.07 0.00 - 0.20 k/uL    Absolute Immature Granulocyte 0.20 0.00 - 0.30 k/uL   Comprehensive Metabolic Panel w/ Reflex to MG    Collection Time: 06/13/20  4:27 AM   Result Value Ref Range    Glucose 154 (H) 70 - 99 mg/dL    BUN 10 8 - 23 mg/dL    CREATININE 1.00 0.70 - 1.20 mg/dL    Bun/Cre Ratio 10 9 - 20    Calcium 8.8 8.6 - 10.4 mg/dL    Sodium 142 135 - 144 mmol/L    Potassium 5.0 3.7 - 5.3 mmol/L    Chloride 106 98 - 107 mmol/L    CO2 24 20 - 31 mmol/L    Anion Gap 12 9 - 17 mmol/L    Alkaline Phosphatase 78 40 - 129 U/L    ALT 19 5 - 41 U/L    AST 24 <40 U/L    Total Bilirubin 0.32 0.3 - 1.2 mg/dL    Total Protein 6.0 (L) 6.4 - 8.3 g/dL    Alb 3.0 (L) 3.5 - 5.2 g/dL    Albumin/Globulin Ratio NOT REPORTED 1.0 - 2.5    GFR Non-African American >60 >60 mL/min    GFR African American >60 >60 mL/min    GFR Comment          GFR Staging NOT REPORTED    Phosphorus    Collection Time: 06/13/20  4:27 AM   Result Value Ref Range    Phosphorus 4.2 2.5 - 4.5 mg/dL       Imaging/Diagnostics:  Ct Abdomen Pelvis W Iv Contrast Additional Contrast? None    Result Date: 6/12/2020  Large volume of pneumoperitoneum, more than expected for two weeks postoperative from hemicolectomy. Findings are concerning for leak or perforation elsewhere, possibly in upper abdomen given abnormal appearance of proximal small bowel. Ascites. Abdominal aorta measures 2.9 cm and is stable. Gallstones. Critical results were called by Dr. Mercedez Villa to [address] on 6/12/2020 at 15:32. Assessment :      Hospital Problems           Last Modified POA    * (Principal) Intra-abdominal abscess ([facility] [address] 6/13/2020 Yes    PTSD (post-traumatic stress disorder) 6/13/2020 Yes    Neuropathy 6/13/2020 Yes    Essential hypertension 6/13/2020 Yes    Overview Signed 6/13/2020  5:48 AM by OFELIA Mathias - CNP     Jul 29, 2013 Entered By: Dell Mendes MD Comment: patient reports good BP control at home; will watch (6/13)         Hyperlipidemia 6/13/2020 Yes    Tobacco use disorder 6/13/2020 Yes    Pneumoperitoneum 6/13/2020 Yes                Plan:     Patient status inpatient in the  Medical ICU    1. Admit as inpatient to the ICU in the postoperative. Under the internal medicine service  2. Antimicrobial of Zosyn  3. IV fluid of lactated Ringer's at 100 mL/h for hydration  4. Pain management of morphine in the postoperative period  5.  Labs of CBC, CMP, ionized calcium and replete electrolytes as

## 2020-06-13 NOTE — PROGRESS NOTES
TECHNIQUE: CT of the abdomen and pelvis was performed with the administration of intravenous contrast. Multiplanar reformatted images are provided for review. Dose modulation, iterative reconstruction, and/or weight based adjustment of the mA/kV was utilized to reduce the radiation dose to as low as reasonably achievable. COMPARISON: None HISTORY: ORDERING SYSTEM PROVIDED HISTORY: abd distension s/p hemicolectomy TECHNOLOGIST PROVIDED HISTORY: abd distension s/p hemicolectomy Reason for Exam: abd distension s/p hemicolectomy Acuity: Unknown Type of Exam: Unknown FINDINGS: Lower Chest: Clear lung bases. No effusion. No consolidation. Organs: No focal hepatic lesions. Pericholecystic fluid and gallstones. No splenomegaly or lesion in the spleen. Normal pancreas. No adrenal lesion. Symmetric kidneys. GI/Bowel: Postoperative changes from hemicolectomy. Probable anastomotic leak at the site of anastomosis given the extensive pneumoperitoneum. Abnormal appearance of small bowel proximally (series 2 image 53) possibly a site of perforation/leak. Pelvis: Free fluid in the pelvis. Bladder wall thickening. Enlarged prostate. Peritoneum/Retroperitoneum: Free abdominopelvic fluid. Extensive free air. Shotty lymph nodes likely reactive to recent surgery. Fusiform ectasia of the abdominal aorta measuring up to 2.9 cm. Bones/Soft Tissues: No acute bone or soft tissue abnormality. Large volume of pneumoperitoneum, more than expected for two weeks postoperative from hemicolectomy. Findings are concerning for leak or perforation elsewhere, possibly in upper abdomen given abnormal appearance of proximal small bowel. Ascites. Abdominal aorta measures 2.9 cm and is stable. Gallstones. Critical results were called by Dr. Priscila Christian to [address] on 6/12/2020 at 15:32.        ASSESSMENT     POD#1 ex lap, extensive lysis of adhesions, repair of serosal/colonic tear, drainage of subhepatic abscess, protective diverting

## 2020-06-14 ENCOUNTER — APPOINTMENT (OUTPATIENT)
Dept: GENERAL RADIOLOGY | Age: 70
DRG: 856 | End: 2020-06-14
Payer: MEDICARE

## 2020-06-14 LAB
ABSOLUTE EOS #: <0.03 K/UL (ref 0–0.44)
ABSOLUTE IMMATURE GRANULOCYTE: 0.13 K/UL (ref 0–0.3)
ABSOLUTE LYMPH #: 1.83 K/UL (ref 1.1–3.7)
ABSOLUTE MONO #: 0.95 K/UL (ref 0.1–1.2)
ALBUMIN SERPL-MCNC: 2.5 G/DL (ref 3.5–5.2)
ALBUMIN/GLOBULIN RATIO: ABNORMAL (ref 1–2.5)
ALP BLD-CCNC: 63 U/L (ref 40–129)
ALT SERPL-CCNC: 14 U/L (ref 5–41)
ANION GAP SERPL CALCULATED.3IONS-SCNC: 9 MMOL/L (ref 9–17)
AST SERPL-CCNC: 17 U/L
BASOPHILS # BLD: 0 % (ref 0–2)
BASOPHILS ABSOLUTE: 0.05 K/UL (ref 0–0.2)
BILIRUB SERPL-MCNC: 0.22 MG/DL (ref 0.3–1.2)
BUN BLDV-MCNC: 16 MG/DL (ref 8–23)
BUN/CREAT BLD: 16 (ref 9–20)
CALCIUM SERPL-MCNC: 8.2 MG/DL (ref 8.6–10.4)
CHLORIDE BLD-SCNC: 103 MMOL/L (ref 98–107)
CHOLESTEROL/HDL RATIO: 4.4
CHOLESTEROL: 105 MG/DL
CO2: 27 MMOL/L (ref 20–31)
CREAT SERPL-MCNC: 0.99 MG/DL (ref 0.7–1.2)
DIFFERENTIAL TYPE: ABNORMAL
EOSINOPHILS RELATIVE PERCENT: 0 % (ref 1–4)
GFR AFRICAN AMERICAN: >60 ML/MIN
GFR NON-AFRICAN AMERICAN: >60 ML/MIN
GFR SERPL CREATININE-BSD FRML MDRD: ABNORMAL ML/MIN/{1.73_M2}
GFR SERPL CREATININE-BSD FRML MDRD: ABNORMAL ML/MIN/{1.73_M2}
GLUCOSE BLD-MCNC: 103 MG/DL (ref 75–110)
GLUCOSE BLD-MCNC: 109 MG/DL (ref 75–110)
GLUCOSE BLD-MCNC: 112 MG/DL (ref 75–110)
GLUCOSE BLD-MCNC: 120 MG/DL (ref 75–110)
GLUCOSE BLD-MCNC: 132 MG/DL (ref 70–99)
GLUCOSE BLD-MCNC: 96 MG/DL (ref 75–110)
HCT VFR BLD CALC: 37 % (ref 40.7–50.3)
HDLC SERPL-MCNC: 24 MG/DL
HEMOGLOBIN: 11.7 G/DL (ref 13–17)
IMMATURE GRANULOCYTES: 1 %
LDL CHOLESTEROL: 52 MG/DL (ref 0–130)
LYMPHOCYTES # BLD: 10 % (ref 24–43)
MAGNESIUM: 2.1 MG/DL (ref 1.6–2.6)
MCH RBC QN AUTO: 29.1 PG (ref 25.2–33.5)
MCHC RBC AUTO-ENTMCNC: 31.6 G/DL (ref 28.4–34.8)
MCV RBC AUTO: 92 FL (ref 82.6–102.9)
MONOCYTES # BLD: 5 % (ref 3–12)
NRBC AUTOMATED: 0 PER 100 WBC
PDW BLD-RTO: 13.3 % (ref 11.8–14.4)
PHOSPHORUS: 2.8 MG/DL (ref 2.5–4.5)
PLATELET # BLD: 373 K/UL (ref 138–453)
PLATELET ESTIMATE: ABNORMAL
PMV BLD AUTO: 10.5 FL (ref 8.1–13.5)
POTASSIUM SERPL-SCNC: 4.1 MMOL/L (ref 3.7–5.3)
RBC # BLD: 4.02 M/UL (ref 4.21–5.77)
RBC # BLD: ABNORMAL 10*6/UL
SEG NEUTROPHILS: 84 % (ref 36–65)
SEGMENTED NEUTROPHILS ABSOLUTE COUNT: 15.13 K/UL (ref 1.5–8.1)
SODIUM BLD-SCNC: 139 MMOL/L (ref 135–144)
TOTAL PROTEIN: 5.2 G/DL (ref 6.4–8.3)
TRIGL SERPL-MCNC: 146 MG/DL
VLDLC SERPL CALC-MCNC: ABNORMAL MG/DL (ref 1–30)
WBC # BLD: 18.1 K/UL (ref 3.5–11.3)
WBC # BLD: ABNORMAL 10*3/UL

## 2020-06-14 PROCEDURE — 2500000003 HC RX 250 WO HCPCS: Performed by: INTERNAL MEDICINE

## 2020-06-14 PROCEDURE — 83735 ASSAY OF MAGNESIUM: CPT

## 2020-06-14 PROCEDURE — 6370000000 HC RX 637 (ALT 250 FOR IP): Performed by: STUDENT IN AN ORGANIZED HEALTH CARE EDUCATION/TRAINING PROGRAM

## 2020-06-14 PROCEDURE — 71045 X-RAY EXAM CHEST 1 VIEW: CPT

## 2020-06-14 PROCEDURE — 94770 HC ETCO2 MONITOR DAILY: CPT

## 2020-06-14 PROCEDURE — 2500000003 HC RX 250 WO HCPCS: Performed by: STUDENT IN AN ORGANIZED HEALTH CARE EDUCATION/TRAINING PROGRAM

## 2020-06-14 PROCEDURE — 85025 COMPLETE CBC W/AUTO DIFF WBC: CPT

## 2020-06-14 PROCEDURE — 6360000002 HC RX W HCPCS: Performed by: STUDENT IN AN ORGANIZED HEALTH CARE EDUCATION/TRAINING PROGRAM

## 2020-06-14 PROCEDURE — 2580000003 HC RX 258: Performed by: STUDENT IN AN ORGANIZED HEALTH CARE EDUCATION/TRAINING PROGRAM

## 2020-06-14 PROCEDURE — 6360000002 HC RX W HCPCS: Performed by: NURSE PRACTITIONER

## 2020-06-14 PROCEDURE — 94761 N-INVAS EAR/PLS OXIMETRY MLT: CPT

## 2020-06-14 PROCEDURE — 6370000000 HC RX 637 (ALT 250 FOR IP): Performed by: COLON & RECTAL SURGERY

## 2020-06-14 PROCEDURE — 6370000000 HC RX 637 (ALT 250 FOR IP): Performed by: INTERNAL MEDICINE

## 2020-06-14 PROCEDURE — 84100 ASSAY OF PHOSPHORUS: CPT

## 2020-06-14 PROCEDURE — 99231 SBSQ HOSP IP/OBS SF/LOW 25: CPT | Performed by: INTERNAL MEDICINE

## 2020-06-14 PROCEDURE — 80061 LIPID PANEL: CPT

## 2020-06-14 PROCEDURE — 82947 ASSAY GLUCOSE BLOOD QUANT: CPT

## 2020-06-14 PROCEDURE — 36415 COLL VENOUS BLD VENIPUNCTURE: CPT

## 2020-06-14 PROCEDURE — 2060000000 HC ICU INTERMEDIATE R&B

## 2020-06-14 PROCEDURE — 80053 COMPREHEN METABOLIC PANEL: CPT

## 2020-06-14 RX ORDER — CLONAZEPAM 0.5 MG/1
0.5 TABLET ORAL 2 TIMES DAILY
Status: DISCONTINUED | OUTPATIENT
Start: 2020-06-14 | End: 2020-06-18 | Stop reason: HOSPADM

## 2020-06-14 RX ORDER — CLONAZEPAM 0.5 MG/1
0.5 TABLET ORAL 2 TIMES DAILY PRN
Status: DISCONTINUED | OUTPATIENT
Start: 2020-06-14 | End: 2020-06-14

## 2020-06-14 RX ORDER — DIPHENHYDRAMINE HYDROCHLORIDE 50 MG/ML
12.5 INJECTION INTRAMUSCULAR; INTRAVENOUS ONCE
Status: COMPLETED | OUTPATIENT
Start: 2020-06-14 | End: 2020-06-14

## 2020-06-14 RX ORDER — CYCLOBENZAPRINE HCL 5 MG
5 TABLET ORAL EVERY 8 HOURS
Status: DISCONTINUED | OUTPATIENT
Start: 2020-06-14 | End: 2020-06-18 | Stop reason: HOSPADM

## 2020-06-14 RX ADMIN — CLONAZEPAM 0.5 MG: 0.5 TABLET ORAL at 20:52

## 2020-06-14 RX ADMIN — FAMOTIDINE 20 MG: 10 INJECTION, SOLUTION INTRAVENOUS at 20:52

## 2020-06-14 RX ADMIN — ACETAMINOPHEN 1000 MG: 500 TABLET ORAL at 18:08

## 2020-06-14 RX ADMIN — I.V. FAT EMULSION 250 ML: 20 EMULSION INTRAVENOUS at 06:39

## 2020-06-14 RX ADMIN — KETOROLAC TROMETHAMINE 15 MG: 15 INJECTION, SOLUTION INTRAMUSCULAR; INTRAVENOUS at 10:01

## 2020-06-14 RX ADMIN — AMIODARONE HYDROCHLORIDE 200 MG: 200 TABLET ORAL at 10:02

## 2020-06-14 RX ADMIN — DIAZEPAM 2 MG: 2 TABLET ORAL at 03:11

## 2020-06-14 RX ADMIN — CYCLOBENZAPRINE HYDROCHLORIDE 5 MG: 5 TABLET, FILM COATED ORAL at 06:37

## 2020-06-14 RX ADMIN — KETOROLAC TROMETHAMINE 15 MG: 15 INJECTION, SOLUTION INTRAMUSCULAR; INTRAVENOUS at 20:52

## 2020-06-14 RX ADMIN — PIPERACILLIN AND TAZOBACTAM 3.38 G: 3; .375 INJECTION, POWDER, LYOPHILIZED, FOR SOLUTION INTRAVENOUS at 22:46

## 2020-06-14 RX ADMIN — NYSTATIN 500000 UNITS: 100000 SUSPENSION ORAL at 20:52

## 2020-06-14 RX ADMIN — FAMOTIDINE 20 MG: 10 INJECTION, SOLUTION INTRAVENOUS at 10:01

## 2020-06-14 RX ADMIN — DIPHENHYDRAMINE HYDROCHLORIDE 12.5 MG: 50 INJECTION, SOLUTION INTRAMUSCULAR; INTRAVENOUS at 00:50

## 2020-06-14 RX ADMIN — ENOXAPARIN SODIUM 40 MG: 40 INJECTION SUBCUTANEOUS at 10:02

## 2020-06-14 RX ADMIN — CALCIUM GLUCONATE: 94 INJECTION, SOLUTION INTRAVENOUS at 17:49

## 2020-06-14 RX ADMIN — PIPERACILLIN AND TAZOBACTAM 3.38 G: 3; .375 INJECTION, POWDER, LYOPHILIZED, FOR SOLUTION INTRAVENOUS at 15:31

## 2020-06-14 RX ADMIN — PIPERACILLIN AND TAZOBACTAM 3.38 G: 3; .375 INJECTION, POWDER, LYOPHILIZED, FOR SOLUTION INTRAVENOUS at 06:37

## 2020-06-14 RX ADMIN — SODIUM CHLORIDE, PRESERVATIVE FREE 10 ML: 5 INJECTION INTRAVENOUS at 20:53

## 2020-06-14 RX ADMIN — I.V. FAT EMULSION 100 ML: 20 EMULSION INTRAVENOUS at 17:54

## 2020-06-14 RX ADMIN — KETOROLAC TROMETHAMINE 15 MG: 15 INJECTION, SOLUTION INTRAMUSCULAR; INTRAVENOUS at 15:31

## 2020-06-14 RX ADMIN — Medication 10 ML: at 20:52

## 2020-06-14 RX ADMIN — ACETAMINOPHEN 1000 MG: 500 TABLET ORAL at 10:02

## 2020-06-14 RX ADMIN — KETOROLAC TROMETHAMINE 15 MG: 15 INJECTION, SOLUTION INTRAMUSCULAR; INTRAVENOUS at 03:11

## 2020-06-14 RX ADMIN — CLONAZEPAM 0.5 MG: 0.5 TABLET ORAL at 11:11

## 2020-06-14 RX ADMIN — CYCLOBENZAPRINE HYDROCHLORIDE 5 MG: 5 TABLET, FILM COATED ORAL at 15:31

## 2020-06-14 RX ADMIN — CYCLOBENZAPRINE HYDROCHLORIDE 5 MG: 5 TABLET, FILM COATED ORAL at 22:46

## 2020-06-14 RX ADMIN — NYSTATIN 500000 UNITS: 100000 SUSPENSION ORAL at 13:30

## 2020-06-14 RX ADMIN — NYSTATIN 500000 UNITS: 100000 SUSPENSION ORAL at 10:01

## 2020-06-14 ASSESSMENT — PAIN SCALES - GENERAL
PAINLEVEL_OUTOF10: 8
PAINLEVEL_OUTOF10: 4

## 2020-06-14 NOTE — PLAN OF CARE
Problem: Pain:  Intervention: Promote participation in pain management plan  Note: Comfort level assessed at frequent intervals. Medicated PRN. Encouraged the use of both pharmacological and non-pharmacological methods of pain control. Assisted with position changes for comfort. Instructed to call with any c/o pain. Monitored the effectiveness of all interventions utilized. Encouraged increased movement and use of IS. Monitored the effectiveness of all interventions utilized.

## 2020-06-14 NOTE — PROGRESS NOTES
Patient is settled in bed, repositioned for comfort. Alert & oriented X 4 and pleasant. Pain tolerable at a 4/10. VSS. Call light within reach.  Ice chips and popsicles given per his request.

## 2020-06-14 NOTE — CARE COORDINATION
Case Management Initial Discharge Plan  Sandra Wilson,             Met with:patient to discuss discharge plans. Information verified: address, contacts, phone number, , insurance Yes  PCP: Criselda Garza MD  Date of last visit:     Insurance Provider: Eric[employer]    Discharge Planning    Living Arrangements:   Wife   Support Systems:   family    Home has 1 stories  5 stairs to climb to get into front door, 0 stairs to climb to reach second floor  Location of bedroom/bathroom in home  - main floor    Patient able to perform ADL's:Independent    Current Services (outpatient & in home) none  DME equipment: none  DME provider: N/A    Pharmacy: [facility] in [place], [address]    Potential Assistance Needed:   N/A    Patient agreeable to home care: No  [place] of choice provided:  n/a    Prior SNF/Rehab Placement and Facility: No  Agreeable to SNF/Rehab: No  [place] of choice provided: n/a   Evaluation: n/a    Expected Discharge date:     Patient expects to be discharged to: Follow Up Appointment: Best Day/ Time:      Transportation provider: wife  Transportation arrangements needed for discharge: No    Readmission Risk              Risk of Unplanned Readmission:        12             Does patient have a readmission risk score greater than 14?: No  If yes, follow-up appointment must be made within 7 days of discharge. Goal of Care:       Discharge Plan: Met with patient at bedside. Lives with wife, independent and drives. Was D/C home 6-3-20 S/P exp lap with colon resection per Dr. Anny Sawyer. Returned to ED 6-12 for abd bloating. Taken to OR and had exp lap, repair of serosal tears, drainage of abscess and ileostomy creation. Has NG in place which is being clamped and TPN per PICC line. Currently on IV Zosyn. Pt will be transferring to PCU this evening. Declines home care at this time but continue to follow since pt has new ostomy.   Will need post op appt with Dr. Anny Sawyer prior

## 2020-06-14 NOTE — PROGRESS NOTES
[facility]    Progress Note    6/14/2020    4:05 PM    Name:   Samantha Edward  MRN:     [id number]     Acct:      [de-identified]   Room:   [facility]9/[facility] Day:  2  Admit Date:  6/12/2020  1:06 PM    PCP:   Soren Cardozo MD  Code Status:  Full Code    Subjective:     C/C:   Chief Complaint   Patient presents with    Abdominal Pain    Bloated     Interval History Status: improved. Patient seen and examined this morning. No acute events overnight. NG tube is been clamped and he is tolerating popsicles. Ostomy is with dark green semisolid stool output. Patient reports that his pain is well controlled with PCA pump. Vital signs are currently stable. He remains afebrile. Brief History:     Mr. Samantha Edward is a 69year old gentleman who is presenting to [facility] DR MIRELLA [place] on 6/12/2020 for evaluation of worsening abdominal bloating and shortness of breath. He was evaluated by his PCP who ordered an x-ray which revealed significant air. Was instructed to come to the hospital for further evaluation. His history is notable for history of PTSD and recent ex lap with ascending and transverse colon resection and primary anastomosis on 5/28 with Dr. Leigh Ann Connor. Patient was discharged from that recent hospitalization in good condition. Of note, he did have new onset atrial fibrillation for which she is currently on amiodarone to maintain sinus rhythm for. Upon arrival in evaluation to our emergency department on 6/12, patient underwent CT scan scanning which revealed pneumoperitoneum, worse than expected in the postoperative period from recent surgery. He underwent urgent surgery with Dr. Leigh Ann Connor again  which resulted in exploratory laparotomy with lysis of adhesions and repair of serosal tears along with a drainage of a subhepatic abscess and creation of a loop ileostomy. Patient seen this morning the perioperative period.   He is postop day 1 from the 0.99   MG 1.9 1.9 2.1   ANIONGAP 13 12 9   LABGLOM >60 >60 >60   GFRAA >60 >60 >60   CALCIUM 8.4* 8.8 8.2*   CAION 1.24 1.25  --    PHOS 3.8 4.2 2.8     Recent Labs     06/12/20  1435 06/12/20  2259 06/13/20  0427 06/13/20  2033 06/14/20  0223 06/14/20  0420 06/14/20  0822 06/14/20  1335   PROT 6.2*  --  6.0*  --   --  5.2*  --   --    LABALBU 3.0*  --  3.0*  --   --  2.5*  --   --    AST 17  --  24  --   --  17  --   --    ALT 14  --  19  --   --  14  --   --    ALKPHOS 81  --  78  --   --  63  --   --    BILITOT 0.27*  --  0.32  --   --  0.22*  --   --    LIPASE 31  --   --   --   --   --   --   --    CHOL  --   --   --   --   --  105  --   --    HDL  --   --   --   --   --  24*  --   --    LDLCHOLESTEROL  --   --   --   --   --  52  --   --    CHOLHDLRATIO  --   --   --   --   --  4.4  --   --    TRIG  --   --   --   --   --  146  --   --    VLDL  --   --   --   --   --  NOT REPORTED  --   --    POCGLU  --  106  --  117* 120*  --  109 112*     ABG:No results found for: POCPH, PHART, PH, POCPCO2, JMM6RXB, PCO2, POCPO2, PO2ART, PO2, POCHCO3, OBA4EYV, HCO3, NBEA, PBEA, BEART, BE, THGBART, THB, PVN1YMX, RMWY9MKC, L7HOCIRO, O2SAT, FIO2  Lab Results   Component Value Date/Time    SPECIAL NOT REPORTED 06/12/2020 09:02 PM     Lab Results   Component Value Date/Time    CULTURE PENDING 06/12/2020 09:02 PM       Radiology:  Ct Abdomen Pelvis W Iv Contrast Additional Contrast? None    Result Date: 6/12/2020  Large volume of pneumoperitoneum, more than expected for two weeks postoperative from hemicolectomy. Findings are concerning for leak or perforation elsewhere, possibly in upper abdomen given abnormal appearance of proximal small bowel. Ascites. Abdominal aorta measures 2.9 cm and is stable. Gallstones. Critical results were called by Dr. Bradley Wheat to [facility] on 6/12/2020 at 15:32. Xr Chest Portable    Result Date: 6/14/2020  Support tubes and lines as above. Mild cardiomegaly.   No edema or focal consolidation. Old granulomatous changes are noted. Physical Examination:        General appearance:  alert, cooperative and no distress, elderly  gentleman  Mental Status:  oriented to person, place and time and normal affect  HENT: NG tube present, no icterus  Lungs:  clear to auscultation bilaterally, normal effort  Heart:  regular rate and rhythm, no murmur  Abdomen:  soft, nontender, nondistended, normal bowel sounds, no masses, hepatomegaly, splenomegaly; right-sided WILLIAM drain is present in the anterior abdominal wall, ostomy is noted to have dark green semi-formed stool in the right lower quadrant; anterior abdominal incision is covered by guaze, no strikethrough  Extremities:  no edema, redness, tenderness in the calves  Skin:  no gross lesions, rashes, induration    Assessment:        Hospital Problems           Last Modified POA    * (Principal) Intra-abdominal abscess ([address] 6/13/2020 Yes    Pneumoperitoneum 6/13/2020 Yes    PTSD (post-traumatic stress disorder) 6/13/2020 Yes    Neuropathy 6/13/2020 Yes    Essential hypertension 6/13/2020 Yes    Overview Signed 6/13/2020  5:48 AM by OFELIA Benjamin - CNP     Jul 29, 2013 Entered By: Rivera Lucas MD Comment: patient reports good BP control at home; will watch (6/13)         Hyperlipidemia 6/13/2020 Yes    Tobacco use disorder 6/13/2020 Yes    Postoperative atrial fibrillation ([address] 6/13/2020 Yes    Severe malnutrition ([address] 6/13/2020 Yes                Plan:        1. Appreciate colorectal surgery input   2. Continue PCA pump for pain  3. Continue TPN/PPN; appreciate dietary assistance with dosing  4. NG tube was clamped, patient tolerating ice chips and popsicles with good output in his ostomy  5. Currently on Zosyn  6. Frequency and spirometry  7. Increase activity, PT OT  8. Continue amiodarone  9. Continue Klonopin for PTSD/anxiety  10. IV Pepcid for GI prophylaxis  11. Lovenox for DVT prophylaxis  12.  Toradol, Tylenol for pain

## 2020-06-14 NOTE — PROGRESS NOTES
Patient transported via bed to room 1016. Patient transferred from bed to PCCU bed. ERIKA Barry present. Patient accepting of transfer.

## 2020-06-14 NOTE — PROGRESS NOTES
Report called to [place], RN on [employer]. Patient is aware of transfer and is accepting of transfer.  Patient will be transferred with all monitoring equipment and belongings

## 2020-06-15 PROBLEM — D72.9 NEUTROPHILIC LEUKOCYTOSIS: Status: ACTIVE | Noted: 2020-06-15

## 2020-06-15 LAB
ABSOLUTE EOS #: 0.32 K/UL (ref 0–0.44)
ABSOLUTE IMMATURE GRANULOCYTE: 0.1 K/UL (ref 0–0.3)
ABSOLUTE LYMPH #: 2.65 K/UL (ref 1.1–3.7)
ABSOLUTE MONO #: 1.01 K/UL (ref 0.1–1.2)
ALBUMIN SERPL-MCNC: 2.4 G/DL (ref 3.5–5.2)
ANION GAP SERPL CALCULATED.3IONS-SCNC: 9 MMOL/L (ref 9–17)
BASOPHILS # BLD: 1 % (ref 0–2)
BASOPHILS ABSOLUTE: 0.07 K/UL (ref 0–0.2)
BUN BLDV-MCNC: 13 MG/DL (ref 8–23)
BUN/CREAT BLD: 14 (ref 9–20)
CALCIUM SERPL-MCNC: 7.9 MG/DL (ref 8.6–10.4)
CHLORIDE BLD-SCNC: 105 MMOL/L (ref 98–107)
CO2: 26 MMOL/L (ref 20–31)
CREAT SERPL-MCNC: 0.93 MG/DL (ref 0.7–1.2)
DIFFERENTIAL TYPE: ABNORMAL
EOSINOPHILS RELATIVE PERCENT: 2 % (ref 1–4)
GFR AFRICAN AMERICAN: >60 ML/MIN
GFR NON-AFRICAN AMERICAN: >60 ML/MIN
GFR SERPL CREATININE-BSD FRML MDRD: ABNORMAL ML/MIN/{1.73_M2}
GFR SERPL CREATININE-BSD FRML MDRD: ABNORMAL ML/MIN/{1.73_M2}
GLUCOSE BLD-MCNC: 102 MG/DL (ref 75–110)
GLUCOSE BLD-MCNC: 104 MG/DL (ref 75–110)
GLUCOSE BLD-MCNC: 105 MG/DL (ref 70–99)
GLUCOSE BLD-MCNC: 94 MG/DL (ref 75–110)
GLUCOSE BLD-MCNC: 98 MG/DL (ref 75–110)
GLUCOSE BLD-MCNC: 98 MG/DL (ref 75–110)
HCT VFR BLD CALC: 36.2 % (ref 40.7–50.3)
HEMOGLOBIN: 11.4 G/DL (ref 13–17)
IMMATURE GRANULOCYTES: 1 %
LYMPHOCYTES # BLD: 18 % (ref 24–43)
MAGNESIUM: 1.9 MG/DL (ref 1.6–2.6)
MCH RBC QN AUTO: 28.9 PG (ref 25.2–33.5)
MCHC RBC AUTO-ENTMCNC: 31.5 G/DL (ref 28.4–34.8)
MCV RBC AUTO: 91.9 FL (ref 82.6–102.9)
MONOCYTES # BLD: 7 % (ref 3–12)
NRBC AUTOMATED: 0 PER 100 WBC
PDW BLD-RTO: 13.2 % (ref 11.8–14.4)
PHOSPHORUS: 2.7 MG/DL (ref 2.5–4.5)
PLATELET # BLD: 311 K/UL (ref 138–453)
PLATELET ESTIMATE: ABNORMAL
PMV BLD AUTO: 10.3 FL (ref 8.1–13.5)
POTASSIUM SERPL-SCNC: 4 MMOL/L (ref 3.7–5.3)
RBC # BLD: 3.94 M/UL (ref 4.21–5.77)
RBC # BLD: ABNORMAL 10*6/UL
SEG NEUTROPHILS: 71 % (ref 36–65)
SEGMENTED NEUTROPHILS ABSOLUTE COUNT: 10.57 K/UL (ref 1.5–8.1)
SODIUM BLD-SCNC: 140 MMOL/L (ref 135–144)
WBC # BLD: 14.7 K/UL (ref 3.5–11.3)
WBC # BLD: ABNORMAL 10*3/UL

## 2020-06-15 PROCEDURE — 99213 OFFICE O/P EST LOW 20 MIN: CPT

## 2020-06-15 PROCEDURE — 83735 ASSAY OF MAGNESIUM: CPT

## 2020-06-15 PROCEDURE — 6360000002 HC RX W HCPCS: Performed by: STUDENT IN AN ORGANIZED HEALTH CARE EDUCATION/TRAINING PROGRAM

## 2020-06-15 PROCEDURE — 94770 HC ETCO2 MONITOR DAILY: CPT

## 2020-06-15 PROCEDURE — 6360000002 HC RX W HCPCS: Performed by: NURSE PRACTITIONER

## 2020-06-15 PROCEDURE — 6370000000 HC RX 637 (ALT 250 FOR IP): Performed by: INTERNAL MEDICINE

## 2020-06-15 PROCEDURE — 2500000003 HC RX 250 WO HCPCS: Performed by: STUDENT IN AN ORGANIZED HEALTH CARE EDUCATION/TRAINING PROGRAM

## 2020-06-15 PROCEDURE — 6370000000 HC RX 637 (ALT 250 FOR IP): Performed by: COLON & RECTAL SURGERY

## 2020-06-15 PROCEDURE — 2580000003 HC RX 258: Performed by: STUDENT IN AN ORGANIZED HEALTH CARE EDUCATION/TRAINING PROGRAM

## 2020-06-15 PROCEDURE — 85025 COMPLETE CBC W/AUTO DIFF WBC: CPT

## 2020-06-15 PROCEDURE — 94761 N-INVAS EAR/PLS OXIMETRY MLT: CPT

## 2020-06-15 PROCEDURE — 36415 COLL VENOUS BLD VENIPUNCTURE: CPT

## 2020-06-15 PROCEDURE — 6370000000 HC RX 637 (ALT 250 FOR IP): Performed by: STUDENT IN AN ORGANIZED HEALTH CARE EDUCATION/TRAINING PROGRAM

## 2020-06-15 PROCEDURE — 2060000000 HC ICU INTERMEDIATE R&B

## 2020-06-15 PROCEDURE — 99232 SBSQ HOSP IP/OBS MODERATE 35: CPT | Performed by: INTERNAL MEDICINE

## 2020-06-15 PROCEDURE — 80069 RENAL FUNCTION PANEL: CPT

## 2020-06-15 PROCEDURE — 82947 ASSAY GLUCOSE BLOOD QUANT: CPT

## 2020-06-15 RX ORDER — DIPHENHYDRAMINE HYDROCHLORIDE 50 MG/ML
25 INJECTION INTRAMUSCULAR; INTRAVENOUS ONCE
Status: COMPLETED | OUTPATIENT
Start: 2020-06-15 | End: 2020-06-15

## 2020-06-15 RX ORDER — IPRATROPIUM BROMIDE AND ALBUTEROL SULFATE 2.5; .5 MG/3ML; MG/3ML
1 SOLUTION RESPIRATORY (INHALATION) EVERY 4 HOURS PRN
Status: DISCONTINUED | OUTPATIENT
Start: 2020-06-15 | End: 2020-06-18 | Stop reason: HOSPADM

## 2020-06-15 RX ADMIN — MORPHINE SULFATE 30 MG: 1 INJECTION INTRAVENOUS at 06:57

## 2020-06-15 RX ADMIN — CLONAZEPAM 0.5 MG: 0.5 TABLET ORAL at 21:56

## 2020-06-15 RX ADMIN — KETOROLAC TROMETHAMINE 15 MG: 15 INJECTION, SOLUTION INTRAMUSCULAR; INTRAVENOUS at 16:25

## 2020-06-15 RX ADMIN — CALCIUM GLUCONATE: 94 INJECTION, SOLUTION INTRAVENOUS at 17:16

## 2020-06-15 RX ADMIN — KETOROLAC TROMETHAMINE 15 MG: 15 INJECTION, SOLUTION INTRAMUSCULAR; INTRAVENOUS at 09:40

## 2020-06-15 RX ADMIN — ENOXAPARIN SODIUM 40 MG: 40 INJECTION SUBCUTANEOUS at 09:40

## 2020-06-15 RX ADMIN — NYSTATIN 500000 UNITS: 100000 SUSPENSION ORAL at 21:56

## 2020-06-15 RX ADMIN — NYSTATIN 500000 UNITS: 100000 SUSPENSION ORAL at 17:16

## 2020-06-15 RX ADMIN — CYCLOBENZAPRINE HYDROCHLORIDE 5 MG: 5 TABLET, FILM COATED ORAL at 23:31

## 2020-06-15 RX ADMIN — CYCLOBENZAPRINE HYDROCHLORIDE 5 MG: 5 TABLET, FILM COATED ORAL at 16:26

## 2020-06-15 RX ADMIN — PIPERACILLIN AND TAZOBACTAM 3.38 G: 3; .375 INJECTION, POWDER, LYOPHILIZED, FOR SOLUTION INTRAVENOUS at 23:30

## 2020-06-15 RX ADMIN — SODIUM CHLORIDE, POTASSIUM CHLORIDE, SODIUM LACTATE AND CALCIUM CHLORIDE: 600; 310; 30; 20 INJECTION, SOLUTION INTRAVENOUS at 02:53

## 2020-06-15 RX ADMIN — PIPERACILLIN AND TAZOBACTAM 3.38 G: 3; .375 INJECTION, POWDER, LYOPHILIZED, FOR SOLUTION INTRAVENOUS at 16:26

## 2020-06-15 RX ADMIN — SODIUM CHLORIDE, POTASSIUM CHLORIDE, SODIUM LACTATE AND CALCIUM CHLORIDE: 600; 310; 30; 20 INJECTION, SOLUTION INTRAVENOUS at 16:30

## 2020-06-15 RX ADMIN — FAMOTIDINE 20 MG: 10 INJECTION, SOLUTION INTRAVENOUS at 21:56

## 2020-06-15 RX ADMIN — NYSTATIN 500000 UNITS: 100000 SUSPENSION ORAL at 13:00

## 2020-06-15 RX ADMIN — NYSTATIN 500000 UNITS: 100000 SUSPENSION ORAL at 09:40

## 2020-06-15 RX ADMIN — FAMOTIDINE 20 MG: 10 INJECTION, SOLUTION INTRAVENOUS at 09:40

## 2020-06-15 RX ADMIN — DIPHENHYDRAMINE HYDROCHLORIDE 25 MG: 50 INJECTION, SOLUTION INTRAMUSCULAR; INTRAVENOUS at 00:51

## 2020-06-15 RX ADMIN — KETOROLAC TROMETHAMINE 15 MG: 15 INJECTION, SOLUTION INTRAMUSCULAR; INTRAVENOUS at 21:56

## 2020-06-15 RX ADMIN — CLONAZEPAM 0.5 MG: 0.5 TABLET ORAL at 09:40

## 2020-06-15 RX ADMIN — KETOROLAC TROMETHAMINE 15 MG: 15 INJECTION, SOLUTION INTRAMUSCULAR; INTRAVENOUS at 04:17

## 2020-06-15 RX ADMIN — ACETAMINOPHEN 1000 MG: 500 TABLET ORAL at 09:40

## 2020-06-15 RX ADMIN — I.V. FAT EMULSION 100 ML: 20 EMULSION INTRAVENOUS at 17:14

## 2020-06-15 RX ADMIN — ACETAMINOPHEN 1000 MG: 500 TABLET ORAL at 17:15

## 2020-06-15 RX ADMIN — Medication 10 ML: at 21:57

## 2020-06-15 RX ADMIN — CYCLOBENZAPRINE HYDROCHLORIDE 5 MG: 5 TABLET, FILM COATED ORAL at 06:20

## 2020-06-15 RX ADMIN — PIPERACILLIN AND TAZOBACTAM 3.38 G: 3; .375 INJECTION, POWDER, LYOPHILIZED, FOR SOLUTION INTRAVENOUS at 06:20

## 2020-06-15 RX ADMIN — I.V. FAT EMULSION 100 ML: 20 EMULSION INTRAVENOUS at 06:21

## 2020-06-15 RX ADMIN — AMIODARONE HYDROCHLORIDE 200 MG: 200 TABLET ORAL at 09:40

## 2020-06-15 ASSESSMENT — PAIN SCALES - GENERAL
PAINLEVEL_OUTOF10: 7
PAINLEVEL_OUTOF10: 7
PAINLEVEL_OUTOF10: 3
PAINLEVEL_OUTOF10: 7
PAINLEVEL_OUTOF10: 4
PAINLEVEL_OUTOF10: 5

## 2020-06-15 NOTE — PROGRESS NOTES
General Surgery Progress Note            PATIENT NAME: Carlyle Guido       TODAY'S DATE: 6/15/2020, 5:57 AM      SUBJECTIVE:    Pt seen and examined. Afebrile. VSS. Denies N/V/F/C. NG tube to suction. Pain much better controlled. Pt feeling better overall this morning. UOP adequate.       OBJECTIVE:   VITALS:  /64   Pulse 58   Temp 98.2 °F (36.8 °C) (Oral)   Resp 16   Ht 5' 10\" (1.778 m)   Wt 181 lb 12.8 oz (82.5 kg)   SpO2 96%   BMI 26.09 kg/m²      INTAKE/OUTPUT:      Intake/Output Summary (Last 24 hours) at 6/15/2020 0557  Last data filed at 6/15/2020 0400  Gross per 24 hour   Intake 5560.9 ml   Output 2225 ml   Net 3335.9 ml       PHYSICAL EXAM  GEN: Alert, awake, oriented, NAD  HEENT: normocephalic, no scleral icterus, moist mucous membranes  CV: S1/S2  LUNG: no respiratory distress, no audible wheezing  ABDOMEN: soft, non-distended, no guarding or peritoneal signs, appropriately TTP around incision; drain with 375 cc/24hr serosanguinous fluid  EXTREMITIES: No edema or cyanosis    Data:  CBC with Differential:    Lab Results   Component Value Date    WBC 18.1 06/14/2020    RBC 4.02 06/14/2020    HGB 11.7 06/14/2020    HCT 37.0 06/14/2020     06/14/2020    MCV 92.0 06/14/2020    MCH 29.1 06/14/2020    MCHC 31.6 06/14/2020    RDW 13.3 06/14/2020    LYMPHOPCT 10 06/14/2020    MONOPCT 5 06/14/2020    BASOPCT 0 06/14/2020    MONOSABS 0.95 06/14/2020    LYMPHSABS 1.83 06/14/2020    EOSABS <0.03 06/14/2020    BASOSABS 0.05 06/14/2020    DIFFTYPE NOT REPORTED 06/14/2020     BMP:    Lab Results   Component Value Date     06/14/2020    K 4.1 06/14/2020     06/14/2020    CO2 27 06/14/2020    BUN 16 06/14/2020    LABALBU 2.5 06/14/2020    CREATININE 0.99 06/14/2020    CALCIUM 8.2 06/14/2020    GFRAA >60 06/14/2020    LABGLOM >60 06/14/2020    GLUCOSE 132 06/14/2020       Radiology Review:      CT Abdomen Pelvis W IV Contrast  Result Date: 6/12/2020  Large volume of pneumoperitoneum, more than expected for two weeks postoperative from hemicolectomy. Findings are concerning for leak or perforation elsewhere, possibly in upper abdomen given abnormal appearance of proximal small bowel. Ascites. Abdominal aorta measures 2.9 cm and is stable. Gallstones. Critical results were called by Dr. Sudheer Venegas to [address] on 6/12/2020 at 15:32. ASSESSMENT     POD#3 ex lap, extensive lysis of adhesions, repair of serosal/colonic tear, drainage of subhepatic abscess, protective diverting loop ileostomy creation       Plan  1. Okay for ice chips, popsicles, and sips with meds  2. Clamp NG tube. If tolerating clamp trial will remove later today and start on clear liquid diet. 3. Remove pandey  4. IVF hydration / Continue PPN. If patient tolerates clamp trial and NG removed will be able to advance to clear liquid diet and then further advance over the next day or so and provide nutrition. If that is the case we can d/c parenteral nutrition once he is consistently tolerating clear liquids and ready for advancement of diet. 5. Pain control - morphine PCA, flexeril and tylenol. 6. Continue Zosyn  7. Encourage up to chair today and frequent IS use  8. Abdominal binder  9. Wound care consult for stoma care/education  10. Hospitalist assistance with medical management. Electronically signed by Marco Condon DO  on 6/15/2020 at 5:57 AM     I Dr. Melissa De León saw and examined the patient. I have edited the above and agree with the above. NG is removed, doing well. Alejandra Wilson  Colorectal Surgery

## 2020-06-15 NOTE — CONSULTS
Ostomy Referral Progress Note      NAME:  Cyril Hart  MEDICAL RECORD NUMBER:  [id number]  AGE: 69 y.o. GENDER:  male  :  1950  TODAY'S DATE:  6/15/2020    Subjective     Cyril Hart is a 69 y.o. male referred by:   [x] Physician  [] Nursing  [] Other:     New loop ileostomy    Surgeon Dr. Rene Worley:        Diagnosis Date    Anxiety     Cancer ([facility]     basal cell forehead    [place] (hard of hearing)     Hx of cold sores     IBS (irritable bowel syndrome)     Kidney stone     Neuropathy     PTSD (post-traumatic stress disorder)     Wears glasses        MEDICATIONS:    No current facility-administered medications on file prior to encounter. Current Outpatient Medications on File Prior to Encounter   Medication Sig Dispense Refill    oxyCODONE (ROXICODONE) 5 MG immediate release tablet Take 5 mg by mouth every 8 hours as needed for Pain.  amiodarone (CORDARONE) 200 MG tablet Take 1 tablet by mouth daily 30 tablet 3    ibuprofen (ADVIL;MOTRIN) 600 MG tablet Take 0.5 tablets by mouth every 6 hours as needed for Pain 120 tablet 3    acetaminophen (TYLENOL) 500 MG tablet Take 2 tablets by mouth every 8 hours as needed for Pain or Fever 120 tablet 3    aspirin 81 MG chewable tablet Take 81 mg by mouth daily      clonazePAM (KLONOPIN) 0.5 MG tablet Take 0.5 mg by mouth 2 times daily as needed.        Multiple Vitamins-Minerals (THERAPEUTIC MULTIVITAMIN-MINERALS) tablet Take 1 tablet by mouth daily         ALLERGIES:    Allergies   Allergen Reactions    Prednisone Rash and Other (See Comments)     Fast heartrate         PAST SURGICAL HISTORY:    Past Surgical History:   Procedure Laterality Date    COLON SURGERY      COLONOSCOPY      2020    COSMETIC SURGERY      forehead scar revision    HEMICOLECTOMY N/A 2020    ABDOMINAL EXPLORATION WITH EXCISION OF TRANSVERSE COLON/RIGHT COLECTOMY performed by Crissy Pollard MD at [place] [address] N/A 2020 Flange Size (inches) 2 Inches 6/15/2020  9:52 AM   Stoma  Assessment Pink;Moist 6/15/2020  4:00 AM   Mucocutaneous Junction Intact 6/15/2020  4:00 AM   Peristomal Assessment Clean; Intact 6/15/2020  4:00 AM   Stool Color Green 6/15/2020  9:52 AM   Stool Appearance Loose 6/15/2020  9:52 AM   Stool Amount Small 6/15/2020  9:52 AM   Output (mL) 100 ml 6/15/2020  9:52 AM   Number of days: 3        Day 1 of stoma education today included GI anatomy of ileostomy, risk for dehydration, WILLIAM drain, introduction to ileostomy output, how/when to empty pouch, use of incentive spirometer, discharge plan for home care and what service they will provide versus what he will be expected to manage independently.          Intake/Output Summary (Last 24 hours) at 6/15/2020 0953  Last data filed at 6/15/2020 5153  Gross per 24 hour   Intake 5586 ml   Output 2465 ml   Net 3121 ml         Plan   Plan for Ostomy Care:        Ostomy Plan of Care  [x] Supplies left in room  [] Patient using home supplies  [x] Brand/supplies at bedside [employer]  [] Current pouching system     Current Diet: PN-Adult 2-in-1 Peripheral Line (custom)  Diet NPO Effective Now Exceptions are: Ice Chips, Sips with Meds, Popsicles  Dietician consult:  Yes    Discharge Plan:  Placement for patient upon discharge: home with support    Outpatient visit plan N/A  Supplies given Yes   Samples requested Yes    Referrals:  []   [] [facility]  [] Supplies  [] Other      Patient/Caregiver Teaching:  Written Instructions given to patient/family  Teaching provided:  [x] Reviewed GI and A&P        [] Supplies  [x] Pouch emptying      [] Manipulate closure  [x] Routine Care         [] Comment  [] Pouch maintenance           Level of patient/caregiver understanding able to:  [x] Indicates understanding       [x] Needs reinforcement  [] Unsuccessful      [] Verbal Understanding  [] Demonstrated understanding       [] No evidence of learning  [] Refused teaching

## 2020-06-15 NOTE — PROGRESS NOTES
[facility]    Progress Note    6/15/2020    1:54 PM    Name:   Cherise Grier  MRN:     [id number]     [place]:      [de-identified]   Room:   [address] [place] [address] Day:  3  Admit Date:  6/12/2020  1:06 PM    PCP:   Alex Knott MD  Code Status:  Full Code    Subjective:     C/C:   Chief Complaint   Patient presents with    Abdominal Pain    Bloated     Interval History Status: improved. Patient seen and examined this afternoon. No acute events overnight. NG tube is been removed. Patient is currently trying to tolerate a clear liquid diet. Continues to have good output via his ostomy. His wife is at bedside. He is afebrile. Vital signs are controlled. Pain is controlled. Brief History:     Mr. Cherise Grier is a 69year old gentleman who is presenting to [address] [place] [address] on 6/12/2020 for evaluation of worsening abdominal bloating and shortness of breath. He was evaluated by his PCP who ordered an x-ray which revealed significant air. Was instructed to come to the hospital for further evaluation. His history is notable for history of PTSD and recent ex lap with ascending and transverse colon resection and primary anastomosis on 5/28 with Dr. Irvin Kim. Patient was discharged from that recent hospitalization in good condition. Of note, he did have new onset atrial fibrillation for which she is currently on amiodarone to maintain sinus rhythm for. Upon arrival in evaluation to our emergency department on 6/12, patient underwent CT scan scanning which revealed pneumoperitoneum, worse than expected in the postoperative period from recent surgery. He underwent urgent surgery with Dr. Irvin Kim again  which resulted in exploratory laparotomy with lysis of adhesions and repair of serosal tears along with a drainage of a subhepatic abscess and creation of a loop ileostomy. Patient seen this morning the perioperative period.   He is postop day 1 from the above-noted procedure. He is in a significant of pain. He is requesting ice and popsicles. He is in sinus rhythm and physical exam does reveal a new ileostomy which is pink and fleshy in nature. No stools in the bag at this time. TPN is going to be initiated per surgery. However, we are giving him p.o. medications with sips of water at this time. He will be started on a PCA pump today awaiting monitor his respirations as this becomes initiated. TPN was started  NG tube was clamped  Patient tolerating oral intake  Stool in ostomy  NG tube removed. Trial of CLD today. Hopeful to discontinue parenteral nutrition if he is able to tolerate p.o. intake  PT/OT    Review of Systems:     Constitutional:  negative for chills, fevers, sweats  HENT: Reports resolution of nasal pain after NG has been removed  Respiratory:  negative for cough, dyspnea on exertion, shortness of breath, wheezing  Cardiovascular:  negative for chest pain, chest pressure/discomfort, lower extremity edema, palpitations  Gastrointestinal: reports improvement of abdominal pain; negative for abdominal pain, constipation, diarrhea, nausea, vomiting  Neurological:  negative for dizziness, headache    Medications: Allergies:     Allergies   Allergen Reactions    Prednisone Rash and Other (See Comments)     Fast heartrate         Current Meds:   Scheduled Meds:    fat emulsion  100 mL Intravenous Daily    cyclobenzaprine  5 mg Oral Q8H    clonazePAM  0.5 mg Oral BID    ketorolac  15 mg Intravenous Q6H    acetaminophen  1,000 mg Oral Q8H    nystatin  5 mL Oral 4x Daily    insulin lispro  0-6 Units Subcutaneous Q6H    amiodarone  200 mg Oral Daily    sodium chloride flush  10 mL Intravenous 2 times per day    famotidine (PEPCID) injection  20 mg Intravenous BID    piperacillin-tazobactam  3.375 g Intravenous Q8H    sodium chloride flush  10 mL Intravenous 2 times per day    enoxaparin  40 mg Subcutaneous Daily     Continuous good BP control at home; will watch (6/13)         Hyperlipidemia 6/13/2020 Yes    Tobacco use disorder 6/13/2020 Yes    Postoperative atrial fibrillation ([facility] 6/13/2020 Yes    Severe malnutrition ([facility] 6/13/2020 Yes                Plan:        1. Appreciate colorectal surgery input   2. Continue PCA pump for pain  3. Continue TPN/PPN; appreciate dietary assistance with dosing  4. NG tube was removed. Patient to trial clear liquid diet today. Goal will be to have adequate nutrition and stop parenteral nutrition prior to discharge  5. Currently on Zosyn  6. Continue incentive spirometry  7. Increase activity, PT OT  8. Continue amiodarone  9. Continue Klonopin for PTSD/anxiety  10. IV Pepcid for GI prophylaxis  11. Lovenox for DVT prophylaxis  12. Toradol, Tylenol for pain control  13. Dispo: Patient progressing well. Need to increase activity. Monitor p.o. intake over the next 24 to 48 hours.   Likely home with home care    33 Francy Redmond DO  6/15/2020  1:54 PM

## 2020-06-16 PROBLEM — I48.0 PAROXYSMAL ATRIAL FIBRILLATION (HCC): Status: ACTIVE | Noted: 2020-06-16

## 2020-06-16 PROBLEM — E88.09 HYPOALBUMINEMIA DUE TO PROTEIN-CALORIE MALNUTRITION (HCC): Status: ACTIVE | Noted: 2020-06-16

## 2020-06-16 PROBLEM — E83.51 HYPOCALCEMIA: Status: ACTIVE | Noted: 2020-06-16

## 2020-06-16 PROBLEM — D64.9 NORMOCYTIC NORMOCHROMIC ANEMIA: Status: ACTIVE | Noted: 2020-06-16

## 2020-06-16 PROBLEM — E46 HYPOALBUMINEMIA DUE TO PROTEIN-CALORIE MALNUTRITION (HCC): Status: ACTIVE | Noted: 2020-06-16

## 2020-06-16 PROBLEM — Z43.2: Status: ACTIVE | Noted: 2020-06-16

## 2020-06-16 PROBLEM — Z93.2 ILEOSTOMY STATUS (HCC): Status: ACTIVE | Noted: 2020-06-16

## 2020-06-16 LAB
ABSOLUTE EOS #: 0.62 K/UL (ref 0–0.44)
ABSOLUTE IMMATURE GRANULOCYTE: 0.09 K/UL (ref 0–0.3)
ABSOLUTE LYMPH #: 2.04 K/UL (ref 1.1–3.7)
ABSOLUTE MONO #: 0.85 K/UL (ref 0.1–1.2)
ALBUMIN SERPL-MCNC: 2.3 G/DL (ref 3.5–5.2)
ANION GAP SERPL CALCULATED.3IONS-SCNC: 8 MMOL/L (ref 9–17)
BASOPHILS # BLD: 1 % (ref 0–2)
BASOPHILS ABSOLUTE: 0.09 K/UL (ref 0–0.2)
BUN BLDV-MCNC: 13 MG/DL (ref 8–23)
BUN/CREAT BLD: 15 (ref 9–20)
CALCIUM SERPL-MCNC: 8 MG/DL (ref 8.6–10.4)
CHLORIDE BLD-SCNC: 104 MMOL/L (ref 98–107)
CO2: 26 MMOL/L (ref 20–31)
CREAT SERPL-MCNC: 0.89 MG/DL (ref 0.7–1.2)
CULTURE: ABNORMAL
CULTURE: ABNORMAL
DIFFERENTIAL TYPE: ABNORMAL
DIRECT EXAM: ABNORMAL
DIRECT EXAM: ABNORMAL
EOSINOPHILS RELATIVE PERCENT: 5 % (ref 1–4)
GFR AFRICAN AMERICAN: >60 ML/MIN
GFR NON-AFRICAN AMERICAN: >60 ML/MIN
GFR SERPL CREATININE-BSD FRML MDRD: ABNORMAL ML/MIN/{1.73_M2}
GFR SERPL CREATININE-BSD FRML MDRD: ABNORMAL ML/MIN/{1.73_M2}
GLUCOSE BLD-MCNC: 110 MG/DL (ref 70–99)
GLUCOSE BLD-MCNC: 110 MG/DL (ref 75–110)
GLUCOSE BLD-MCNC: 112 MG/DL (ref 75–110)
GLUCOSE BLD-MCNC: 113 MG/DL (ref 75–110)
HCT VFR BLD CALC: 39.3 % (ref 40.7–50.3)
HEMOGLOBIN: 12.4 G/DL (ref 13–17)
IMMATURE GRANULOCYTES: 1 %
LYMPHOCYTES # BLD: 15 % (ref 24–43)
Lab: ABNORMAL
MAGNESIUM: 1.9 MG/DL (ref 1.6–2.6)
MCH RBC QN AUTO: 29 PG (ref 25.2–33.5)
MCHC RBC AUTO-ENTMCNC: 31.6 G/DL (ref 28.4–34.8)
MCV RBC AUTO: 91.8 FL (ref 82.6–102.9)
MONOCYTES # BLD: 6 % (ref 3–12)
NRBC AUTOMATED: 0 PER 100 WBC
PDW BLD-RTO: 13.2 % (ref 11.8–14.4)
PHOSPHORUS: 3.4 MG/DL (ref 2.5–4.5)
PLATELET # BLD: 301 K/UL (ref 138–453)
PLATELET ESTIMATE: ABNORMAL
PMV BLD AUTO: 10.3 FL (ref 8.1–13.5)
POTASSIUM SERPL-SCNC: 4.1 MMOL/L (ref 3.7–5.3)
RBC # BLD: 4.28 M/UL (ref 4.21–5.77)
RBC # BLD: ABNORMAL 10*6/UL
SEG NEUTROPHILS: 72 % (ref 36–65)
SEGMENTED NEUTROPHILS ABSOLUTE COUNT: 10.16 K/UL (ref 1.5–8.1)
SODIUM BLD-SCNC: 138 MMOL/L (ref 135–144)
SPECIMEN DESCRIPTION: ABNORMAL
WBC # BLD: 13.9 K/UL (ref 3.5–11.3)
WBC # BLD: ABNORMAL 10*3/UL

## 2020-06-16 PROCEDURE — 6370000000 HC RX 637 (ALT 250 FOR IP): Performed by: STUDENT IN AN ORGANIZED HEALTH CARE EDUCATION/TRAINING PROGRAM

## 2020-06-16 PROCEDURE — 36415 COLL VENOUS BLD VENIPUNCTURE: CPT

## 2020-06-16 PROCEDURE — 94770 HC ETCO2 MONITOR DAILY: CPT

## 2020-06-16 PROCEDURE — 6370000000 HC RX 637 (ALT 250 FOR IP): Performed by: INTERNAL MEDICINE

## 2020-06-16 PROCEDURE — 94761 N-INVAS EAR/PLS OXIMETRY MLT: CPT

## 2020-06-16 PROCEDURE — 97165 OT EVAL LOW COMPLEX 30 MIN: CPT

## 2020-06-16 PROCEDURE — 2060000000 HC ICU INTERMEDIATE R&B

## 2020-06-16 PROCEDURE — 80069 RENAL FUNCTION PANEL: CPT

## 2020-06-16 PROCEDURE — 99232 SBSQ HOSP IP/OBS MODERATE 35: CPT | Performed by: INTERNAL MEDICINE

## 2020-06-16 PROCEDURE — 2500000003 HC RX 250 WO HCPCS: Performed by: STUDENT IN AN ORGANIZED HEALTH CARE EDUCATION/TRAINING PROGRAM

## 2020-06-16 PROCEDURE — 6360000002 HC RX W HCPCS: Performed by: STUDENT IN AN ORGANIZED HEALTH CARE EDUCATION/TRAINING PROGRAM

## 2020-06-16 PROCEDURE — 97116 GAIT TRAINING THERAPY: CPT

## 2020-06-16 PROCEDURE — 83735 ASSAY OF MAGNESIUM: CPT

## 2020-06-16 PROCEDURE — 97530 THERAPEUTIC ACTIVITIES: CPT

## 2020-06-16 PROCEDURE — 82947 ASSAY GLUCOSE BLOOD QUANT: CPT

## 2020-06-16 PROCEDURE — 97162 PT EVAL MOD COMPLEX 30 MIN: CPT

## 2020-06-16 PROCEDURE — 85025 COMPLETE CBC W/AUTO DIFF WBC: CPT

## 2020-06-16 PROCEDURE — 97535 SELF CARE MNGMENT TRAINING: CPT

## 2020-06-16 PROCEDURE — 6370000000 HC RX 637 (ALT 250 FOR IP): Performed by: COLON & RECTAL SURGERY

## 2020-06-16 PROCEDURE — 2580000003 HC RX 258: Performed by: STUDENT IN AN ORGANIZED HEALTH CARE EDUCATION/TRAINING PROGRAM

## 2020-06-16 RX ORDER — OXYCODONE HYDROCHLORIDE AND ACETAMINOPHEN 5; 325 MG/1; MG/1
2 TABLET ORAL EVERY 4 HOURS PRN
Status: DISCONTINUED | OUTPATIENT
Start: 2020-06-16 | End: 2020-06-17

## 2020-06-16 RX ORDER — OXYCODONE HYDROCHLORIDE AND ACETAMINOPHEN 5; 325 MG/1; MG/1
1 TABLET ORAL EVERY 4 HOURS PRN
Status: DISCONTINUED | OUTPATIENT
Start: 2020-06-16 | End: 2020-06-18 | Stop reason: HOSPADM

## 2020-06-16 RX ADMIN — AMIODARONE HYDROCHLORIDE 200 MG: 200 TABLET ORAL at 08:56

## 2020-06-16 RX ADMIN — ACETAMINOPHEN 1000 MG: 500 TABLET ORAL at 08:56

## 2020-06-16 RX ADMIN — NYSTATIN 500000 UNITS: 100000 SUSPENSION ORAL at 08:56

## 2020-06-16 RX ADMIN — FAMOTIDINE 20 MG: 10 INJECTION, SOLUTION INTRAVENOUS at 08:56

## 2020-06-16 RX ADMIN — ACETAMINOPHEN 1000 MG: 500 TABLET ORAL at 17:46

## 2020-06-16 RX ADMIN — ACETAMINOPHEN 1000 MG: 500 TABLET ORAL at 02:35

## 2020-06-16 RX ADMIN — KETOROLAC TROMETHAMINE 15 MG: 15 INJECTION, SOLUTION INTRAMUSCULAR; INTRAVENOUS at 22:02

## 2020-06-16 RX ADMIN — KETOROLAC TROMETHAMINE 15 MG: 15 INJECTION, SOLUTION INTRAMUSCULAR; INTRAVENOUS at 16:12

## 2020-06-16 RX ADMIN — CALCIUM GLUCONATE: 94 INJECTION, SOLUTION INTRAVENOUS at 17:45

## 2020-06-16 RX ADMIN — NYSTATIN 500000 UNITS: 100000 SUSPENSION ORAL at 22:04

## 2020-06-16 RX ADMIN — PIPERACILLIN AND TAZOBACTAM 3.38 G: 3; .375 INJECTION, POWDER, LYOPHILIZED, FOR SOLUTION INTRAVENOUS at 06:07

## 2020-06-16 RX ADMIN — CLONAZEPAM 0.5 MG: 0.5 TABLET ORAL at 08:56

## 2020-06-16 RX ADMIN — PIPERACILLIN AND TAZOBACTAM 3.38 G: 3; .375 INJECTION, POWDER, LYOPHILIZED, FOR SOLUTION INTRAVENOUS at 16:13

## 2020-06-16 RX ADMIN — CLONAZEPAM 0.5 MG: 0.5 TABLET ORAL at 22:04

## 2020-06-16 RX ADMIN — FAMOTIDINE 20 MG: 10 INJECTION, SOLUTION INTRAVENOUS at 22:02

## 2020-06-16 RX ADMIN — I.V. FAT EMULSION 100 ML: 20 EMULSION INTRAVENOUS at 17:45

## 2020-06-16 RX ADMIN — ENOXAPARIN SODIUM 40 MG: 40 INJECTION SUBCUTANEOUS at 08:56

## 2020-06-16 RX ADMIN — KETOROLAC TROMETHAMINE 15 MG: 15 INJECTION, SOLUTION INTRAMUSCULAR; INTRAVENOUS at 08:56

## 2020-06-16 RX ADMIN — NYSTATIN 500000 UNITS: 100000 SUSPENSION ORAL at 13:25

## 2020-06-16 RX ADMIN — KETOROLAC TROMETHAMINE 15 MG: 15 INJECTION, SOLUTION INTRAMUSCULAR; INTRAVENOUS at 02:35

## 2020-06-16 RX ADMIN — CYCLOBENZAPRINE HYDROCHLORIDE 5 MG: 5 TABLET, FILM COATED ORAL at 16:13

## 2020-06-16 RX ADMIN — Medication 10 ML: at 22:07

## 2020-06-16 RX ADMIN — CYCLOBENZAPRINE HYDROCHLORIDE 5 MG: 5 TABLET, FILM COATED ORAL at 06:06

## 2020-06-16 RX ADMIN — SODIUM CHLORIDE, POTASSIUM CHLORIDE, SODIUM LACTATE AND CALCIUM CHLORIDE: 600; 310; 30; 20 INJECTION, SOLUTION INTRAVENOUS at 09:13

## 2020-06-16 RX ADMIN — NYSTATIN 500000 UNITS: 100000 SUSPENSION ORAL at 17:46

## 2020-06-16 ASSESSMENT — PAIN SCALES - GENERAL
PAINLEVEL_OUTOF10: 7
PAINLEVEL_OUTOF10: 0
PAINLEVEL_OUTOF10: 4
PAINLEVEL_OUTOF10: 4
PAINLEVEL_OUTOF10: 5
PAINLEVEL_OUTOF10: 4
PAINLEVEL_OUTOF10: 3
PAINLEVEL_OUTOF10: 5
PAINLEVEL_OUTOF10: 4
PAINLEVEL_OUTOF10: 7

## 2020-06-16 ASSESSMENT — ENCOUNTER SYMPTOMS
COUGH: 0
SHORTNESS OF BREATH: 0
VOMITING: 0
NAUSEA: 0
ABDOMINAL PAIN: 1

## 2020-06-16 NOTE — PROGRESS NOTES
perioperative period. He is postop day 1 from the above-noted procedure. He is in a significant of pain. He is requesting ice and popsicles. He is in sinus rhythm and physical exam does reveal a new ileostomy which is pink and fleshy in nature. No stools in the bag at this time. TPN is going to be initiated per surgery. However, we are giving him p.o. medications with sips of water at this time. He will be started on a PCA pump today awaiting monitor his respirations as this becomes initiated. Surgery 5/28: The patient underwent  PROCEDURE:  Abdominal exploration, excision of the ascending colon and  transverse colon with cecal descending colon anastomosis. Repair of  small bowel superficial, seromuscular injury. Follow-up surgery 6/12 included:  He underwent urgent surgery with Dr. Shilpa Horner again  which resulted in exploratory laparotomy with lysis of adhesions and repair of serosal tears along with a drainage of a subhepatic abscess and creation of a loop ileostomy. TPN was started  NG tube was clamped  Patient tolerating oral intake  Stool in ostomy  NG tube removed. Trial of CLD today. Hopeful to discontinue parenteral nutrition if he is able to tolerate p.o. intake  PT/OT\"     CT:  Large volume of pneumoperitoneum, more than expected for two weeks   postoperative from hemicolectomy. Findings are concerning for anastomotic   leak. EKG 5/30:  Atrial fibrillation with rapid ventricular response  Nonspecific ST and T wave abnormality  Abnormal ECG  When compared with ECG of 22-MAY-2020 07:52,    Medications: Allergies:     Allergies   Allergen Reactions    Prednisone Rash and Other (See Comments)     Fast heartrate         Current Meds:   Scheduled Meds:    fat emulsion  100 mL Intravenous Daily    cyclobenzaprine  5 mg Oral Q8H    clonazePAM  0.5 mg Oral BID    ketorolac  15 mg Intravenous Q6H    acetaminophen  1,000 mg Oral Q8H    nystatin  5 mL Oral 4x Daily    insulin lispro

## 2020-06-16 NOTE — PROGRESS NOTES
(post-traumatic stress disorder), and Wears glasses. has a past surgical history that includes Colonoscopy; Cosmetic surgery; Tooth Extraction; hemicolectomy (N/A, 5/28/2020); Colon surgery; and laparotomy (N/A, 6/12/2020).    PER H&P: Idalia Gonzalez is a 69 y.o. Non-/non  male who presents with Abdominal Pain and Bloated   and is admitted to the hospital for the management of Intra-abdominal abscess ([facility] [address]    Patient presented to the emergency department with a chief complaint of abdominal bloating and shortness of breath. He recently had colorectal surgery 2 weeks ago with excision of transverse colon for abnormal polyps that were noted. His surgery was done at this hospital.  Imaging reveals a large volume of pneumoperitoneum concerning for anastomotic leak. General surgery was consulted by the emergency department and patient underwent an abdominal exploration with excision of the transverse colon. Postoperatively he is in significant pain, but is alert and oriented and answering questions appropriately. He is being admitted for further management of the anastomotic leak. Pt is s/p:   Date:  6/12/2020              Surgeon: Surgeon(s):  Cuate Saeed MD     Procedure: Procedure(s):  ABDOMINAL EXPLORATION WITH EXCISION OF TRANSVERSE COLON/RIGHT COLECTOMY        Restrictions  Restrictions/Precautions  Restrictions/Precautions: Up as Tolerated, Fall Risk  Position Activity Restriction  Other position/activity restrictions: full liquid diet, abd binder, encourage pt to be up in chair, WILLIAM drain/abd incision and covered with dressing, ileostomy, RUE IV      Subjective   General  Chart Reviewed: Yes  Patient assessed for rehabilitation services?: Yes  Family / Caregiver Present: No  Pain Assessment  *pt states he has 3/10 pain in abd incision and RN was notified.       Social/Functional History  Social/Functional History  Lives With: Spouse  Type of Home: House  Home Layout: One level  Home strength grossly 5/5   RUE Strength  Gross RUE Strength: WFL                   Plan   Plan  Times per week: 4-5x/week 1x/day as yaya   Current Treatment Recommendations: Strengthening, Balance Training, Neuromuscular Re-education, Self-Care / ADL, Endurance Training, Equipment Evaluation, Education, & procurement, Functional Mobility Training, Patient/Caregiver Education & Training, Home Management Training, Safety Education & Training, Pain Management                                                    AM-PAC Score   19          Goals  Short term goals  Time Frame for Short term goals: by discharge, pt to demo   Short term goal 1: bed mob tasks with use of rail as needed to MOD I. Short term goal 2: I with BUE HEP with use of hand outs as needed to maintain strength. Short term goal 3: UB ADL to set up and LB ADL to SUP with use of AD/AE as needed. Short term goal 4: ADL transfers and functional mob with AD as needed to MOD I/I. Short term goal 5: standing to SUP with AD as needed yaya > 8 mins as able to reduce falls with self care. Long term goals  Long term goal 1: Pt to be I with EC/WS and fall prevention tech and AE/DME recommendations with use of hand outs as needed. Patient Goals   Patient goals : go home!        Therapy Time   Individual Concurrent Group Co-treatment   Time In 0730(plus 10 min chart review/RN communication )         Time Out 0829         Minutes 59         Timed Code Treatment Minutes: [address], [place]

## 2020-06-16 NOTE — PLAN OF CARE
Pt appears withdrawn, has no appetite, slow to respond when questioned. Much encouragement and persuasion to use spirometer, get up to chair. Did improve intake with dinner. Reviewed use of PCA for pain control. Fall precautions in place and Call light in reach. Reviewed plan of care and reassurance given.

## 2020-06-16 NOTE — CARE COORDINATION
Discharge planning    Chart reviewed. New ileostomy. Met with patient to discuss potential for home care especially with new ileostomy and need for being set up with supplies. He was agreeable to look at home care list     CSM list provided and explained rating system. Will check back in am to see if agreeable. Did also discuss medicare rights. Appeal letter reviewed and signed.

## 2020-06-16 NOTE — PROGRESS NOTES
Physical Therapy    Facility/Department: [facility] PROGRESSIVE CARE  Initial Assessment    NAME: Fe Lay  : 1950  MRN: [id number]    Date of Service: 2020  ERIKA Salinas reports patient is medically stable for therapy treatment this date. Chart reviewed prior to treatment and patient is agreeable for therapy. All lines intact and patient positioned comfortably at end of treatment. All patient needs addressed prior to ending therapy session. Discharge Recommendations:  Home with Home health PT     Fe Lay is a 69 y.o. Non-/non  male who presents with Abdominal Pain and Bloated   and is admitted to the hospital for the management of Intra-abdominal abscess ([facility] [address]    Patient presented to the emergency department with a chief complaint of abdominal bloating and shortness of breath. He recently had colorectal surgery 2 weeks ago with excision of transverse colon for abnormal polyps that were noted. His surgery was done at this hospital.  Imaging reveals a large volume of pneumoperitoneum concerning for anastomotic leak. General surgery was consulted by the emergency department and patient underwent an abdominal exploration with excision of the transverse colon. Postoperatively he is in significant pain, but is alert and oriented and answering questions appropriately. He is being admitted for further management of the anastomotic leak. Assessment   Body structures, Functions, Activity limitations: Decreased functional mobility ; Decreased endurance;Decreased balance;Decreased cognition; Increased pain  Assessment: Pt. is in pain and frustrated with his situation. Recommending home with home PT.    Specific instructions for Next Treatment: progress gait  Prognosis: Excellent  Decision Making: Medium Complexity  PT Education: Goals;PT Role;Plan of Care  REQUIRES PT FOLLOW UP: Yes  Activity Tolerance  Activity Tolerance: Patient limited by pain;Treatment limited secondary to medical Good;-(IV pole)  Exercises  Comments: Edu. for moving LEs and UEs in bed as able. Pt. not very receptive to edu. re. importance of movement in bed, stating he is on a \"treadmill every day. \"  Explained one day in bed=3 days loss of strength. Plan   Plan  Times per week: 1-2x/day; 5-6days/wk  Specific instructions for Next Treatment: progress gait  Current Treatment Recommendations: Strengthening, ROM, Balance Training, Functional Mobility Training, Transfer Training, Home Exercise Program, Safety Education & Training, Patient/Caregiver Education & Training, Endurance Training, Gait Training  Safety Devices  Type of devices: All fall risk precautions in place, Gait belt, Patient at risk for falls, Call light within reach, Nurse notified, Chair alarm in place      Goals  Short term goals  Time Frame for Short term goals: 12 visits:  Short term goal 1: Pt. to be SBA for bed mobility from flat bed with no bedrail to simulate home. Short term goal 2: Pt. to be SBA for transfers with approp. AD  Short term goal 3: Pt. to be SBA for gait with approp. AD, 150ft. Short term goal 4: Pt. to have good standing dynamic balance  Short term goal 5: Pt. to safely negotiate 5 steps to prepare for entering his home at discharge. Patient Goals   Patient goals : wants to feel better       Therapy Time   Individual Concurrent Group Co-treatment   Time In 9925         Time Out 6535         Minutes 37              Treatment time:  23min.     Gladis Godinez, PT

## 2020-06-17 PROBLEM — C80.1 ADENOCARCINOMA (HCC): Status: ACTIVE | Noted: 2020-06-17

## 2020-06-17 LAB
ABSOLUTE EOS #: 1 K/UL (ref 0–0.44)
ABSOLUTE IMMATURE GRANULOCYTE: 0.11 K/UL (ref 0–0.3)
ABSOLUTE LYMPH #: 2.35 K/UL (ref 1.1–3.7)
ABSOLUTE MONO #: 1.06 K/UL (ref 0.1–1.2)
ALBUMIN SERPL-MCNC: 2.3 G/DL (ref 3.5–5.2)
ANION GAP SERPL CALCULATED.3IONS-SCNC: 8 MMOL/L (ref 9–17)
BASOPHILS # BLD: 0 % (ref 0–2)
BASOPHILS ABSOLUTE: 0.06 K/UL (ref 0–0.2)
BUN BLDV-MCNC: 14 MG/DL (ref 8–23)
BUN/CREAT BLD: 16 (ref 9–20)
CALCIUM SERPL-MCNC: 8.1 MG/DL (ref 8.6–10.4)
CHLORIDE BLD-SCNC: 107 MMOL/L (ref 98–107)
CO2: 25 MMOL/L (ref 20–31)
CREAT SERPL-MCNC: 0.87 MG/DL (ref 0.7–1.2)
DIFFERENTIAL TYPE: ABNORMAL
EOSINOPHILS RELATIVE PERCENT: 7 % (ref 1–4)
GFR AFRICAN AMERICAN: >60 ML/MIN
GFR NON-AFRICAN AMERICAN: >60 ML/MIN
GFR SERPL CREATININE-BSD FRML MDRD: ABNORMAL ML/MIN/{1.73_M2}
GFR SERPL CREATININE-BSD FRML MDRD: ABNORMAL ML/MIN/{1.73_M2}
GLUCOSE BLD-MCNC: 102 MG/DL (ref 75–110)
GLUCOSE BLD-MCNC: 105 MG/DL (ref 75–110)
GLUCOSE BLD-MCNC: 115 MG/DL (ref 75–110)
GLUCOSE BLD-MCNC: 116 MG/DL (ref 70–99)
GLUCOSE BLD-MCNC: 75 MG/DL (ref 75–110)
HCT VFR BLD CALC: 35.8 % (ref 40.7–50.3)
HEMOGLOBIN: 11.5 G/DL (ref 13–17)
IMMATURE GRANULOCYTES: 1 %
LYMPHOCYTES # BLD: 17 % (ref 24–43)
MAGNESIUM: 2 MG/DL (ref 1.6–2.6)
MCH RBC QN AUTO: 29 PG (ref 25.2–33.5)
MCHC RBC AUTO-ENTMCNC: 32.1 G/DL (ref 28.4–34.8)
MCV RBC AUTO: 90.2 FL (ref 82.6–102.9)
MONOCYTES # BLD: 7 % (ref 3–12)
NRBC AUTOMATED: 0 PER 100 WBC
PDW BLD-RTO: 13.1 % (ref 11.8–14.4)
PHOSPHORUS: 3.8 MG/DL (ref 2.5–4.5)
PLATELET # BLD: 296 K/UL (ref 138–453)
PLATELET ESTIMATE: ABNORMAL
PMV BLD AUTO: 10.5 FL (ref 8.1–13.5)
POTASSIUM SERPL-SCNC: 3.9 MMOL/L (ref 3.7–5.3)
RBC # BLD: 3.97 M/UL (ref 4.21–5.77)
RBC # BLD: ABNORMAL 10*6/UL
SEG NEUTROPHILS: 68 % (ref 36–65)
SEGMENTED NEUTROPHILS ABSOLUTE COUNT: 9.66 K/UL (ref 1.5–8.1)
SODIUM BLD-SCNC: 140 MMOL/L (ref 135–144)
WBC # BLD: 14.2 K/UL (ref 3.5–11.3)
WBC # BLD: ABNORMAL 10*3/UL

## 2020-06-17 PROCEDURE — 99232 SBSQ HOSP IP/OBS MODERATE 35: CPT | Performed by: INTERNAL MEDICINE

## 2020-06-17 PROCEDURE — 2580000003 HC RX 258: Performed by: STUDENT IN AN ORGANIZED HEALTH CARE EDUCATION/TRAINING PROGRAM

## 2020-06-17 PROCEDURE — 2500000003 HC RX 250 WO HCPCS: Performed by: STUDENT IN AN ORGANIZED HEALTH CARE EDUCATION/TRAINING PROGRAM

## 2020-06-17 PROCEDURE — 36415 COLL VENOUS BLD VENIPUNCTURE: CPT

## 2020-06-17 PROCEDURE — 83735 ASSAY OF MAGNESIUM: CPT

## 2020-06-17 PROCEDURE — 97530 THERAPEUTIC ACTIVITIES: CPT

## 2020-06-17 PROCEDURE — 6370000000 HC RX 637 (ALT 250 FOR IP): Performed by: STUDENT IN AN ORGANIZED HEALTH CARE EDUCATION/TRAINING PROGRAM

## 2020-06-17 PROCEDURE — 6360000002 HC RX W HCPCS: Performed by: STUDENT IN AN ORGANIZED HEALTH CARE EDUCATION/TRAINING PROGRAM

## 2020-06-17 PROCEDURE — 80069 RENAL FUNCTION PANEL: CPT

## 2020-06-17 PROCEDURE — 85025 COMPLETE CBC W/AUTO DIFF WBC: CPT

## 2020-06-17 PROCEDURE — 99213 OFFICE O/P EST LOW 20 MIN: CPT

## 2020-06-17 PROCEDURE — 6370000000 HC RX 637 (ALT 250 FOR IP): Performed by: COLON & RECTAL SURGERY

## 2020-06-17 PROCEDURE — 2060000000 HC ICU INTERMEDIATE R&B

## 2020-06-17 PROCEDURE — 82947 ASSAY GLUCOSE BLOOD QUANT: CPT

## 2020-06-17 PROCEDURE — 6370000000 HC RX 637 (ALT 250 FOR IP): Performed by: INTERNAL MEDICINE

## 2020-06-17 RX ADMIN — NYSTATIN 500000 UNITS: 100000 SUSPENSION ORAL at 21:59

## 2020-06-17 RX ADMIN — Medication 10 ML: at 21:58

## 2020-06-17 RX ADMIN — PIPERACILLIN AND TAZOBACTAM 3.38 G: 3; .375 INJECTION, POWDER, LYOPHILIZED, FOR SOLUTION INTRAVENOUS at 06:25

## 2020-06-17 RX ADMIN — SODIUM CHLORIDE, PRESERVATIVE FREE 10 ML: 5 INJECTION INTRAVENOUS at 22:16

## 2020-06-17 RX ADMIN — CLONAZEPAM 0.5 MG: 0.5 TABLET ORAL at 21:57

## 2020-06-17 RX ADMIN — ACETAMINOPHEN 1000 MG: 500 TABLET ORAL at 09:27

## 2020-06-17 RX ADMIN — Medication 10 ML: at 09:28

## 2020-06-17 RX ADMIN — NYSTATIN 500000 UNITS: 100000 SUSPENSION ORAL at 13:50

## 2020-06-17 RX ADMIN — AMIODARONE HYDROCHLORIDE 200 MG: 200 TABLET ORAL at 09:27

## 2020-06-17 RX ADMIN — KETOROLAC TROMETHAMINE 15 MG: 15 INJECTION, SOLUTION INTRAMUSCULAR; INTRAVENOUS at 09:27

## 2020-06-17 RX ADMIN — PIPERACILLIN AND TAZOBACTAM 3.38 G: 3; .375 INJECTION, POWDER, LYOPHILIZED, FOR SOLUTION INTRAVENOUS at 16:43

## 2020-06-17 RX ADMIN — CYCLOBENZAPRINE HYDROCHLORIDE 5 MG: 5 TABLET, FILM COATED ORAL at 06:25

## 2020-06-17 RX ADMIN — NYSTATIN 500000 UNITS: 100000 SUSPENSION ORAL at 09:27

## 2020-06-17 RX ADMIN — KETOROLAC TROMETHAMINE 15 MG: 15 INJECTION, SOLUTION INTRAMUSCULAR; INTRAVENOUS at 02:40

## 2020-06-17 RX ADMIN — PIPERACILLIN AND TAZOBACTAM 3.38 G: 3; .375 INJECTION, POWDER, LYOPHILIZED, FOR SOLUTION INTRAVENOUS at 00:27

## 2020-06-17 RX ADMIN — KETOROLAC TROMETHAMINE 15 MG: 15 INJECTION, SOLUTION INTRAMUSCULAR; INTRAVENOUS at 21:58

## 2020-06-17 RX ADMIN — NYSTATIN 500000 UNITS: 100000 SUSPENSION ORAL at 17:32

## 2020-06-17 RX ADMIN — FAMOTIDINE 20 MG: 10 INJECTION, SOLUTION INTRAVENOUS at 09:27

## 2020-06-17 RX ADMIN — KETOROLAC TROMETHAMINE 15 MG: 15 INJECTION, SOLUTION INTRAMUSCULAR; INTRAVENOUS at 15:03

## 2020-06-17 RX ADMIN — CYCLOBENZAPRINE HYDROCHLORIDE 5 MG: 5 TABLET, FILM COATED ORAL at 00:44

## 2020-06-17 RX ADMIN — ENOXAPARIN SODIUM 40 MG: 40 INJECTION SUBCUTANEOUS at 09:27

## 2020-06-17 RX ADMIN — FAMOTIDINE 20 MG: 10 INJECTION, SOLUTION INTRAVENOUS at 21:58

## 2020-06-17 RX ADMIN — ACETAMINOPHEN 1000 MG: 500 TABLET ORAL at 02:40

## 2020-06-17 RX ADMIN — SODIUM CHLORIDE, PRESERVATIVE FREE 10 ML: 5 INJECTION INTRAVENOUS at 09:28

## 2020-06-17 RX ADMIN — CLONAZEPAM 0.5 MG: 0.5 TABLET ORAL at 09:27

## 2020-06-17 RX ADMIN — OXYCODONE HYDROCHLORIDE AND ACETAMINOPHEN 2 TABLET: 5; 325 TABLET ORAL at 00:16

## 2020-06-17 RX ADMIN — ACETAMINOPHEN 1000 MG: 500 TABLET ORAL at 17:50

## 2020-06-17 ASSESSMENT — PAIN SCALES - GENERAL
PAINLEVEL_OUTOF10: 3
PAINLEVEL_OUTOF10: 0
PAINLEVEL_OUTOF10: 6
PAINLEVEL_OUTOF10: 8
PAINLEVEL_OUTOF10: 0
PAINLEVEL_OUTOF10: 6
PAINLEVEL_OUTOF10: 0
PAINLEVEL_OUTOF10: 3
PAINLEVEL_OUTOF10: 3
PAINLEVEL_OUTOF10: 6
PAINLEVEL_OUTOF10: 0

## 2020-06-17 ASSESSMENT — ENCOUNTER SYMPTOMS
SHORTNESS OF BREATH: 0
ABDOMINAL PAIN: 1
COUGH: 0
VOMITING: 0
NAUSEA: 0

## 2020-06-17 NOTE — PLAN OF CARE
Problem: Pain:  Goal: Pain level will decrease  Description: Pain level will decrease  6/17/2020 0256 by Trevor Chavez RN  Outcome: Ongoing  6/16/2020 1957 by Vineet Herbert RN  Outcome: Ongoing  6/16/2020 1937 by Trevor Chavez RN  Outcome: Ongoing  Goal: Control of acute pain  Description: Control of acute pain  6/17/2020 0256 by Trevor Chavez RN  Outcome: Ongoing  6/16/2020 1957 by Vineet Herbert RN  Outcome: Ongoing  6/16/2020 1937 by Trevor Chavez RN  Outcome: Ongoing  Goal: Control of chronic pain  Description: Control of chronic pain  6/17/2020 0256 by Trevor Chavez RN  Outcome: Ongoing  6/16/2020 1957 by Vineet Herbert RN  Outcome: Ongoing  6/16/2020 1937 by Trevor Chavez RN  Outcome: Ongoing  Goal: Patient's pain/discomfort is manageable  Description: Patient's pain/discomfort is manageable  6/17/2020 0256 by Trevor Chavez RN  Outcome: Ongoing  6/16/2020 1957 by Vineet Herbert RN  Outcome: Ongoing  6/16/2020 1937 by Trevor Chavez RN  Outcome: Ongoing     Problem: Falls - Risk of:  Goal: Will remain free from falls  Description: Will remain free from falls  6/17/2020 0256 by Trevor Chavez RN  Outcome: Ongoing  6/16/2020 1957 by Vineet Herbert RN  Outcome: Met This Shift  6/16/2020 1937 by Trevor Chavez RN  Outcome: Ongoing  Goal: Absence of physical injury  Description: Absence of physical injury  6/17/2020 0256 by Trevor Chavez RN  Outcome: Ongoing  6/16/2020 1957 by Vineet Herbert RN  Outcome: Met This Shift  6/16/2020 1937 by Trevor Chavez RN  Outcome: Ongoing     Problem: Nutrition  Goal: Optimal nutrition therapy  6/17/2020 0256 by Trevor Chavez RN  Outcome: Ongoing  6/16/2020 1957 by Vineet Herbert RN  Outcome: Ongoing  6/16/2020 1937 by Trevor Chavez RN  Outcome: Ongoing     Problem: Respiratory:  Goal: Ability to maintain normal respiratory secretions will improve  Description: Ability to maintain normal respiratory secretions will improve  6/17/2020 0256 by Vishnu Woodard RN  Outcome: Ongoing  6/16/2020 1957 by Deni Hong RN  Outcome: Met This Shift  6/16/2020 1937 by Vishnu Woodard RN  Outcome: Ongoing     Problem: Infection:  Goal: Will remain free from infection  Description: Will remain free from infection  6/17/2020 0256 by Vishnu Woodard RN  Outcome: Ongoing  6/16/2020 1957 by Deni Hong RN  Outcome: Ongoing  6/16/2020 1937 by Vishnu Woodard RN  Outcome: Ongoing     Problem: Safety:  Goal: Free from accidental physical injury  Description: Free from accidental physical injury  6/17/2020 0256 by Vishnu Woodard RN  Outcome: Ongoing  6/16/2020 1957 by Deni Hong RN  Outcome: Met This Shift  6/16/2020 1937 by Vishnu Woodard RN  Outcome: Ongoing  Goal: Free from intentional harm  Description: Free from intentional harm  6/17/2020 0256 by Vishnu Woodard RN  Outcome: Ongoing  6/16/2020 1957 by Deni Hong RN  Outcome: Met This Shift  6/16/2020 1937 by Vishnu Woodard RN  Outcome: Ongoing     Problem: Daily Care:  Goal: Daily care needs are met  Description: Daily care needs are met  6/17/2020 0256 by Vishnu Woodard RN  Outcome: Ongoing  6/16/2020 1957 by Deni Hong RN  Outcome: Met This Shift  6/16/2020 1937 by Vishnu Woodard RN  Outcome: Ongoing     Problem: Skin Integrity:  Goal: Skin integrity will stabilize  Description: Skin integrity will stabilize  6/17/2020 0256 by Vishnu Woodard RN  Outcome: Ongoing  6/16/2020 1957 by Deni Hong RN  Outcome: Ongoing  6/16/2020 1937 by Vishnu Woodard RN  Outcome: Ongoing     Problem: Discharge Planning:  Goal: Patients continuum of care needs are met  Description: Patients continuum of care needs are met  6/17/2020 0256 by Vishnu Woodard RN  Outcome: Ongoing  6/16/2020 1957 by Deni Hong RN  Outcome: Ongoing  6/16/2020 1937 by Vishnu Woodard RN  Outcome: Ongoing     Problem: Nutritional:  Goal: Nutritional status

## 2020-06-17 NOTE — PROGRESS NOTES
Dr. Javier Zaldivar rounded and updated on patient. TPN weaned off. Called Dr. Pedro Mancuso and he is okay with discharge from his standpoint. Plans for discharge tomorrow per Dr. Javier Zaldivar. Wife updated.

## 2020-06-17 NOTE — PROGRESS NOTES
hypertension [I10] 06/13/2020    Hyperlipidemia [E78.5] 06/13/2020    Pneumoperitoneum [K66.8] 06/13/2020    Severe malnutrition ([address] [E43] 06/13/2020    Intra-abdominal abscess (HCC) [K65.1] 06/12/2020    PTSD (post-traumatic stress disorder) [F43.10]     Neuropathy [G62.9]        Plan:        Pain control  Nutritional support  Diet as tolerated  Activity as tolerated  Stoma care  Blood Pressure - Monitor and control  Correct electrolyte abnormalities  Smoking cessation / education   Respiratory Therapy and Bronchodilators prn   A. fib: Rate control,   Anticoagulation  per cardiology:  Dr Reg Ram antibiotic evaluation  Will discharge when arrangements complete and ok with other services.   Follow-up with PCP in one week, Criselda Garza MD  Notify PCP of discharge   Surgical evaluation and follow-up with Dr. Anny Sawyer   Med rec done  ELIANA done  DCP 36 minutes plus    IP CONSULT TO CRITICAL CARE  IP CONSULT  S Rob Price DO  6/17/2020  11:17 AM

## 2020-06-17 NOTE — DISCHARGE INSTR - COC
control  Correct electrolyte abnormalities  Smoking cessation / education   Respiratory Therapy and Bronchodilators prn   A. fib: Rate control,   Anticoagulation  and f/u per cardiology:  Dr Maday Kinsey   Amiodarone  Discharge planning  Will discharge when arrangements complete and ok with other services. Follow-up with PCP in one week, Fatoumata Bryson MD  Notify PCP of discharge   Surgical evaluation and follow-up with Dr. Radha Barker       Pain control  Nutritional support  Diet as tolerated  Activity as tolerated  Stoma care  Blood Pressure - Monitor and control  Correct electrolyte abnormalities  Smoking cessation / education   Respiratory Therapy and Bronchodilators prn   A. fib: Rate control  Anticoagulation  per cardiology:  Dr Maday Kinsey   Surgical evaluation and follow-up with Dr. Radha Barker     Physician Certification: I certify the above information and transfer of Kelsi Daley  is necessary for the continuing treatment of the diagnosis listed and that he requires [address] for less 30 days. Update Admission H&P:   Principal Problem:    Intra-abdominal abscess (HCC)  Active Problems:    Neutrophilic leukocytosis    Postoperative atrial fibrillation (HCC)    PTSD (post-traumatic stress disorder)    Neuropathy    Essential hypertension    Hyperlipidemia    Tobacco use disorder    Pneumoperitoneum    Severe malnutrition (HCC)    Ileostomy status (HCC)    Hypocalcemia    Hypoalbuminemia due to protein-calorie malnutrition (HCC)    Normocytic normochromic anemia    Paroxysmal atrial fibrillation ([facility] [address]  Resolved Problems:    * No resolved hospital problems.  *       PHYSICIAN SIGNATURE:  Electronically signed by Wilmer Ridley DO on 6/17/20 at 11:13 AM EDT

## 2020-06-17 NOTE — PROGRESS NOTES
[facility]  Follow Up      NAME:  Sara Bustamante  MEDICAL RECORD NUMBER:  [id number]  AGE: 69 y.o. GENDER: male  : 1950  TODAY'S DATE:  2020    Ostomy education provided this visit including step by step review on how to properly drain the pouch and manipulate pouch closure, how and when to change the pouch, post op ostomy expectations including removal of bridge, output expectations, nutrition and fluid needs, prevention and mgmt of dehydration with ileostomy, and contact information provided in case needs should arise. All questions answered. Total time spent with the patient: 45minutes.

## 2020-06-17 NOTE — PROGRESS NOTES
Nutrition Assessment    Type and Reason for Visit: Reassess    Nutrition Recommendations:   1. Suggest continuing on low fiber diet. 2. Consider changing to Ensure Enlive ONS, x 2/day. 3. PN Order d/c'd, being weaned off it per Nursing Protocol. 4. Monitor PO intake and tolerance to diet. Nutrition Assessment: Pt continuing to improve from a nutritional standpoint, with tolerating being advanced to low fiber diet and being weaned off PN. Per Pt:  denies being in pain, no N/V, appetite is fair, consuming 50% of meals, refusing Huseyin ONS and had BM's x 4 yesterday. Spoke with Mikey Tracy RN:  PN order to be d/c'd today, wean off per protocol according to Dr. Alexandra Gomez, Colorectal Surgery. Suggest continuing on low fiber diet, with changing to Ensure Enlive ONS, x 2/day. Monitor PO intake & tolerance to diet. Malnutrition Assessment:  · Malnutrition Status: Meets the criteria for severe malnutrition  · Context: Acute illness or injury  · Findings of the 6 clinical characteristics of malnutrition (Minimum of 2 out of 6 clinical characteristics is required to make the diagnosis of moderate or severe Protein Calorie Malnutrition based on AND/ASPEN Guidelines):  1. Energy Intake-Less than or equal to 50% of estimated energy requirement, Greater than or equal to 5 days    2. Weight Loss-5% loss or greater, (2 weeks)  3. Fat Loss-Mild subcutaneous fat loss, Orbital, Fat overlying the ribs  4. Muscle Loss-Unable to assess  5. Fluid Accumulation-No significant fluid accumulation  6.   Strength-Not measured    Nutrition Risk Level: High    Nutrient Needs:  · Estimated Daily Total Kcal: 0320-1533 (~23-26 kcal/kg)  · Estimated Daily Protein (g):  g (1.2-1.4 g/kg IBW)  · Estimated Daily Total Fluid (ml/day): 1 mL/kcal    Nutrition Diagnosis:   · Problem: Severe malnutrition, In context of acute illness or injury  · Etiology: related to Acute injury/trauma, Insufficient energy/nutrient

## 2020-06-17 NOTE — PROGRESS NOTES
Occupational Therapy  Facility/Department: [facility] PROGRESSIVE CARE  Daily Treatment Note  NAME: Suzan Dawson  : 1950  MRN: [id number]    Date of Service: 2020    Discharge Recommendations:  Home with assist PRN, Home with Home health OT       Assessment   Performance deficits / Impairments: Decreased functional mobility ; Decreased endurance;Decreased ADL status; Decreased safe awareness;Decreased balance;Decreased high-level IADLs  Assessment: Skilled OT is indicated to increase overall I and safety awareness with ADL and functional tasks to return home with family assist as needed. Prognosis: Good  REQUIRES OT FOLLOW UP: Yes  Activity Tolerance  Activity Tolerance: Patient limited by fatigue;Patient limited by pain  Activity Tolerance: F-  Safety Devices  Safety Devices in place: Yes  Type of devices: Patient at risk for falls; Left in bed;Call light within reach;Nurse notified         Patient Diagnosis(es): The encounter diagnosis was Pneumoperitoneum. has a past medical history of Adenocarcinoma (HCC)  Colon, Anxiety, Cancer ([facility] [address] [place] (hard of hearing), Hx of cold sores, IBS (irritable bowel syndrome), Kidney stone, Neuropathy, PTSD (post-traumatic stress disorder), and Wears glasses. has a past surgical history that includes Colonoscopy; Cosmetic surgery; Tooth Extraction; hemicolectomy (N/A, 2020); Colon surgery; and laparotomy (N/A, 2020).     Restrictions  Restrictions/Precautions  Restrictions/Precautions: Up as Tolerated, Fall Risk  Position Activity Restriction  Other position/activity restrictions: full liquid diet, abd binder, encourage pt to be up in chair, WILLIAM drain/abd incision and covered with dressing, ileostomy, RUE IV    Subjective   General  Chart Reviewed: Yes  Patient assessed for rehabilitation services?: Yes  Response to previous treatment: Patient with no complaints from previous session  Family / Caregiver Present: No  Pt seemed agitated upon writer's arrival

## 2020-06-17 NOTE — PROGRESS NOTES
Q 4 hours prn  · Smoking cessation  · Pain control  · Labs: CBC and BMP in am  · DVT prophylaxis with low molecular weight heparin  · PT/OT  · PFTs as an outpatient  · Discussed with patient and wife  · Will follow with you    Electronically signed by     Ramón Duque MD on 6/17/2020 at 3:22 PM  Pulmonary Critical Care and Sleep Medicine,  [facility]  Cell: [phone]  Office: [phone]

## 2020-06-17 NOTE — PLAN OF CARE
Problem: Falls - Risk of:  Goal: Will remain free from falls  Description: Will remain free from falls  6/17/2020 1151 by Marco Antonio Meredith RN  Outcome: Ongoing  Pt fall risk, fall band present, falling star, safety alarm activated and in use as needed. Hourly rounding performed. Pt encouraged to use call light. See Yelena Allen fall risk assessment.

## 2020-06-18 ENCOUNTER — APPOINTMENT (OUTPATIENT)
Dept: GENERAL RADIOLOGY | Age: 70
DRG: 856 | End: 2020-06-18
Payer: MEDICARE

## 2020-06-18 VITALS
DIASTOLIC BLOOD PRESSURE: 68 MMHG | WEIGHT: 201.6 LBS | SYSTOLIC BLOOD PRESSURE: 124 MMHG | OXYGEN SATURATION: 98 % | HEIGHT: 70 IN | TEMPERATURE: 98.1 F | HEART RATE: 61 BPM | BODY MASS INDEX: 28.86 KG/M2 | RESPIRATION RATE: 16 BRPM

## 2020-06-18 LAB
ABSOLUTE EOS #: 0.91 K/UL (ref 0–0.44)
ABSOLUTE IMMATURE GRANULOCYTE: 0.07 K/UL (ref 0–0.3)
ABSOLUTE LYMPH #: 2.42 K/UL (ref 1.1–3.7)
ABSOLUTE MONO #: 1 K/UL (ref 0.1–1.2)
ALBUMIN SERPL-MCNC: 2.3 G/DL (ref 3.5–5.2)
ANION GAP SERPL CALCULATED.3IONS-SCNC: 8 MMOL/L (ref 9–17)
BASOPHILS # BLD: 1 % (ref 0–2)
BASOPHILS ABSOLUTE: 0.1 K/UL (ref 0–0.2)
BUN BLDV-MCNC: 13 MG/DL (ref 8–23)
BUN/CREAT BLD: 15 (ref 9–20)
CALCIUM SERPL-MCNC: 8 MG/DL (ref 8.6–10.4)
CHLORIDE BLD-SCNC: 107 MMOL/L (ref 98–107)
CO2: 23 MMOL/L (ref 20–31)
CREAT SERPL-MCNC: 0.86 MG/DL (ref 0.7–1.2)
DIFFERENTIAL TYPE: ABNORMAL
EOSINOPHILS RELATIVE PERCENT: 7 % (ref 1–4)
GFR AFRICAN AMERICAN: >60 ML/MIN
GFR NON-AFRICAN AMERICAN: >60 ML/MIN
GFR SERPL CREATININE-BSD FRML MDRD: ABNORMAL ML/MIN/{1.73_M2}
GFR SERPL CREATININE-BSD FRML MDRD: ABNORMAL ML/MIN/{1.73_M2}
GLUCOSE BLD-MCNC: 80 MG/DL (ref 75–110)
GLUCOSE BLD-MCNC: 82 MG/DL (ref 70–99)
GLUCOSE BLD-MCNC: 83 MG/DL (ref 75–110)
HCT VFR BLD CALC: 37.5 % (ref 40.7–50.3)
HEMOGLOBIN: 11.9 G/DL (ref 13–17)
IMMATURE GRANULOCYTES: 1 %
LYMPHOCYTES # BLD: 18 % (ref 24–43)
MAGNESIUM: 2 MG/DL (ref 1.6–2.6)
MCH RBC QN AUTO: 28.9 PG (ref 25.2–33.5)
MCHC RBC AUTO-ENTMCNC: 31.7 G/DL (ref 28.4–34.8)
MCV RBC AUTO: 91 FL (ref 82.6–102.9)
MONOCYTES # BLD: 7 % (ref 3–12)
NRBC AUTOMATED: 0 PER 100 WBC
PDW BLD-RTO: 13.1 % (ref 11.8–14.4)
PHOSPHORUS: 3.2 MG/DL (ref 2.5–4.5)
PLATELET # BLD: 279 K/UL (ref 138–453)
PLATELET ESTIMATE: ABNORMAL
PMV BLD AUTO: 10.8 FL (ref 8.1–13.5)
POTASSIUM SERPL-SCNC: 4.2 MMOL/L (ref 3.7–5.3)
RBC # BLD: 4.12 M/UL (ref 4.21–5.77)
RBC # BLD: ABNORMAL 10*6/UL
SEG NEUTROPHILS: 66 % (ref 36–65)
SEGMENTED NEUTROPHILS ABSOLUTE COUNT: 8.94 K/UL (ref 1.5–8.1)
SODIUM BLD-SCNC: 138 MMOL/L (ref 135–144)
WBC # BLD: 13.4 K/UL (ref 3.5–11.3)
WBC # BLD: ABNORMAL 10*3/UL

## 2020-06-18 PROCEDURE — 2500000003 HC RX 250 WO HCPCS: Performed by: STUDENT IN AN ORGANIZED HEALTH CARE EDUCATION/TRAINING PROGRAM

## 2020-06-18 PROCEDURE — 99239 HOSP IP/OBS DSCHRG MGMT >30: CPT | Performed by: INTERNAL MEDICINE

## 2020-06-18 PROCEDURE — 6370000000 HC RX 637 (ALT 250 FOR IP): Performed by: INTERNAL MEDICINE

## 2020-06-18 PROCEDURE — 71045 X-RAY EXAM CHEST 1 VIEW: CPT

## 2020-06-18 PROCEDURE — 6370000000 HC RX 637 (ALT 250 FOR IP): Performed by: STUDENT IN AN ORGANIZED HEALTH CARE EDUCATION/TRAINING PROGRAM

## 2020-06-18 PROCEDURE — 2580000003 HC RX 258: Performed by: STUDENT IN AN ORGANIZED HEALTH CARE EDUCATION/TRAINING PROGRAM

## 2020-06-18 PROCEDURE — 6360000002 HC RX W HCPCS: Performed by: STUDENT IN AN ORGANIZED HEALTH CARE EDUCATION/TRAINING PROGRAM

## 2020-06-18 PROCEDURE — 83735 ASSAY OF MAGNESIUM: CPT

## 2020-06-18 PROCEDURE — 36415 COLL VENOUS BLD VENIPUNCTURE: CPT

## 2020-06-18 PROCEDURE — 80069 RENAL FUNCTION PANEL: CPT

## 2020-06-18 PROCEDURE — 85025 COMPLETE CBC W/AUTO DIFF WBC: CPT

## 2020-06-18 PROCEDURE — 82947 ASSAY GLUCOSE BLOOD QUANT: CPT

## 2020-06-18 PROCEDURE — 6370000000 HC RX 637 (ALT 250 FOR IP): Performed by: COLON & RECTAL SURGERY

## 2020-06-18 RX ORDER — SODIUM CHLORIDE 9 MG/ML
INJECTION, SOLUTION INTRAVENOUS CONTINUOUS
Status: DISCONTINUED | OUTPATIENT
Start: 2020-06-18 | End: 2020-06-18 | Stop reason: HOSPADM

## 2020-06-18 RX ORDER — AMOXICILLIN AND CLAVULANATE POTASSIUM 875; 125 MG/1; MG/1
1 TABLET, FILM COATED ORAL 2 TIMES DAILY
Qty: 14 TABLET | Refills: 0 | Status: SHIPPED | OUTPATIENT
Start: 2020-06-18 | End: 2020-06-25

## 2020-06-18 RX ORDER — FAMOTIDINE 20 MG/1
20 TABLET, FILM COATED ORAL 2 TIMES DAILY
Status: DISCONTINUED | OUTPATIENT
Start: 2020-06-18 | End: 2020-06-18 | Stop reason: HOSPADM

## 2020-06-18 RX ADMIN — FAMOTIDINE 20 MG: 20 TABLET, FILM COATED ORAL at 15:28

## 2020-06-18 RX ADMIN — ACETAMINOPHEN 1000 MG: 500 TABLET ORAL at 12:35

## 2020-06-18 RX ADMIN — SODIUM CHLORIDE, PRESERVATIVE FREE 10 ML: 5 INJECTION INTRAVENOUS at 08:16

## 2020-06-18 RX ADMIN — CLONAZEPAM 0.5 MG: 0.5 TABLET ORAL at 08:16

## 2020-06-18 RX ADMIN — PIPERACILLIN AND TAZOBACTAM 3.38 G: 3; .375 INJECTION, POWDER, LYOPHILIZED, FOR SOLUTION INTRAVENOUS at 00:13

## 2020-06-18 RX ADMIN — AMIODARONE HYDROCHLORIDE 200 MG: 200 TABLET ORAL at 08:16

## 2020-06-18 RX ADMIN — CYCLOBENZAPRINE HYDROCHLORIDE 5 MG: 5 TABLET, FILM COATED ORAL at 06:47

## 2020-06-18 RX ADMIN — NYSTATIN 500000 UNITS: 100000 SUSPENSION ORAL at 08:16

## 2020-06-18 RX ADMIN — CYCLOBENZAPRINE HYDROCHLORIDE 5 MG: 5 TABLET, FILM COATED ORAL at 15:37

## 2020-06-18 RX ADMIN — Medication 10 ML: at 08:16

## 2020-06-18 RX ADMIN — ENOXAPARIN SODIUM 40 MG: 40 INJECTION SUBCUTANEOUS at 08:15

## 2020-06-18 RX ADMIN — NYSTATIN 500000 UNITS: 100000 SUSPENSION ORAL at 12:35

## 2020-06-18 RX ADMIN — FAMOTIDINE 20 MG: 10 INJECTION, SOLUTION INTRAVENOUS at 08:15

## 2020-06-18 RX ADMIN — KETOROLAC TROMETHAMINE 15 MG: 15 INJECTION, SOLUTION INTRAMUSCULAR; INTRAVENOUS at 03:31

## 2020-06-18 RX ADMIN — CYCLOBENZAPRINE HYDROCHLORIDE 5 MG: 5 TABLET, FILM COATED ORAL at 00:13

## 2020-06-18 RX ADMIN — PIPERACILLIN AND TAZOBACTAM 3.38 G: 3; .375 INJECTION, POWDER, LYOPHILIZED, FOR SOLUTION INTRAVENOUS at 06:47

## 2020-06-18 ASSESSMENT — ENCOUNTER SYMPTOMS
VOMITING: 0
NAUSEA: 0
ABDOMINAL PAIN: 1
COUGH: 0
SHORTNESS OF BREATH: 0

## 2020-06-18 ASSESSMENT — PAIN SCALES - GENERAL
PAINLEVEL_OUTOF10: 0
PAINLEVEL_OUTOF10: 4
PAINLEVEL_OUTOF10: 0
PAINLEVEL_OUTOF10: 0

## 2020-06-18 NOTE — PROGRESS NOTES
Findings are concerning for leak or perforation elsewhere, possibly in upper abdomen given abnormal appearance of proximal small bowel. Ascites. Abdominal aorta measures 2.9 cm and is stable. Gallstones. Critical results were called by Dr. Low Hernandez to [address] on 6/12/2020 at 15:32. ASSESSMENT     POD#6 ex lap, extensive lysis of adhesions, repair of serosal/colonic tear, drainage of subhepatic abscess, protective diverting loop ileostomy creation     Plan  1. General diet  2. Pain control - percocet, flexeril and tylenol  3. Transition to PO antibiotics at discharge - 1 week course keflex/flagyl  4. Encourage up to chair today and frequent IS use  5. Abdominal binder  6. Wound care consult for stoma care/education  7. Hospitalist assistance with medical management. 8. Pt doing well and okay from surgery standpoint for discharge today if the medical team agrees. Pt has the help he needs at home with his wife and family. The patient was asking if the surgery last Friday was necessary and we discussed this at lengths. With the large amount of free air that far out from surgery, we decided that the safest thing to do was to explore the abdomen as an anastomotic leak could have devastating consequences if not dealt with quickly. Fortunately the anastomosis and bowel was viable and did not have to be resected; however, we did find an abscess near by and a drain was left accordingly. Furthermore, to protect the anastomosis we diverted him upstream with a loop ileostomy which we intend to reverse in the future. Would keep that drain in place right now and plan to remove in the office. Pt understands and agrees with plans. Electronically signed by Isa Hinton DO  on 6/18/2020 at 7:22 AM       I Dr. Karoline Jaimes saw and examined the patient. I have edited the above and agree with the above. Alejandra Wilson  Colorectal Surgery

## 2020-06-18 NOTE — PROGRESS NOTES
Getting patient ready for discharge and he states a doctor had mentioned getting a chest x-ray before discharge? States he feels \"a little short of breath\" and abdomen feels \"full\". Dr. Virginia Patel updated. See order for CXR. Patient still wants to go home.

## 2020-06-18 NOTE — PROGRESS NOTES
[facility]    Progress Note    6/18/2020    12:58 PM    Name:   Jan Givens  MRN:     [id number]     [place]:      [de-identified]   Room:   [address] [place] [address] Day:  6  Admit Date:  6/12/2020  1:06 PM    PCP:   Josette Beckham MD  Code Status:  Full Code    Subjective:     C/C:   Chief Complaint   Patient presents with    Abdominal Pain    Bloated     Interval History Status:   Improving  Comfortable  Pain controlled  No complaints or distress  Discharge planning in progress    Data Base Updates:  WBC 13. 4High k/uL RBC 4. 12Low m/uL Hemoglobin 11.9Low     Calcium 8.0Low mg/dL   Alb 2.3Low     Glucose 82 mg/dL     BUN 13 mg/dL   CREATININE 0.86         Brief History:     As documented in the medical record:  \"Mr. Jan Givens is a 69year old gentleman who is presenting to [facility] on 6/12/2020 for evaluation of worsening abdominal bloating and shortness of breath. He was evaluated by his PCP who ordered an x-ray which revealed significant air. Was instructed to come to the hospital for further evaluation. His history is notable for history of PTSD and recent ex lap with ascending and transverse colon resection and primary anastomosis on 5/28 with Dr. Carlene Baldwin. Patient was discharged from that recent hospitalization in good condition. Of note, he did have new onset atrial fibrillation for which she is currently on amiodarone to maintain sinus rhythm for. Upon arrival in evaluation to our emergency department on 6/12, patient underwent CT scan scanning which revealed pneumoperitoneum, worse than expected in the postoperative period from recent surgery. He underwent urgent surgery with Dr. Carlene Baldwin again  which resulted in exploratory laparotomy with lysis of adhesions and repair of serosal tears along with a drainage of a subhepatic abscess and creation of a loop ileostomy. Patient seen this morning the perioperative period.   He is postop day 1 from the above-noted procedure. He is in a significant of pain. He is requesting ice and popsicles. He is in sinus rhythm and physical exam does reveal a new ileostomy which is pink and fleshy in nature. No stools in the bag at this time. TPN is going to be initiated per surgery. However, we are giving him p.o. medications with sips of water at this time. He will be started on a PCA pump today awaiting monitor his respirations as this becomes initiated. Surgery 5/28: The patient underwent  PROCEDURE:  Abdominal exploration, excision of the ascending colon and  transverse colon with cecal descending colon anastomosis. Repair of  small bowel superficial, seromuscular injury. Pathology revealed:  ASCENDING COLON AND RIGHT TRANSVERSE COLON, EXCISION:   -FOCAL ADENOCARCINOMA (SIZE 5.0 MM; INVADING INTO SUBMUCOSA) ARISING   IN A TUBULOVILLOUS ADENOMA WITH FOCI OF HIGH GRADE DYSPLASIA. The patient was readmitted with pneumoperitoneum  Follow-up surgery 6/12 included:  He underwent urgent surgery with Dr. Nia Garcia again  which resulted in exploratory laparotomy with lysis of adhesions and repair of serosal tears along with a drainage of a subhepatic abscess and creation of a loop ileostomy. TPN was started  NG tube was clamped  Patient tolerating oral intake  Stool in ostomy  NG tube removed. Trial of CLD today. Hopeful to discontinue parenteral nutrition if he is able to tolerate p.o. intake  PT/OT\"     CT:  Large volume of pneumoperitoneum, more than expected for two weeks   postoperative from hemicolectomy. Findings are concerning for anastomotic   leak. EKG 5/30:  Atrial fibrillation with rapid ventricular response  Nonspecific ST and T wave abnormality  Abnormal ECG  When compared with ECG of 22-MAY-2020 07:52,    The patient's condition was stabilized  He was transition to oral therapy  Discharge planning initiated    Medications: Allergies:     Allergies   Allergen Reactions    (24Hr): Intake/Output Summary (Last 24 hours) at 6/18/2020 1258  Last data filed at 6/18/2020 3064  Gross per 24 hour   Intake 1672 ml   Output 1535 ml   Net 137 ml         Review of Systems:     Review of Systems   Constitutional: Positive for activity change (Improving) and appetite change (Increased). Respiratory: Negative for cough and shortness of breath. Cardiovascular: Negative for chest pain and palpitations. Gastrointestinal: Positive for abdominal pain (Decreased). Negative for nausea and vomiting. Genitourinary: Negative for flank pain and hematuria. Physical Examination:        Physical Exam  Vitals signs reviewed. Constitutional:       General: He is not in acute distress. Appearance: He is not diaphoretic. HENT:      Head: Normocephalic. Nose: Nose normal.   Eyes:      General: No scleral icterus. Conjunctiva/sclera: Conjunctivae normal.   Neck:      Musculoskeletal: Neck supple. Trachea: No tracheal deviation. Cardiovascular:      Rate and Rhythm: Normal rate. Comments: HIRR  Pulmonary:      Effort: Pulmonary effort is normal. No respiratory distress. Breath sounds: Normal breath sounds. No wheezing or rales. Chest:      Chest wall: No tenderness. Abdominal:      General: Bowel sounds are normal. There is no distension. Palpations: Abdomen is soft. Tenderness: There is no abdominal tenderness. Comments: Ileostomy collecting brown stool   Musculoskeletal:         General: No tenderness. Skin:     General: Skin is warm and dry. Comments: His wound is clean and dry   Neurological:      Mental Status: He is alert and oriented to person, place, and time.        Labs:  Hematology:  Recent Labs     06/16/20  0532 06/17/20  0524 06/18/20  0554   WBC 13.9* 14.2* 13.4*   RBC 4.28 3.97* 4.12*   HGB 12.4* 11.5* 11.9*   HCT 39.3* 35.8* 37.5*   MCV 91.8 90.2 91.0   MCH 29.0 29.0 28.9   MCHC 31.6 32.1 31.7   RDW 13.2 13.1 13.1    296 279   MPV 10.3 10.5 10.8     Chemistry:  Recent Labs     06/16/20  0532 06/17/20  0553 06/18/20  0554    140 138   K 4.1 3.9 4.2    107 107   CO2 26 25 23   GLUCOSE 110* 116* 82   BUN 13 14 13   CREATININE 0.89 0.87 0.86   MG 1.9 2.0 2.0   ANIONGAP 8* 8* 8*   LABGLOM >60 >60 >60   GFRAA >60 >60 >60   CALCIUM 8.0* 8.1* 8.0*   PHOS 3.4 3.8 3.2     Recent Labs     06/16/20  0532  06/17/20  0202 06/17/20  0553 06/17/20  1129 06/17/20  1643 06/17/20  2207 06/18/20  0215 06/18/20  0554 06/18/20  0753   LABALBU 2.3*  --   --  2.3*  --   --   --   --  2.3*  --    POCGLU  --    < > 115*  --  105 102 75 83  --  80    < > = values in this interval not displayed. Radiology:    Ct Abdomen Pelvis W Iv Contrast Additional Contrast? None    Result Date: 6/12/2020  Large volume of pneumoperitoneum, more than expected for two weeks postoperative from hemicolectomy. Findings are concerning for leak or perforation elsewhere, possibly in upper abdomen given abnormal appearance of proximal small bowel. Ascites. Abdominal aorta measures 2.9 cm and is stable. Gallstones. Critical results were called by Dr. Priscila Christian to [address] on 6/12/2020 at 15:32. Xr Chest Portable    Result Date: 6/14/2020  Support tubes and lines as above. Mild cardiomegaly. No edema or focal consolidation. Old granulomatous changes are noted.        Assessment:        Primary Problem  Intra-abdominal abscess [facility])    Active Hospital Problems    Diagnosis Date Noted    Neutrophilic leukocytosis [I64.2] 06/15/2020     Priority: High    Postoperative atrial fibrillation ([facility] [I97.89, I48.91] 06/13/2020     Priority: Medium    Adenocarcinoma ([address]  Colon [C80.1] 06/17/2020    Ileostomy status ([address] [Z93.2] 06/16/2020    Hypocalcemia [E83.51] 06/16/2020    Hypoalbuminemia due to protein-calorie malnutrition ([facility] [E46] 06/16/2020    Normocytic normochromic anemia [D64.9] 06/16/2020    Paroxysmal atrial fibrillation ([facility] [I48.0]

## 2020-06-18 NOTE — PROGRESS NOTES
Discharge teaching and instructions  completed with patient and wife using teachback method. AVS reviewed. Patient and wife voiced understanding regarding prescriptions, follow up appointments, and care of self at home. Discharged home. All questions answered. Writer witnessed wife empty ileostomy bag and she did will. Education provided. Supplies provided to take home.

## 2020-06-18 NOTE — PROGRESS NOTES
Nutrition Education    Type and Reason for Visit: Reassess, Patient Education    Nutrition Assessment:  Pt/Pt's Wife were given a review of:  Ileostomy diet guidelines. RDN name & office # provided if have any questions @ home. · Verbally reviewed information with Patient/Wife  · Written educational materials provided. · Contact name and number provided.     Electronically signed by Lina Patrick RDN, CARLITOS on 6/18/20 at 3:55 PM EDT    Contact Number: [phone]

## 2020-06-18 NOTE — PROGRESS NOTES
Pulmonary Critical Care Progress Note  Carolyn Lincoln CNP / Dr. Nano Ken M.D. Patient seen for the follow up of COPD, respiratory failure, Intra-abdominal abscess ([address]     Subjective:  He is resting in bed with family present. He is somewhat argumentative/irritable. He denies significant shortness of breath, does complain of a cough with productive sputum. No complaints of chest pain. He is anxious for discharging. Length of stay: 6 Days    Examination:  Vitals: /63   Pulse 62   Temp 98.1 °F (36.7 °C) (Oral)   Resp 16   Ht 5' 10\" (1.778 m)   Wt 201 lb 9.6 oz (91.4 kg)   SpO2 96%   BMI 28.93 kg/m²     General appearance: alert, argumentative, family at bedside  Neck: No JVD  Lungs: Decreased air exchange + wheeze + rhonchi  Heart: regular rate and rhythm, S1, S2 normal, no gallop  Abdomen: Soft, non tender, + BS  Extremities: no cyanosis or clubbing.  No significant edema    LABs:  CBC:   Recent Labs     06/17/20  0524 06/18/20  0554   WBC 14.2* 13.4*   HGB 11.5* 11.9*   HCT 35.8* 37.5*    279     BMP:   Recent Labs     06/17/20  0553 06/18/20  0554    138   K 3.9 4.2   CO2 25 23   BUN 14 13   CREATININE 0.87 0.86   LABGLOM >60 >60   GLUCOSE 116* 82     LIVER PROFILE:  Recent Labs     06/17/20  0553 06/18/20  0554   LABALBU 2.3* 2.3*     Impression:  · Acute hypoxic respiratory failure  · Atelectasis  · Pneumoperitoneum s/p exploratory lap/lysis of adhesions/repair of serosal/colonic tear, drainage of subhepatic abscess/loop ileostomy 6/12/2020  · COPD/active smoking/75-pack-year smoking history  · Acute exacerbation of COPD/Asthma  · Acute Tracheo-bronchitis/Pneumonia/LRI  · Obesity/Obstructive sleep apnea    Recommendations:  · Oxygen by nasal cannula  · Continue IV Zosyn  · Albuterol Q 4 hours prn  · Start Bevespi, would recommend patient continuing bronchodilators following discharge  · IVF  · Mycostatin swish and swallow  · Pain control   · PT/OT  · DVT prophylaxis with

## 2020-06-24 NOTE — OP NOTE
complication, and taken to the PACU in stable condition. All sponge and instrument counts were correct at the end of the procedure and the patient tolerated the procedure well. Dr. Jennifer Sanchez was present and participating for all portions of the procedure. Electronically signed by Gwenda Dance, DO on 6/24/2020 at 1:52 PM       I Dr. Jennifer Sanchez was present throughout and performed the entire procedure. Alejandra Wilson  Colorectal Surgery

## 2020-08-07 ENCOUNTER — HOSPITAL ENCOUNTER (OUTPATIENT)
Dept: PREADMISSION TESTING | Age: 70
Discharge: HOME | End: 2020-08-11
Payer: MEDICARE

## 2020-08-07 ENCOUNTER — HOSPITAL ENCOUNTER (OUTPATIENT)
Dept: PREADMISSION TESTING | Age: 70
Setting detail: SPECIMEN
Discharge: HOME | End: 2020-08-11
Payer: MEDICARE

## 2020-08-07 VITALS
RESPIRATION RATE: 16 BRPM | TEMPERATURE: 97.3 F | DIASTOLIC BLOOD PRESSURE: 71 MMHG | HEIGHT: 70 IN | OXYGEN SATURATION: 99 % | SYSTOLIC BLOOD PRESSURE: 141 MMHG | HEART RATE: 77 BPM | WEIGHT: 165.34 LBS | BODY MASS INDEX: 23.67 KG/M2

## 2020-08-07 LAB
ANION GAP SERPL CALCULATED.3IONS-SCNC: 13 MMOL/L (ref 9–17)
BUN BLDV-MCNC: 11 MG/DL (ref 8–23)
CHLORIDE BLD-SCNC: 102 MMOL/L (ref 98–107)
CO2: 25 MMOL/L (ref 20–31)
CREAT SERPL-MCNC: 0.91 MG/DL (ref 0.7–1.2)
GFR AFRICAN AMERICAN: >60 ML/MIN
GFR NON-AFRICAN AMERICAN: >60 ML/MIN
GFR SERPL CREATININE-BSD FRML MDRD: NORMAL ML/MIN/{1.73_M2}
GFR SERPL CREATININE-BSD FRML MDRD: NORMAL ML/MIN/{1.73_M2}
HCT VFR BLD CALC: 47.6 % (ref 40.7–50.3)
HEMOGLOBIN: 15.2 G/DL (ref 13–17)
MCH RBC QN AUTO: 28.7 PG (ref 25.2–33.5)
MCHC RBC AUTO-ENTMCNC: 31.9 G/DL (ref 28.4–34.8)
MCV RBC AUTO: 90 FL (ref 82.6–102.9)
NRBC AUTOMATED: 0 PER 100 WBC
PDW BLD-RTO: 14.6 % (ref 11.8–14.4)
PLATELET # BLD: 242 K/UL (ref 138–453)
PMV BLD AUTO: 10.2 FL (ref 8.1–13.5)
POTASSIUM SERPL-SCNC: 4.3 MMOL/L (ref 3.7–5.3)
RBC # BLD: 5.29 M/UL (ref 4.21–5.77)
SODIUM BLD-SCNC: 140 MMOL/L (ref 135–144)
WBC # BLD: 9.4 K/UL (ref 3.5–11.3)

## 2020-08-07 PROCEDURE — 85027 COMPLETE CBC AUTOMATED: CPT

## 2020-08-07 PROCEDURE — 82565 ASSAY OF CREATININE: CPT

## 2020-08-07 PROCEDURE — 36415 COLL VENOUS BLD VENIPUNCTURE: CPT

## 2020-08-07 PROCEDURE — U0003 INFECTIOUS AGENT DETECTION BY NUCLEIC ACID (DNA OR RNA); SEVERE ACUTE RESPIRATORY SYNDROME CORONAVIRUS 2 (SARS-COV-2) (CORONAVIRUS DISEASE [COVID-19]), AMPLIFIED PROBE TECHNIQUE, MAKING USE OF HIGH THROUGHPUT TECHNOLOGIES AS DESCRIBED BY CMS-2020-01-R: HCPCS

## 2020-08-07 PROCEDURE — 84520 ASSAY OF UREA NITROGEN: CPT

## 2020-08-07 PROCEDURE — 93005 ELECTROCARDIOGRAM TRACING: CPT | Performed by: ANESTHESIOLOGY

## 2020-08-07 PROCEDURE — 80051 ELECTROLYTE PANEL: CPT

## 2020-08-07 NOTE — H&P
History and Physical    Pt Name: Selwyn Osullivan  MRN: [id number]  YOB: 1950  Date of evaluation: 8/7/2020    PROGRESS NOTE:     THIS IS SUSANNA MOTA ,A 71 YO MALE PATIENT OF  WHO PRESENTS TO PRE-ADMISSION TESTING PRIOR TO AN ILEOSTOMY CLOSURE AFTER  EXPLORATORY LAPAROTOMY , LYSIS OF ADHESIONS AND DIVERTING LOOP ILEOSTOMY CREATION BY  ON 6/12/2020         FUNCTIONAL ASSESSMENT:  IS ABLE TO WALK 2 CITY BLOCKS, OR CLIMB 2 FLIGHTS OF STAIRS OR WALK UP A HILL  WITHOUT ANY SHORTNESS OF BREATH OR CHEST PAIN.        [x] Please see the [facility] PROGRESS NOTE OF 8/2/2020  by Dr. Marnie Arreola  which meets the criteria for the admission History and Physical  WILL BE SCANNED AND UPDATED INTO THE ELECTRONIC HEALTH RECORD    [address] [place] [address]  Electronically signed 8/7/2020 at 1:44 PM

## 2020-08-07 NOTE — PRE-PROCEDURE INSTRUCTIONS
[facility] ON Tuesday, August 11,2020  AM      When you arrive for surgery pull up to front of building & call [phone]   No visitors in the surgery area    Continue to take your home medications as you normally do up to and including the night before surgery with the exception of any blood thinning medications. Please stop any blood thinning medications as directed by your surgeon or prescribing physician. Failure to stop certain medications may interfere with your scheduled surgery. These may include:  Aspirin, Warfarin (Coumadin), Clopidogrel (Plavix), Ibuprofen (Motrin, Advil), Naproxen (Aleve), Meloxicam (Mobic), Celecoxib (Celebrex), Eliquis, Pradaxa, Xarelto, Effient, Fish Oil, Herbal supplements. Stop asa as directed by MD   .      Please take the following medication(s) the day of surgery with a small sip of water:  None        PREPARING FOR YOUR SURGERY:     Before surgery, you can play an important role in your own health. Because skin is not sterile, we need to be sure that your skin is as free of germs as possible before surgery by carefully washing before surgery. Preparing or prepping skin before surgery can reduce the risk of a surgical site infection.   Do not shave the area of your body where your surgery will be performed unless you received specific permission from your physician. You will need to shower at home the night before surgery and the morning of surgery with a special soap called chlorhexidine gluconate (CHG*). *Not to be used by people allergic to Chlorhexidine Gluconate (CHG). Following these instructions will help you be sure that your skin is clean before surgery. Instructions on cleaning your skin before surgery: The night before your surgery:      You will need to shower with warm water (not hot) and the CHG soap.  Use a clean wash cloth and a clean towel. Have clean clothes available to put on after the shower.        First wash your hair with regular shampoo. Rinse your hair and body thoroughly to remove the shampoo.  Wash your face and genital area (private parts) with your regular soap or water only. Thoroughly rinse your body with warm water from the neck down.  Turn water off to prevent rinsing the soap off too soon.  With a clean wet washcloth and half of the CHG soap in the bottle, lather your entire body from the neck down. Do not use CHG soap near your eyes or ears to avoid injury to those areas.  Wash thoroughly, paying special attention to the area where your surgery will be performed.  Wash your body gently for five (5) minutes. Avoid scrubbing your skin too hard.  Turn the water back on and rinse your body thoroughly.  Pat yourself dry with a clean, soft towel. Do not apply lotion, cream or powder.  Dress with clean freshly washed clothes. The morning of surgery:     Repeat shower following steps above - using remaining half of CHG soap in bottle. Patient Instructions:    Glaernestoine Shaina If you are having any type of anesthesia you are to have nothing to eat or drink after midnight the night before your surgery. This includes gum, hard candy, mints, water or smoking or chewing tobacco.  The only exception to this is a small sip of water to take with any morning dose of heart, blood pressure, or seizure medications. No alcoholic beverages for 24 hours prior to surgery.  Brush your teeth but do not swallow water.  Bring your eyeglasses and case with you. No contacts are to be worn the day of surgery. You also may bring your hearing aids. Most surgical procedures involving anesthesia will require that you remove your dentures prior to surgery.      · Do not wear any jewelry or body piercings day of surgery. Also, NO lotion, perfume or deodorant to be used the day of surgery.   No nail polish on the operative extremity (arm/leg surgeries)    · If you are staying overnight with us, please bring a small bag of necessary personal items.  Please wear loose, comfortable clothing. If you are potentially going to have a cast or brace bring clothing that will fit over them.  In case of illness - If you have cold or flu like symptoms (high fever, runny nose, sore throat, cough, etc.) rash, nausea, vomiting, loose stools, and/or recent contact with someone who has a contagious disease (chicken pox, measles, etc.) Please call your doctor before coming to the hospital.         Day of Surgery/Procedure:    As a patient at [place] [address] you can expect quality medical and nursing care that is centered on your individual needs. Our goal is to make your surgical experience as comfortable as possible    . Transportation After Your Surgery/Procedure: You will need a friend or family member to drive you home after your procedure. Your  must be 25years of age or older and able to sign off on your discharge instructions. A taxi cab or any other form of public transportation is not acceptable. Your friend or family member must stay at the hospital throughout your procedure. Someone must remain with you for the first 24 hours after your surgery if you receive anesthesia or medication. If you do not have someone to stay with you, your procedure may be cancelled.       If you have any other questions regarding your procedure or the day of surgery, please call [phone]      _________________________  ____________________________  Signature (Patient)              Signature (Provider) & date

## 2020-08-08 LAB
EKG ATRIAL RATE: 70 BPM
EKG P AXIS: 78 DEGREES
EKG P-R INTERVAL: 148 MS
EKG Q-T INTERVAL: 376 MS
EKG QRS DURATION: 84 MS
EKG QTC CALCULATION (BAZETT): 406 MS
EKG R AXIS: -22 DEGREES
EKG T AXIS: 52 DEGREES
EKG VENTRICULAR RATE: 70 BPM

## 2020-08-08 PROCEDURE — 93010 ELECTROCARDIOGRAM REPORT: CPT | Performed by: INTERNAL MEDICINE

## 2020-08-10 ENCOUNTER — ANESTHESIA EVENT (OUTPATIENT)
Dept: OPERATING ROOM | Age: 70
DRG: 331 | End: 2020-08-10
Payer: MEDICARE

## 2020-08-10 LAB — SARS-COV-2, NAA: NOT DETECTED

## 2020-08-11 ENCOUNTER — ANESTHESIA (OUTPATIENT)
Dept: OPERATING ROOM | Age: 70
DRG: 331 | End: 2020-08-11
Payer: MEDICARE

## 2020-08-11 ENCOUNTER — HOSPITAL ENCOUNTER (INPATIENT)
Age: 70
LOS: 4 days | Discharge: HOME HEALTH | DRG: 331 | End: 2020-08-15
Attending: COLON & RECTAL SURGERY | Admitting: COLON & RECTAL SURGERY
Payer: MEDICARE

## 2020-08-11 VITALS — DIASTOLIC BLOOD PRESSURE: 74 MMHG | TEMPERATURE: 96.1 F | OXYGEN SATURATION: 100 % | SYSTOLIC BLOOD PRESSURE: 137 MMHG

## 2020-08-11 PROBLEM — Z98.890 HISTORY OF REVERSAL OF ILEOSTOMY: Status: ACTIVE | Noted: 2020-08-11

## 2020-08-11 LAB
GLUCOSE BLD-MCNC: 114 MG/DL (ref 75–110)
GLUCOSE BLD-MCNC: 90 MG/DL (ref 75–110)

## 2020-08-11 PROCEDURE — 3700000000 HC ANESTHESIA ATTENDED CARE: Performed by: COLON & RECTAL SURGERY

## 2020-08-11 PROCEDURE — 2060000000 HC ICU INTERMEDIATE R&B

## 2020-08-11 PROCEDURE — 82947 ASSAY GLUCOSE BLOOD QUANT: CPT

## 2020-08-11 PROCEDURE — 99221 1ST HOSP IP/OBS SF/LOW 40: CPT | Performed by: FAMILY MEDICINE

## 2020-08-11 PROCEDURE — 3600000012 HC SURGERY LEVEL 2 ADDTL 15MIN: Performed by: COLON & RECTAL SURGERY

## 2020-08-11 PROCEDURE — 6360000002 HC RX W HCPCS: Performed by: COLON & RECTAL SURGERY

## 2020-08-11 PROCEDURE — 7100000001 HC PACU RECOVERY - ADDTL 15 MIN: Performed by: COLON & RECTAL SURGERY

## 2020-08-11 PROCEDURE — 7100000000 HC PACU RECOVERY - FIRST 15 MIN: Performed by: COLON & RECTAL SURGERY

## 2020-08-11 PROCEDURE — 0DBB0ZZ EXCISION OF ILEUM, OPEN APPROACH: ICD-10-PCS | Performed by: COLON & RECTAL SURGERY

## 2020-08-11 PROCEDURE — 3700000001 HC ADD 15 MINUTES (ANESTHESIA): Performed by: COLON & RECTAL SURGERY

## 2020-08-11 PROCEDURE — 2580000003 HC RX 258: Performed by: STUDENT IN AN ORGANIZED HEALTH CARE EDUCATION/TRAINING PROGRAM

## 2020-08-11 PROCEDURE — 2500000003 HC RX 250 WO HCPCS: Performed by: COLON & RECTAL SURGERY

## 2020-08-11 PROCEDURE — 2580000003 HC RX 258: Performed by: ANESTHESIOLOGY

## 2020-08-11 PROCEDURE — 6360000002 HC RX W HCPCS: Performed by: STUDENT IN AN ORGANIZED HEALTH CARE EDUCATION/TRAINING PROGRAM

## 2020-08-11 PROCEDURE — 6360000002 HC RX W HCPCS: Performed by: NURSE ANESTHETIST, CERTIFIED REGISTERED

## 2020-08-11 PROCEDURE — 2500000003 HC RX 250 WO HCPCS: Performed by: NURSE ANESTHETIST, CERTIFIED REGISTERED

## 2020-08-11 PROCEDURE — 3600000002 HC SURGERY LEVEL 2 BASE: Performed by: COLON & RECTAL SURGERY

## 2020-08-11 PROCEDURE — 6360000002 HC RX W HCPCS: Performed by: ANESTHESIOLOGY

## 2020-08-11 PROCEDURE — 6370000000 HC RX 637 (ALT 250 FOR IP): Performed by: STUDENT IN AN ORGANIZED HEALTH CARE EDUCATION/TRAINING PROGRAM

## 2020-08-11 PROCEDURE — 2709999900 HC NON-CHARGEABLE SUPPLY: Performed by: COLON & RECTAL SURGERY

## 2020-08-11 PROCEDURE — 2580000003 HC RX 258: Performed by: NURSE ANESTHETIST, CERTIFIED REGISTERED

## 2020-08-11 RX ORDER — LIDOCAINE HYDROCHLORIDE 20 MG/ML
INJECTION, SOLUTION EPIDURAL; INFILTRATION; INTRACAUDAL; PERINEURAL PRN
Status: DISCONTINUED | OUTPATIENT
Start: 2020-08-11 | End: 2020-08-11 | Stop reason: SDUPTHER

## 2020-08-11 RX ORDER — DOCUSATE SODIUM 100 MG/1
100 CAPSULE, LIQUID FILLED ORAL 2 TIMES DAILY PRN
Qty: 40 CAPSULE | Refills: 0 | Status: SHIPPED | OUTPATIENT
Start: 2020-08-11 | End: 2021-09-21

## 2020-08-11 RX ORDER — ONDANSETRON 2 MG/ML
INJECTION INTRAMUSCULAR; INTRAVENOUS PRN
Status: DISCONTINUED | OUTPATIENT
Start: 2020-08-11 | End: 2020-08-11 | Stop reason: SDUPTHER

## 2020-08-11 RX ORDER — SODIUM CHLORIDE 9 MG/ML
INJECTION, SOLUTION INTRAVENOUS CONTINUOUS
Status: DISCONTINUED | OUTPATIENT
Start: 2020-08-11 | End: 2020-08-11

## 2020-08-11 RX ORDER — MORPHINE SULFATE 4 MG/ML
4 INJECTION, SOLUTION INTRAMUSCULAR; INTRAVENOUS
Status: DISCONTINUED | OUTPATIENT
Start: 2020-08-11 | End: 2020-08-14

## 2020-08-11 RX ORDER — ONDANSETRON 4 MG/1
4 TABLET, FILM COATED ORAL EVERY 8 HOURS PRN
Qty: 30 TABLET | Refills: 0 | Status: SHIPPED | OUTPATIENT
Start: 2020-08-11 | End: 2021-09-21

## 2020-08-11 RX ORDER — ONDANSETRON 2 MG/ML
4 INJECTION INTRAMUSCULAR; INTRAVENOUS EVERY 6 HOURS PRN
Status: DISCONTINUED | OUTPATIENT
Start: 2020-08-11 | End: 2020-08-12

## 2020-08-11 RX ORDER — OXYCODONE HYDROCHLORIDE 5 MG/1
5 TABLET ORAL EVERY 4 HOURS PRN
Status: DISCONTINUED | OUTPATIENT
Start: 2020-08-11 | End: 2020-08-15 | Stop reason: HOSPADM

## 2020-08-11 RX ORDER — HYDROMORPHONE HCL 110MG/55ML
0.25 PATIENT CONTROLLED ANALGESIA SYRINGE INTRAVENOUS EVERY 5 MIN PRN
Status: COMPLETED | OUTPATIENT
Start: 2020-08-11 | End: 2020-08-11

## 2020-08-11 RX ORDER — SODIUM CHLORIDE 0.9 % (FLUSH) 0.9 %
10 SYRINGE (ML) INJECTION PRN
Status: DISCONTINUED | OUTPATIENT
Start: 2020-08-11 | End: 2020-08-11 | Stop reason: HOSPADM

## 2020-08-11 RX ORDER — SODIUM CHLORIDE, SODIUM LACTATE, POTASSIUM CHLORIDE, CALCIUM CHLORIDE 600; 310; 30; 20 MG/100ML; MG/100ML; MG/100ML; MG/100ML
INJECTION, SOLUTION INTRAVENOUS CONTINUOUS
Status: DISCONTINUED | OUTPATIENT
Start: 2020-08-11 | End: 2020-08-13

## 2020-08-11 RX ORDER — CEFAZOLIN SODIUM 2 G/50ML
2 SOLUTION INTRAVENOUS ONCE
Status: COMPLETED | OUTPATIENT
Start: 2020-08-11 | End: 2020-08-11

## 2020-08-11 RX ORDER — ROCURONIUM BROMIDE 10 MG/ML
INJECTION, SOLUTION INTRAVENOUS PRN
Status: DISCONTINUED | OUTPATIENT
Start: 2020-08-11 | End: 2020-08-11 | Stop reason: SDUPTHER

## 2020-08-11 RX ORDER — ONDANSETRON 2 MG/ML
4 INJECTION INTRAMUSCULAR; INTRAVENOUS
Status: DISCONTINUED | OUTPATIENT
Start: 2020-08-11 | End: 2020-08-11 | Stop reason: HOSPADM

## 2020-08-11 RX ORDER — HYDROCODONE BITARTRATE AND ACETAMINOPHEN 5; 325 MG/1; MG/1
1 TABLET ORAL EVERY 6 HOURS PRN
Qty: 20 TABLET | Refills: 0 | Status: SHIPPED | OUTPATIENT
Start: 2020-08-11 | End: 2020-08-16

## 2020-08-11 RX ORDER — SODIUM CHLORIDE 0.9 % (FLUSH) 0.9 %
10 SYRINGE (ML) INJECTION PRN
Status: DISCONTINUED | OUTPATIENT
Start: 2020-08-11 | End: 2020-08-15 | Stop reason: HOSPADM

## 2020-08-11 RX ORDER — FENTANYL CITRATE 50 UG/ML
INJECTION, SOLUTION INTRAMUSCULAR; INTRAVENOUS PRN
Status: DISCONTINUED | OUTPATIENT
Start: 2020-08-11 | End: 2020-08-11 | Stop reason: SDUPTHER

## 2020-08-11 RX ORDER — SODIUM CHLORIDE 0.9 % (FLUSH) 0.9 %
10 SYRINGE (ML) INJECTION EVERY 12 HOURS SCHEDULED
Status: DISCONTINUED | OUTPATIENT
Start: 2020-08-11 | End: 2020-08-15 | Stop reason: HOSPADM

## 2020-08-11 RX ORDER — SODIUM CHLORIDE 0.9 % (FLUSH) 0.9 %
10 SYRINGE (ML) INJECTION EVERY 12 HOURS SCHEDULED
Status: DISCONTINUED | OUTPATIENT
Start: 2020-08-11 | End: 2020-08-11 | Stop reason: HOSPADM

## 2020-08-11 RX ORDER — SODIUM CHLORIDE, SODIUM LACTATE, POTASSIUM CHLORIDE, CALCIUM CHLORIDE 600; 310; 30; 20 MG/100ML; MG/100ML; MG/100ML; MG/100ML
INJECTION, SOLUTION INTRAVENOUS CONTINUOUS PRN
Status: DISCONTINUED | OUTPATIENT
Start: 2020-08-11 | End: 2020-08-11 | Stop reason: SDUPTHER

## 2020-08-11 RX ORDER — ACETAMINOPHEN 500 MG
1000 TABLET ORAL EVERY 8 HOURS SCHEDULED
Status: DISCONTINUED | OUTPATIENT
Start: 2020-08-11 | End: 2020-08-15 | Stop reason: HOSPADM

## 2020-08-11 RX ORDER — POTASSIUM CHLORIDE 7.45 MG/ML
10 INJECTION INTRAVENOUS PRN
Status: DISCONTINUED | OUTPATIENT
Start: 2020-08-11 | End: 2020-08-15 | Stop reason: HOSPADM

## 2020-08-11 RX ORDER — POTASSIUM CHLORIDE 20 MEQ/1
40 TABLET, EXTENDED RELEASE ORAL PRN
Status: DISCONTINUED | OUTPATIENT
Start: 2020-08-11 | End: 2020-08-15 | Stop reason: HOSPADM

## 2020-08-11 RX ORDER — MAGNESIUM SULFATE IN WATER 40 MG/ML
2 INJECTION, SOLUTION INTRAVENOUS PRN
Status: DISCONTINUED | OUTPATIENT
Start: 2020-08-11 | End: 2020-08-15 | Stop reason: HOSPADM

## 2020-08-11 RX ORDER — MORPHINE SULFATE 2 MG/ML
2 INJECTION, SOLUTION INTRAMUSCULAR; INTRAVENOUS
Status: DISCONTINUED | OUTPATIENT
Start: 2020-08-11 | End: 2020-08-14

## 2020-08-11 RX ORDER — DEXAMETHASONE SODIUM PHOSPHATE 10 MG/ML
INJECTION INTRAMUSCULAR; INTRAVENOUS PRN
Status: DISCONTINUED | OUTPATIENT
Start: 2020-08-11 | End: 2020-08-11 | Stop reason: SDUPTHER

## 2020-08-11 RX ORDER — LIDOCAINE HYDROCHLORIDE 10 MG/ML
1 INJECTION, SOLUTION EPIDURAL; INFILTRATION; INTRACAUDAL; PERINEURAL
Status: DISCONTINUED | OUTPATIENT
Start: 2020-08-11 | End: 2020-08-11 | Stop reason: HOSPADM

## 2020-08-11 RX ORDER — FENTANYL CITRATE 50 UG/ML
25 INJECTION, SOLUTION INTRAMUSCULAR; INTRAVENOUS EVERY 5 MIN PRN
Status: DISCONTINUED | OUTPATIENT
Start: 2020-08-11 | End: 2020-08-11 | Stop reason: HOSPADM

## 2020-08-11 RX ORDER — CLONAZEPAM 0.5 MG/1
0.5 TABLET ORAL 2 TIMES DAILY PRN
Status: DISCONTINUED | OUTPATIENT
Start: 2020-08-11 | End: 2020-08-15 | Stop reason: HOSPADM

## 2020-08-11 RX ORDER — PROPOFOL 10 MG/ML
INJECTION, EMULSION INTRAVENOUS PRN
Status: DISCONTINUED | OUTPATIENT
Start: 2020-08-11 | End: 2020-08-11 | Stop reason: SDUPTHER

## 2020-08-11 RX ORDER — SODIUM CHLORIDE, SODIUM LACTATE, POTASSIUM CHLORIDE, CALCIUM CHLORIDE 600; 310; 30; 20 MG/100ML; MG/100ML; MG/100ML; MG/100ML
INJECTION, SOLUTION INTRAVENOUS CONTINUOUS
Status: DISCONTINUED | OUTPATIENT
Start: 2020-08-11 | End: 2020-08-11

## 2020-08-11 RX ADMIN — DEXAMETHASONE SODIUM PHOSPHATE 10 MG: 10 INJECTION INTRAMUSCULAR; INTRAVENOUS at 14:02

## 2020-08-11 RX ADMIN — ROCURONIUM BROMIDE 10 MG: 10 INJECTION, SOLUTION INTRAVENOUS at 14:17

## 2020-08-11 RX ADMIN — CLONAZEPAM 0.5 MG: 0.5 TABLET ORAL at 22:11

## 2020-08-11 RX ADMIN — FENTANYL CITRATE 50 MCG: 50 INJECTION, SOLUTION INTRAMUSCULAR; INTRAVENOUS at 14:30

## 2020-08-11 RX ADMIN — HYDROMORPHONE HYDROCHLORIDE 0.25 MG: 2 INJECTION, SOLUTION INTRAMUSCULAR; INTRAVENOUS; SUBCUTANEOUS at 16:10

## 2020-08-11 RX ADMIN — ONDANSETRON 4 MG: 2 INJECTION, SOLUTION INTRAMUSCULAR; INTRAVENOUS at 14:02

## 2020-08-11 RX ADMIN — HYDROMORPHONE HYDROCHLORIDE 0.25 MG: 2 INJECTION, SOLUTION INTRAMUSCULAR; INTRAVENOUS; SUBCUTANEOUS at 16:26

## 2020-08-11 RX ADMIN — MORPHINE SULFATE 2 MG: 2 INJECTION, SOLUTION INTRAMUSCULAR; INTRAVENOUS at 21:19

## 2020-08-11 RX ADMIN — HYDROMORPHONE HYDROCHLORIDE 0.25 MG: 2 INJECTION, SOLUTION INTRAMUSCULAR; INTRAVENOUS; SUBCUTANEOUS at 15:48

## 2020-08-11 RX ADMIN — FENTANYL CITRATE 25 MCG: 50 INJECTION, SOLUTION INTRAMUSCULAR; INTRAVENOUS at 15:37

## 2020-08-11 RX ADMIN — SUGAMMADEX 200 MG: 100 INJECTION, SOLUTION INTRAVENOUS at 14:55

## 2020-08-11 RX ADMIN — SODIUM CHLORIDE, POTASSIUM CHLORIDE, SODIUM LACTATE AND CALCIUM CHLORIDE: 600; 310; 30; 20 INJECTION, SOLUTION INTRAVENOUS at 22:13

## 2020-08-11 RX ADMIN — FENTANYL CITRATE 50 MCG: 50 INJECTION, SOLUTION INTRAMUSCULAR; INTRAVENOUS at 14:57

## 2020-08-11 RX ADMIN — METRONIDAZOLE 500 MG: 500 INJECTION, SOLUTION INTRAVENOUS at 14:07

## 2020-08-11 RX ADMIN — SODIUM CHLORIDE, POTASSIUM CHLORIDE, SODIUM LACTATE AND CALCIUM CHLORIDE: 600; 310; 30; 20 INJECTION, SOLUTION INTRAVENOUS at 16:51

## 2020-08-11 RX ADMIN — LIDOCAINE HYDROCHLORIDE 100 MG: 20 INJECTION, SOLUTION EPIDURAL; INFILTRATION; INTRACAUDAL; PERINEURAL at 13:50

## 2020-08-11 RX ADMIN — CEFAZOLIN SODIUM 2 G: 2 SOLUTION INTRAVENOUS at 14:02

## 2020-08-11 RX ADMIN — FENTANYL CITRATE 50 MCG: 50 INJECTION, SOLUTION INTRAMUSCULAR; INTRAVENOUS at 14:17

## 2020-08-11 RX ADMIN — HYDROMORPHONE HYDROCHLORIDE 0.25 MG: 2 INJECTION, SOLUTION INTRAMUSCULAR; INTRAVENOUS; SUBCUTANEOUS at 15:58

## 2020-08-11 RX ADMIN — ROCURONIUM BROMIDE 50 MG: 10 INJECTION, SOLUTION INTRAVENOUS at 13:50

## 2020-08-11 RX ADMIN — PROPOFOL 50 MG: 10 INJECTION, EMULSION INTRAVENOUS at 14:57

## 2020-08-11 RX ADMIN — FENTANYL CITRATE 25 MCG: 50 INJECTION, SOLUTION INTRAMUSCULAR; INTRAVENOUS at 14:10

## 2020-08-11 RX ADMIN — ACETAMINOPHEN 1000 MG: 500 TABLET ORAL at 21:08

## 2020-08-11 RX ADMIN — FENTANYL CITRATE 25 MCG: 50 INJECTION, SOLUTION INTRAMUSCULAR; INTRAVENOUS at 15:28

## 2020-08-11 RX ADMIN — MORPHINE SULFATE 4 MG: 4 INJECTION, SOLUTION INTRAMUSCULAR; INTRAVENOUS at 17:24

## 2020-08-11 RX ADMIN — PROPOFOL 150 MG: 10 INJECTION, EMULSION INTRAVENOUS at 13:50

## 2020-08-11 RX ADMIN — SODIUM CHLORIDE, POTASSIUM CHLORIDE, SODIUM LACTATE AND CALCIUM CHLORIDE: 600; 310; 30; 20 INJECTION, SOLUTION INTRAVENOUS at 12:21

## 2020-08-11 RX ADMIN — PROPOFOL 50 MG: 10 INJECTION, EMULSION INTRAVENOUS at 14:19

## 2020-08-11 RX ADMIN — FENTANYL CITRATE 75 MCG: 50 INJECTION, SOLUTION INTRAMUSCULAR; INTRAVENOUS at 13:50

## 2020-08-11 RX ADMIN — SODIUM CHLORIDE, POTASSIUM CHLORIDE, SODIUM LACTATE AND CALCIUM CHLORIDE: 600; 310; 30; 20 INJECTION, SOLUTION INTRAVENOUS at 13:56

## 2020-08-11 ASSESSMENT — PAIN SCALES - GENERAL
PAINLEVEL_OUTOF10: 10
PAINLEVEL_OUTOF10: 0
PAINLEVEL_OUTOF10: 10
PAINLEVEL_OUTOF10: 3
PAINLEVEL_OUTOF10: 1
PAINLEVEL_OUTOF10: 10
PAINLEVEL_OUTOF10: 4
PAINLEVEL_OUTOF10: 10
PAINLEVEL_OUTOF10: 8
PAINLEVEL_OUTOF10: 10

## 2020-08-11 ASSESSMENT — PULMONARY FUNCTION TESTS
PIF_VALUE: 16
PIF_VALUE: 15
PIF_VALUE: 18
PIF_VALUE: 14
PIF_VALUE: 2
PIF_VALUE: 3
PIF_VALUE: 16
PIF_VALUE: 25
PIF_VALUE: 20
PIF_VALUE: 17
PIF_VALUE: 17
PIF_VALUE: 7
PIF_VALUE: 16
PIF_VALUE: 16
PIF_VALUE: 42
PIF_VALUE: 17
PIF_VALUE: 0
PIF_VALUE: 2
PIF_VALUE: 15
PIF_VALUE: 2
PIF_VALUE: 2
PIF_VALUE: 7
PIF_VALUE: 14
PIF_VALUE: 17
PIF_VALUE: 19
PIF_VALUE: 19
PIF_VALUE: 13
PIF_VALUE: 0
PIF_VALUE: 14
PIF_VALUE: 17
PIF_VALUE: 1
PIF_VALUE: 2
PIF_VALUE: 15
PIF_VALUE: 16
PIF_VALUE: 16
PIF_VALUE: 14
PIF_VALUE: 19
PIF_VALUE: 17
PIF_VALUE: 15
PIF_VALUE: 17
PIF_VALUE: 18
PIF_VALUE: 15
PIF_VALUE: 0
PIF_VALUE: 2
PIF_VALUE: 0
PIF_VALUE: 3
PIF_VALUE: 2
PIF_VALUE: 2
PIF_VALUE: 19
PIF_VALUE: 16
PIF_VALUE: 1
PIF_VALUE: 17
PIF_VALUE: 19
PIF_VALUE: 3
PIF_VALUE: 14
PIF_VALUE: 15
PIF_VALUE: 16
PIF_VALUE: 17
PIF_VALUE: 28
PIF_VALUE: 2
PIF_VALUE: 17
PIF_VALUE: 1
PIF_VALUE: 17
PIF_VALUE: 16
PIF_VALUE: 17
PIF_VALUE: 19
PIF_VALUE: 2
PIF_VALUE: 15
PIF_VALUE: 15
PIF_VALUE: 3
PIF_VALUE: 19
PIF_VALUE: 2
PIF_VALUE: 2
PIF_VALUE: 15
PIF_VALUE: 52
PIF_VALUE: 19
PIF_VALUE: 17
PIF_VALUE: 15
PIF_VALUE: 3
PIF_VALUE: 15
PIF_VALUE: 15

## 2020-08-11 ASSESSMENT — PAIN DESCRIPTION - LOCATION
LOCATION: ABDOMEN
LOCATION: ABDOMEN

## 2020-08-11 ASSESSMENT — ENCOUNTER SYMPTOMS
SINUS PRESSURE: 0
CONSTIPATION: 0
ABDOMINAL PAIN: 0
VOICE CHANGE: 0
VOMITING: 0
BACK PAIN: 0
COUGH: 0
CHOKING: 0
CHEST TIGHTNESS: 0
DIARRHEA: 0
NAUSEA: 0
SHORTNESS OF BREATH: 0
RHINORRHEA: 0
WHEEZING: 0

## 2020-08-11 ASSESSMENT — PAIN DESCRIPTION - PAIN TYPE
TYPE: SURGICAL PAIN

## 2020-08-11 ASSESSMENT — PAIN DESCRIPTION - FREQUENCY: FREQUENCY: CONTINUOUS

## 2020-08-11 ASSESSMENT — PAIN DESCRIPTION - ONSET: ONSET: ON-GOING

## 2020-08-11 ASSESSMENT — LIFESTYLE VARIABLES: SMOKING_STATUS: 1

## 2020-08-11 ASSESSMENT — PAIN DESCRIPTION - DESCRIPTORS: DESCRIPTORS: ACHING

## 2020-08-11 ASSESSMENT — PAIN DESCRIPTION - PROGRESSION: CLINICAL_PROGRESSION: GRADUALLY WORSENING

## 2020-08-11 NOTE — H&P
TENDERNESS,  NO EDEMA. Labs:  Recent Labs     08/07/20  1407   HGB 15.2   HCT 47.6   WBC 9.4   MCV 90.0         K 4.3      CO2 25   BUN 11   CREATININE 0.91       Recent Labs     08/07/20  1240   COVID19 Not Detected       LEX MICHAEL  Electronically signed 8/11/2020 at 11:46 AM         in [facility] PRE-ADMIT TESTING          Signed            Show:Clear all  [x]Manual[x]Template[]Copied    Added by:  [x]OFELIA Redman - CNP    []Mariella for details  History and Physical     Pt Name: Maegan Antonio  MRN: [id number]  YOB: 1950  Date of evaluation: 8/7/2020     PROGRESS NOTE:      THIS IS SUSANNA MOTA ,A 68 YO MALE PATIENT OF  WHO PRESENTS TO PRE-ADMISSION TESTING PRIOR TO AN ILEOSTOMY CLOSURE AFTER  EXPLORATORY LAPAROTOMY , LYSIS OF ADHESIONS AND DIVERTING LOOP ILEOSTOMY CREATION BY  ON 6/12/2020            FUNCTIONAL ASSESSMENT:  IS ABLE TO WALK 2 CITY BLOCKS, OR CLIMB 2 FLIGHTS OF STAIRS OR WALK UP A HILL  WITHOUT ANY SHORTNESS OF BREATH OR CHEST PAIN. [x]?  Please see the [facility] PROGRESS NOTE OF 8/2/2020  by Dr. Chuyita Talbot  which meets the criteria for the admission History and Physical  WILL BE SCANNED AND UPDATED INTO THE ELECTRONIC HEALTH RECORD     [facility] [place]  Electronically signed 8/7/2020 at 1:44 PM                  Cosigned by:  Isai Junior MD at 8/8/2020 12:34 PM    Revision History   ADDENDUM:  THE NOTE ABOVE SHOULD READ PLEASE SEE [employer] NOTE OF 7/29/2020 INSTEAD OF 8/2/2020                                [facility] [place]   9/2/2020

## 2020-08-11 NOTE — ANESTHESIA POSTPROCEDURE EVALUATION
Department of Anesthesiology  Postprocedure Note    Patient: Joshua Ledezma  MRN: [id number]  YOB: 1950  Date of evaluation: 8/11/2020  Time:  4:05 PM     Procedure Summary     Date:  08/11/20 Room / Location:  [address] [place] [address] - INPATIENT    Anesthesia Start:  9376 Anesthesia Stop:  4417    Procedure:  ILEOSTOMY CLOSURE (N/A Abdomen) Diagnosis:  (DX UNWANTED ILEOSTOMY)    Surgeon:  Aziza Coon MD Responsible Provider:  Miladys Rodrigues MD    Anesthesia Type:  general ASA Status:  3          Anesthesia Type: No value filed. Lance Phase I: Lance Score: 9    Lance Phase II:      Last vitals: Reviewed and per EMR flowsheets.        Anesthesia Post Evaluation    Patient location during evaluation: PACU  Patient participation: complete - patient participated  Level of consciousness: awake  Airway patency: patent  Nausea & Vomiting: no nausea  Complications: no  Cardiovascular status: blood pressure returned to baseline  Respiratory status: acceptable  Hydration status: euvolemic

## 2020-08-11 NOTE — PROGRESS NOTES
.Patient transffered to room 17609. VS taken, telemetry placed and call light given/within reach. Patient A&O and given room orientation. Orders released/reviewed, initial assessment completed and navigator started. Patient on 2L NC. LR infusing per orders. Patients wife at bedside. Bed alarm in place. Abdominal dressing CDI.

## 2020-08-11 NOTE — ANESTHESIA PRE PROCEDURE
Department of Anesthesiology  Preprocedure Note       Name:  Solange Izquierdo   Age:  69 y.o.  :  1950                                          MRN:  [id number]         Date:  2020      Surgeon: Luz Wu):  Amy Mitchell MD    Procedure: Procedure(s):  ABDOMINAL EXPLORATION WITH EXCISION OF TRANSVERSE COLON/RIGHT COLECTOMY    Medications prior to admission:   Prior to Admission medications    Medication Sig Start Date End Date Taking? Authorizing Provider   ondansetron (ZOFRAN) 4 MG tablet Take 1 tablet by mouth every 8 hours as needed for Nausea or Vomiting 20   Lea Orchard, DO   docusate sodium (COLACE) 100 MG capsule Take 1 capsule by mouth 2 times daily as needed for Constipation 20   Lea Orchard, DO   HYDROcodone-acetaminophen (NORCO) 5-325 MG per tablet Take 1 tablet by mouth every 6 hours as needed for Pain for up to 5 days. 20  Lea Leona, DO   ibuprofen (ADVIL;MOTRIN) 600 MG tablet Take 0.5 tablets by mouth every 6 hours as needed for Pain 6/3/20   Mitzi Bundy MD   acetaminophen (TYLENOL) 500 MG tablet Take 2 tablets by mouth every 8 hours as needed for Pain or Fever 6/3/20   Mitzi Bundy MD   aspirin 81 MG chewable tablet Take 81 mg by mouth daily    Historical Provider, MD   clonazePAM (KLONOPIN) 0.5 MG tablet Take 0.5 mg by mouth 2 times daily as needed. Historical Provider, MD   Multiple Vitamins-Minerals (THERAPEUTIC MULTIVITAMIN-MINERALS) tablet Take 1 tablet by mouth daily    Historical Provider, MD       Current medications:    No current facility-administered medications for this visit.       Current Outpatient Medications   Medication Sig Dispense Refill    ondansetron (ZOFRAN) 4 MG tablet Take 1 tablet by mouth every 8 hours as needed for Nausea or Vomiting 30 tablet 0    docusate sodium (COLACE) 100 MG capsule Take 1 capsule by mouth 2 times daily as needed for Constipation 40 capsule 0    HYDROcodone-acetaminophen (NORCO) 5-325 MG per tablet Take 1 tablet by mouth every 6 hours as needed for Pain for up to 5 days. 20 tablet 0     Facility-Administered Medications Ordered in Other Visits   Medication Dose Route Frequency Provider Last Rate Last Dose    lactated ringers infusion   Intravenous Continuous Keo Narvaez  mL/hr at 08/11/20 1221      sodium chloride flush 0.9 % injection 10 mL  10 mL Intravenous 2 times per day Melvin Crenshaw MD        sodium chloride flush 0.9 % injection 10 mL  10 mL Intravenous PRN Melvin Crenshaw MD        lidocaine PF 1 % injection 1 mL  1 mL Intradermal Once PRN Melvin Crenshaw MD        ceFAZolin (ANCEF) 2 g in dextrose 3 % 50 mL IVPB (duplex)  2 g Intravenous Once Abed EVERTON Wilson MD        metronidazole (FLAGYL) 500 mg in NaCl 100 mL IVPB premix  500 mg Intravenous Once Abed EVERTON Wilson MD           Allergies:     Allergies   Allergen Reactions    Prednisone Rash and Other (See Comments)     Fast heartrate         Problem List:    Patient Active Problem List   Diagnosis Code    Colon polyps K63.5    Anxiety F41.9    Wears glasses Z97.3    PTSD (post-traumatic stress disorder) F43.10    Neuropathy G62.9    Intra-abdominal abscess ([address] K65.1    Essential hypertension I10    Hyperlipidemia E78.5    Tobacco use disorder F17.200    Pneumoperitoneum K66.8    Postoperative atrial fibrillation (HCC) I97.89, I48.91    Severe malnutrition (HCC) B55    Neutrophilic leukocytosis O64.2    Ileostomy status (HCC) Z93.2    Hypocalcemia E83.51    Hypoalbuminemia due to protein-calorie malnutrition (HCC) E46    Normocytic normochromic anemia D64.9    Paroxysmal atrial fibrillation (HCC) I48.0    Adenocarcinoma (HCC)  Colon C80.1    History of reversal of ileostomy Z98.890       Past Medical History:        Diagnosis Date    A-fib (HCC)     Adenocarcinoma (HCC)  Colon 6/17/2020    Anxiety     Cancer (HCC)     basal cell forehead    Colon polyps     [place] (hard of hearing)     Hx of cold sores     IBS (irritable bowel syndrome)     Kidney stone     Neuropathy     PTSD (post-traumatic stress disorder)     Wears glasses        Past Surgical History:        Procedure Laterality Date    COLON SURGERY      COLONOSCOPY      march 2020    COSMETIC SURGERY      forehead scar revision    HEMICOLECTOMY N/A 5/28/2020    ABDOMINAL EXPLORATION WITH EXCISION OF TRANSVERSE COLON/RIGHT COLECTOMY performed by Kandace Harmon MD at [place] [address] N/A 6/12/2020    LAPAROTOMY EXPLORATORY LYSIS OF ADHESIONS REPAIR OF SEROSAL TEARS DRAINAGE OF SUBHEPATIC ABSCESS LOOP ILEOSTOMY CREATION performed by Kandace Harmon MD at [place] [address].         Social History:    Social History     Tobacco Use    Smoking status: Current Every Day Smoker     Packs/day: 1.50    Smokeless tobacco: Never Used    Tobacco comment: smokes greater than 50 years   Substance Use Topics    Alcohol use: Never     Frequency: Never                                Ready to quit: Not Answered  Counseling given: Not Answered  Comment: smokes greater than 50 years      Vital Signs (Current): There were no vitals filed for this visit.                                            BP Readings from Last 3 Encounters:   08/11/20 104/63   08/07/20 (!) 141/71   06/18/20 124/68       NPO Status:                                                                                 BMI:   Wt Readings from Last 3 Encounters:   08/11/20 165 lb 5.5 oz (75 kg)   08/07/20 165 lb 5.5 oz (75 kg)   06/17/20 201 lb 9.6 oz (91.4 kg)     There is no height or weight on file to calculate BMI.    CBC:   Lab Results   Component Value Date    WBC 9.4 08/07/2020    RBC 5.29 08/07/2020    HGB 15.2 08/07/2020    HCT 47.6 08/07/2020    MCV 90.0 08/07/2020    RDW 14.6 08/07/2020     08/07/2020       CMP:   Lab Results   Component Value Date     08/07/2020    K 4.3 08/07/2020     08/07/2020    CO2 25 08/07/2020    BUN 11 08/07/2020    CREATININE 0.91 08/07/2020 GFRAA >60 08/07/2020    LABGLOM >60 08/07/2020    GLUCOSE 82 06/18/2020    PROT 5.2 06/14/2020    CALCIUM 8.0 06/18/2020    BILITOT 0.22 06/14/2020    ALKPHOS 63 06/14/2020    AST 17 06/14/2020    ALT 14 06/14/2020       POC Tests:   Recent Labs     08/11/20  1216   POCGLU 90       Coags:   Lab Results   Component Value Date    PROTIME 15.5 05/30/2020    INR 1.3 05/30/2020    APTT 31.0 05/30/2020       HCG (If Applicable): No results found for: PREGTESTUR, PREGSERUM, HCG, HCGQUANT     ABGs: No results found for: PHART, PO2ART, NWC8QUO, PRB4OEV, BEART, W9ERYACQ     Type & Screen (If Applicable):  No results found for: LABABO, LABRH    Drug/Infectious Status (If Applicable):  No results found for: HIV, HEPCAB    COVID-19 Screening (If Applicable):   Lab Results   Component Value Date    COVID19 Not Detected 08/07/2020         Anesthesia Evaluation  Patient summary reviewed and Nursing notes reviewed no history of anesthetic complications:   Airway: Mallampati: II  TM distance: >3 FB   Neck ROM: full  Mouth opening: > = 3 FB Dental: normal exam         Pulmonary:normal exam    (+) current smoker                           Cardiovascular:  Exercise tolerance: no interval change,   (+) dysrhythmias (went into a fib post surgery): atrial fibrillation,     (-) pacemaker, past MI, CAD, CABG/stent and  angina        Rate: normal           Beta Blocker:  Not on Beta Blocker         Neuro/Psych:   (+) psychiatric history:depression/anxiety             GI/Hepatic/Renal:   (+) renal disease: kidney stones,           Endo/Other:    (+) malignancy/cancer. Abdominal:           Vascular:                                          Anesthesia Plan      general     ASA 3     (Rsi)  Induction: intravenous. MIPS: Postoperative opioids intended and Prophylactic antiemetics administered. Anesthetic plan and risks discussed with patient. Use of blood products discussed with patient whom consented to blood products. Plan discussed with attending, surgical team and CRNA.     Attending anesthesiologist reviewed and agrees with Jhon Mckenna MD   8/11/2020

## 2020-08-11 NOTE — OP NOTE
Operative Note      Patient: Mary Grace Brooks  YOB: 1950  MRN: [id number]    Date of Procedure: 8/11/2020    Pre-Op Diagnosis: DX UNWANTED ILEOSTOMY    Post-Op Diagnosis: Same       Procedure(s): ILEOSTOMY CLOSURE    Surgeon(s):  Melissa Ojeda MD    Assistant:   Resident: Rodrigue Ace DO    Anesthesia: General    Estimated Blood Loss (mL): less than 50     Complications: None    Specimens:   None       Drains:   Ileostomy Ileostomy RLQ (Active)     Findings: reversal of loop ileostomy of RLQ, skin staple and packing used     Detailed Description of Procedure: Indications:  70 yo M w/ above pre-op diagnosis. Consent obtained for procedure and all question answered. Patient was brought back to the operative suite and placed in the supine position. A time out was completed confirming identification of proper patient, position, and procedure to be preformed. General anesthesia was induced and the patient was prepped and draped in normal sterile fashion. Preoperative antibiotics of ancef and flagyl were given.     Circumferential incision around the previous ileostomy was created via scalpel and deepened via electrocautery. Fascia and underlying muscle identified and divided via electrocautery. Peritoneum identified and divided via Metzenbaum scissors. Care was taken to free all adhesions from the existing ileostomy and ensure no damage or injury to the bowel was made. No obvious abnormalities noted. The ileostomy was then mobilized to allow at least 8 to 10 cm of length to be manipulated and exposed for anastomosis. Crotch stitch was placed approximately 6 to 7 cm distally making sure to bring the antimesenteric borders of the small bowel together. The proximal/distal ends of the ileum exposed at the previous ostomy site were grasped with Babcocks and a 55mm blue load WAI linear stapler was introduced into the lumends. Staple load was fired and anastomosis was complete and noted to be patent. The distal open end of the small bowel was then approximated with Babcocks and another linear WAI 55mm blue load stapler was used to transect the end of the opening of the anastomosis in order to complete the anastomosis. The anastomosis was palpated and noted to be adequately patent. The staple line was then reinforced with 4-0 interrupted silk in a Lembert fashion. Hemostasis ensured via electrocautery. Fascia was closed via 0-looped PDS and 1 Prolene in an interrupted fashion. Subcutaneous tissues irrigated with copious amounts of sterile saline. Skin approximated loosely w/ staples, packed with iodoform and bacitracin ointment applied. Wound covered with fluffs and ABD dressing.       All sponge and instrument counts were correct at the end of the procedure. The patient tolerated the procedure well, was awakened from anesthesia, and was transported to PACU in stable condition.      Dr. Conchita Cardozo was present for the entire procedure. Thank you,    Electronically signed by Gege Zhou DO on 8/11/2020 at 3:12 PM       I Dr. Conchita Cardozo was present throughout and performed the entire procedure. Alejandra Wilson  Colorectal Surgery

## 2020-08-11 NOTE — CONSULTS
[address]      Consultation Note       Admit Date: 8/11/2020  Bed/Room No.  1007/1007-02  Admitting Physician : Caity Faith MD  Code Status :Full Code  Hospital Day:  LOS: 0 days   Patient is currently admitted for  treatment of  reversal ileostomy  Reason for Consult:  Medical Management   Requesting Physician: MD Elizabeth Metzger MD    HISTORY OF PRESENT ILLNESS:   The patient is a 69 y.o. male who is admitted for reversal ileostomy. Patient has history of ascending adenocarcinoma colon and underwent resection in May 2020. Patient had severe abdominal pain on 6/2/2020 and underwent emergent laparotomy, lysis of adhesions, diverting loop ileostomy drainage of subhepatic abscess on 6/12/2020 for pneumoperitoneum. Patient was discharged on TPN at that time. He has history of ascending colon adenocarcinoma. Patient is otherwise healthy. He has history of IBS. Patient also had postop A. fib on a second bowel surgery. Patient underwent reversal of ileostomy today without any complications today. He is current smoker unmotivated to quit. Patient lives with his wife and is fairly independent his ADLs.     Past Medical History:        Diagnosis Date    [facility] [address] [address]     Adenocarcinoma ([facility]  Colon 6/17/2020    Anxiety     Cancer (HCC)     basal cell forehead    Colon polyps     [place] (hard of hearing)     Hx of cold sores     IBS (irritable bowel syndrome)     Kidney stone     Neuropathy     PTSD (post-traumatic stress disorder)     Wears glasses        Past Surgical History:        Procedure Laterality Date    COLON SURGERY      COLONOSCOPY      march 2020    COSMETIC SURGERY      forehead scar revision    HEMICOLECTOMY N/A 5/28/2020    ABDOMINAL EXPLORATION WITH EXCISION OF TRANSVERSE COLON/RIGHT COLECTOMY performed by Caity Faith MD at [facility] N/A 6/12/2020    LAPAROTOMY EXPLORATORY LYSIS OF ADHESIONS REPAIR OF SEROSAL TEARS DRAINAGE OF SUBHEPATIC ABSCESS LOOP ILEOSTOMY CREATION performed by Chris Hassan MD at [place] [address].         Medications Prior to Admission:    Prior to Admission medications    Medication Sig Start Date End Date Taking? Authorizing Provider   ondansetron (ZOFRAN) 4 MG tablet Take 1 tablet by mouth every 8 hours as needed for Nausea or Vomiting 8/11/20  Yes Claudean Fordyce, DO   docusate sodium (COLACE) 100 MG capsule Take 1 capsule by mouth 2 times daily as needed for Constipation 8/11/20  Yes Claudean Valery, DO   HYDROcodone-acetaminophen (NORCO) 5-325 MG per tablet Take 1 tablet by mouth every 6 hours as needed for Pain for up to 5 days. 8/11/20 8/16/20 Yes Claudean Fordyce, DO   acetaminophen (TYLENOL) 500 MG tablet Take 2 tablets by mouth every 8 hours as needed for Pain or Fever 6/3/20  Yes Raman Cosby MD   clonazePAM (KLONOPIN) 0.5 MG tablet Take 0.5 mg by mouth 2 times daily as needed. Yes Historical Provider, MD   Multiple Vitamins-Minerals (THERAPEUTIC MULTIVITAMIN-MINERALS) tablet Take 1 tablet by mouth daily   Yes Historical Provider, MD   ibuprofen (ADVIL;MOTRIN) 600 MG tablet Take 0.5 tablets by mouth every 6 hours as needed for Pain 6/3/20   Raman Cosby MD   aspirin 81 MG chewable tablet Take 81 mg by mouth daily    Historical Provider, MD       Allergies:  Prednisone    Social History:   TOBACCO:   reports that he has been smoking. He has been smoking about 1.50 packs per day. He has never used smokeless tobacco.  ETOH:   reports no history of alcohol use. OCCUPATION:      Family History:       Problem Relation Age of Onset    Colon Cancer Neg Hx        Review of Systems   Constitutional: Negative for activity change, appetite change, fatigue, fever and unexpected weight change. HENT: Negative for congestion, nosebleeds, rhinorrhea, sinus pressure, sneezing and voice change. Respiratory: Negative for cough, choking, chest tightness, shortness of breath and wheezing.     Cardiovascular: Negative for chest pain, palpitations and leg swelling. Gastrointestinal: Negative for abdominal pain, constipation, diarrhea, nausea and vomiting. Genitourinary: Negative for difficulty urinating, discharge, dysuria, frequency and testicular pain. Musculoskeletal: Negative for back pain. Skin: Negative for rash. Neurological: Negative for dizziness, weakness, light-headedness and headaches. Hematological: Does not bruise/bleed easily. Psychiatric/Behavioral: Negative for agitation, behavioral problems, confusion, self-injury, sleep disturbance and suicidal ideas. Objective:   /68   Pulse 81   Temp 98.3 °F (36.8 °C) (Oral)   Resp 17   Ht 5' 10\" (1.778 m)   Wt 165 lb 5.5 oz (75 kg)   SpO2 98%   BMI 23.72 kg/m²       Intake/Output Summary (Last 24 hours) at 8/11/2020 1835  Last data filed at 8/11/2020 1630  Gross per 24 hour   Intake 1696 ml   Output 70 ml   Net 1626 ml       Physical Exam  Vitals signs and nursing note reviewed. Constitutional:       General: He is not in acute distress. Appearance: He is not diaphoretic. HENT:      Head: Normocephalic and atraumatic. Nose:      Right Sinus: No maxillary sinus tenderness or frontal sinus tenderness. Left Sinus: No maxillary sinus tenderness or frontal sinus tenderness. Mouth/Throat:      Pharynx: No oropharyngeal exudate. Eyes:      General: No scleral icterus. Conjunctiva/sclera: Conjunctivae normal.      Pupils: Pupils are equal, round, and reactive to light. Neck:      Musculoskeletal: Full passive range of motion without pain and neck supple. Thyroid: No thyromegaly. Vascular: No JVD. Cardiovascular:      Rate and Rhythm: Normal rate and regular rhythm. Pulses:           Dorsalis pedis pulses are 2+ on the right side and 2+ on the left side. Heart sounds: Normal heart sounds. No murmur.    Pulmonary:      Effort: Pulmonary effort is normal.      Breath sounds: Normal breath sounds. No wheezing or rales. Abdominal:      Palpations: Abdomen is soft. There is no mass. Tenderness: There is no abdominal tenderness. Comments: Midline abdominal surgical wound   Lymphadenopathy:      Head:      Right side of head: No submandibular adenopathy. Left side of head: No submandibular adenopathy. Cervical: No cervical adenopathy. Skin:     General: Skin is warm. Neurological:      Mental Status: He is alert and oriented to person, place, and time. Motor: No tremor. Psychiatric:         Behavior: Behavior is cooperative. Laboratory findings:    Hematology:  Recent Labs     08/07/20  1407   WBC 9.4   HGB 15.2   HCT 47.6   MCV 90.0          Chemistry:  Lab Results   Component Value Date     08/07/2020    K 4.3 08/07/2020     08/07/2020    CO2 25 08/07/2020    BUN 11 08/07/2020    CREATININE 0.91 08/07/2020    GLUCOSE 82 06/18/2020    CALCIUM 8.0 (L) 06/18/2020    PROT 5.2 (L) 06/14/2020    LABALBU 2.3 (L) 06/18/2020    BILITOT 0.22 (L) 06/14/2020    ALKPHOS 63 06/14/2020    AST 17 06/14/2020    ALT 14 06/14/2020    LABGLOM >60 08/07/2020    GFRAA >60 08/07/2020     Lab Results   Component Value Date    LABALBU 2.3 (L) 06/18/2020     No results found for: LABA1C  No results found for: EAG  No results found for: TSHFT4, TSH    Magnesium:   Lab Results   Component Value Date    MG 2.0 06/18/2020     Phosphorus:   Lab Results   Component Value Date    PHOS 3.2 06/18/2020     Ionized Calcium:   Lab Results   Component Value Date    CAION 1.25 06/13/2020      Last 3 Blood Glucose:   No results for input(s): GLUCOSE in the last 72 hours. Urinalysis:   PT/INR:    Lab Results   Component Value Date    PROTIME 15.5 05/30/2020    INR 1.3 05/30/2020     PTT:    Lab Results   Component Value Date    APTT 31.0 05/30/2020     Microbiology:    Imaging / Clinical Data:   No results found.         Assessment and Plan   Active Problems:    History of reversal of ileostomy  Resolved Problems:    * No resolved hospital problems. *         1. Focal ascending adenocarcinoma s/p resection of tumor May 2020 -   2. H/o diverting ileostomy for subhepatic abscess 06/2020 - s/p reversal of ileostomy  3. Post Op  -in normal sinus rhythm -    4. IBS    Pain control with IV opioid medications  DVT prophylaxis  Activity as tolerated. Awaiting return of bowel function. Continue IV fluids. N.p.o. for now    Thank you for allowing me in care of this patient and consult. call with any questions. Will follow with you. Patients questions answered . Updates discussed with patient and Staff  RN.      Gala Dancer, MD  8/11/2020  Copy of note to Octavio Murphy MD and  Jacques Alcazar MD    (Please note that portions of this note were completed with a voice recognition program. Efforts were made to edit the dictations but occasionally words are mis-transcribed.)

## 2020-08-12 LAB
ABSOLUTE EOS #: <0.03 K/UL (ref 0–0.44)
ABSOLUTE IMMATURE GRANULOCYTE: 0.09 K/UL (ref 0–0.3)
ABSOLUTE LYMPH #: 1.1 K/UL (ref 1.1–3.7)
ABSOLUTE MONO #: 0.49 K/UL (ref 0.1–1.2)
ANION GAP SERPL CALCULATED.3IONS-SCNC: 10 MMOL/L (ref 9–17)
BASOPHILS # BLD: 0 % (ref 0–2)
BASOPHILS ABSOLUTE: <0.03 K/UL (ref 0–0.2)
BUN BLDV-MCNC: 11 MG/DL (ref 8–23)
BUN/CREAT BLD: 15 (ref 9–20)
CALCIUM SERPL-MCNC: 8.8 MG/DL (ref 8.6–10.4)
CHLORIDE BLD-SCNC: 103 MMOL/L (ref 98–107)
CO2: 26 MMOL/L (ref 20–31)
CREAT SERPL-MCNC: 0.75 MG/DL (ref 0.7–1.2)
DIFFERENTIAL TYPE: ABNORMAL
EOSINOPHILS RELATIVE PERCENT: 0 % (ref 1–4)
GFR AFRICAN AMERICAN: >60 ML/MIN
GFR NON-AFRICAN AMERICAN: >60 ML/MIN
GFR SERPL CREATININE-BSD FRML MDRD: ABNORMAL ML/MIN/{1.73_M2}
GFR SERPL CREATININE-BSD FRML MDRD: ABNORMAL ML/MIN/{1.73_M2}
GLUCOSE BLD-MCNC: 126 MG/DL (ref 70–99)
HCT VFR BLD CALC: 43.2 % (ref 40.7–50.3)
HEMOGLOBIN: 13.8 G/DL (ref 13–17)
IMMATURE GRANULOCYTES: 1 %
LYMPHOCYTES # BLD: 7 % (ref 24–43)
MCH RBC QN AUTO: 28.6 PG (ref 25.2–33.5)
MCHC RBC AUTO-ENTMCNC: 31.9 G/DL (ref 28.4–34.8)
MCV RBC AUTO: 89.6 FL (ref 82.6–102.9)
MONOCYTES # BLD: 3 % (ref 3–12)
NRBC AUTOMATED: 0 PER 100 WBC
PDW BLD-RTO: 14.5 % (ref 11.8–14.4)
PLATELET # BLD: 225 K/UL (ref 138–453)
PLATELET ESTIMATE: ABNORMAL
PMV BLD AUTO: 10.8 FL (ref 8.1–13.5)
POTASSIUM SERPL-SCNC: 4.7 MMOL/L (ref 3.7–5.3)
RBC # BLD: 4.82 M/UL (ref 4.21–5.77)
RBC # BLD: ABNORMAL 10*6/UL
SEG NEUTROPHILS: 89 % (ref 36–65)
SEGMENTED NEUTROPHILS ABSOLUTE COUNT: 13.09 K/UL (ref 1.5–8.1)
SODIUM BLD-SCNC: 139 MMOL/L (ref 135–144)
WBC # BLD: 14.8 K/UL (ref 3.5–11.3)
WBC # BLD: ABNORMAL 10*3/UL

## 2020-08-12 PROCEDURE — 80048 BASIC METABOLIC PNL TOTAL CA: CPT

## 2020-08-12 PROCEDURE — 2580000003 HC RX 258: Performed by: STUDENT IN AN ORGANIZED HEALTH CARE EDUCATION/TRAINING PROGRAM

## 2020-08-12 PROCEDURE — 97166 OT EVAL MOD COMPLEX 45 MIN: CPT

## 2020-08-12 PROCEDURE — 97162 PT EVAL MOD COMPLEX 30 MIN: CPT

## 2020-08-12 PROCEDURE — 36415 COLL VENOUS BLD VENIPUNCTURE: CPT

## 2020-08-12 PROCEDURE — 99232 SBSQ HOSP IP/OBS MODERATE 35: CPT | Performed by: FAMILY MEDICINE

## 2020-08-12 PROCEDURE — 97535 SELF CARE MNGMENT TRAINING: CPT

## 2020-08-12 PROCEDURE — 85025 COMPLETE CBC W/AUTO DIFF WBC: CPT

## 2020-08-12 PROCEDURE — 6360000002 HC RX W HCPCS: Performed by: STUDENT IN AN ORGANIZED HEALTH CARE EDUCATION/TRAINING PROGRAM

## 2020-08-12 PROCEDURE — 2060000000 HC ICU INTERMEDIATE R&B

## 2020-08-12 PROCEDURE — 97530 THERAPEUTIC ACTIVITIES: CPT

## 2020-08-12 PROCEDURE — 97116 GAIT TRAINING THERAPY: CPT

## 2020-08-12 PROCEDURE — 6370000000 HC RX 637 (ALT 250 FOR IP): Performed by: STUDENT IN AN ORGANIZED HEALTH CARE EDUCATION/TRAINING PROGRAM

## 2020-08-12 RX ORDER — ONDANSETRON 2 MG/ML
4 INJECTION INTRAMUSCULAR; INTRAVENOUS EVERY 6 HOURS PRN
Status: DISCONTINUED | OUTPATIENT
Start: 2020-08-12 | End: 2020-08-15 | Stop reason: HOSPADM

## 2020-08-12 RX ORDER — IBUPROFEN 600 MG/1
600 TABLET ORAL 2 TIMES DAILY PRN
COMMUNITY

## 2020-08-12 RX ADMIN — MORPHINE SULFATE 2 MG: 2 INJECTION, SOLUTION INTRAMUSCULAR; INTRAVENOUS at 22:33

## 2020-08-12 RX ADMIN — CLONAZEPAM 0.5 MG: 0.5 TABLET ORAL at 09:29

## 2020-08-12 RX ADMIN — OXYCODONE HYDROCHLORIDE 5 MG: 5 TABLET ORAL at 09:29

## 2020-08-12 RX ADMIN — ACETAMINOPHEN 1000 MG: 500 TABLET ORAL at 21:24

## 2020-08-12 RX ADMIN — ACETAMINOPHEN 1000 MG: 500 TABLET ORAL at 05:47

## 2020-08-12 RX ADMIN — ACETAMINOPHEN 1000 MG: 500 TABLET ORAL at 13:49

## 2020-08-12 RX ADMIN — CLONAZEPAM 0.5 MG: 0.5 TABLET ORAL at 22:33

## 2020-08-12 RX ADMIN — OXYCODONE HYDROCHLORIDE 5 MG: 5 TABLET ORAL at 15:24

## 2020-08-12 RX ADMIN — MORPHINE SULFATE 4 MG: 4 INJECTION, SOLUTION INTRAMUSCULAR; INTRAVENOUS at 18:14

## 2020-08-12 RX ADMIN — SODIUM CHLORIDE, POTASSIUM CHLORIDE, SODIUM LACTATE AND CALCIUM CHLORIDE: 600; 310; 30; 20 INJECTION, SOLUTION INTRAVENOUS at 05:48

## 2020-08-12 RX ADMIN — Medication 10 ML: at 21:26

## 2020-08-12 ASSESSMENT — PAIN SCALES - GENERAL
PAINLEVEL_OUTOF10: 4
PAINLEVEL_OUTOF10: 7
PAINLEVEL_OUTOF10: 6
PAINLEVEL_OUTOF10: 3
PAINLEVEL_OUTOF10: 10
PAINLEVEL_OUTOF10: 3
PAINLEVEL_OUTOF10: 3
PAINLEVEL_OUTOF10: 1
PAINLEVEL_OUTOF10: 2
PAINLEVEL_OUTOF10: 3

## 2020-08-12 ASSESSMENT — PAIN DESCRIPTION - DESCRIPTORS
DESCRIPTORS: ACHING
DESCRIPTORS: ACHING;CRAMPING
DESCRIPTORS: ACHING

## 2020-08-12 ASSESSMENT — PAIN DESCRIPTION - ONSET
ONSET: ON-GOING

## 2020-08-12 ASSESSMENT — PAIN DESCRIPTION - FREQUENCY
FREQUENCY: CONTINUOUS

## 2020-08-12 ASSESSMENT — PAIN DESCRIPTION - PAIN TYPE
TYPE: SURGICAL PAIN

## 2020-08-12 ASSESSMENT — ENCOUNTER SYMPTOMS
SINUS PRESSURE: 0
RHINORRHEA: 0
NAUSEA: 0
WHEEZING: 0
SHORTNESS OF BREATH: 0
CHEST TIGHTNESS: 0
BACK PAIN: 0
VOICE CHANGE: 0
COUGH: 0
DIARRHEA: 0
VOMITING: 0
CONSTIPATION: 0
CHOKING: 0
ABDOMINAL PAIN: 1

## 2020-08-12 ASSESSMENT — PAIN DESCRIPTION - LOCATION
LOCATION: ABDOMEN

## 2020-08-12 ASSESSMENT — PAIN DESCRIPTION - PROGRESSION
CLINICAL_PROGRESSION: NOT CHANGED
CLINICAL_PROGRESSION: GRADUALLY WORSENING

## 2020-08-12 ASSESSMENT — PAIN - FUNCTIONAL ASSESSMENT: PAIN_FUNCTIONAL_ASSESSMENT: PREVENTS OR INTERFERES SOME ACTIVE ACTIVITIES AND ADLS

## 2020-08-12 NOTE — PROGRESS NOTES
Transitions of Care Pharmacy Service   Medication Review    The patient's list of current home medications has been reviewed. Source(s) of information: patient, family, surescripts    Based on information provided by the above source(s), I have updated the patient's home med list as described below. I changed or updated the following medications on the patient's home medication list:  Discontinued None      Added None      Adjusted   Clonazepam 0.5mg - added PRN reason (PTSD)  Ibuprofen 600mg - changed from 1/2Q6H PRN to 1BID PRN pain      Other Notes Ibuprofen 600mg - Patient only uses PRN - still has at home (written 06/03/20)           Please feel free to call me with any questions about this encounter. Thank you. This note will be reviewed and co-signed by the Transitions of Care Pharmacist. The pharmacist will review inpatient orders and contact the physician about any discrepancies. Ten Luna pharmacy technician  Transitions of [facility] Service  Phone:  [phone]  Fax: [phone]      Electronically signed by Ten Luna on 8/12/2020 at 4:52 PM     Prior to Admission medications    Medication Sig Start Date End Date Taking? Authorizing Provider   ibuprofen (ADVIL;MOTRIN) 600 MG tablet Take 600 mg by mouth 2 times daily as needed for Pain       acetaminophen (TYLENOL) 500 MG tablet Take 2 tablets by mouth every 8 hours as needed for Pain or Fever       clonazePAM (KLONOPIN) 0.5 MG tablet Take 0.5 mg by mouth 2 times daily as needed (PTSD).         Multiple Vitamins-Minerals (THERAPEUTIC MULTIVITAMIN-MINERALS) tablet Take 1 tablet by mouth daily       aspirin 81 MG chewable tablet Take 81 mg by mouth daily

## 2020-08-12 NOTE — CARE COORDINATION
Case Management Initial Discharge Plan  Solange Izquierdo,         Readmission Risk              Risk of Unplanned Readmission:        9             Met with:patient to discuss discharge plans. Information verified: address, contacts, phone number, , insurance Yes  PCP: Luann Mauricio MD  Date of last visit: [address] [facility] [address] Provider: medicare and bcbs    Discharge Planning  Current Residence:   house  Living Arrangements:  Spouse/Significant Other   Home has 1 stories/1 stair to climb  Support Systems:  Spouse/Significant Other, Family Members  Current Services PTA:   [facility]   Patient able to perform ADL's:Independent  DME used to aid ambulation prior to admission: none  During admission: none    Potential Assistance Needed:  Outpatient PT/OT    Pharmacy: [facility]   Potential Assistance Purchasing Medications:  No  Does patient want to participate in local refill/ meds to beds program?  No    Patient agreeable to home care: Yes  Freedom of choice provided:  yes      Type of Home Care Services:  Nursing Services  Patient expects to be discharged to:  home    Prior SNF/Rehab Placement and Facility: no  Agreeable to SNF/Rehab: No  [place] of choice provided: yes   Evaluation: yes    Expected Discharge date:  20  Follow Up Appointment: Best Day/ Time: Monday AM    Transportation provider: spouse  Transportation arrangements needed for discharge: No    Discharge Plan:   Patient lives in 1 story home with 1 step to enter. Patient was independent with adl's and does not use dme. Patient has ileostomy and had Martell RN once weekly to assist. Patient had revision of ileostomy on 20. Discharge plan is to return home with home care services, additional needs tbd. Patient denied drug or alcohol use. SBIRT completed.         Electronically signed by CECILLE Tarango on 20 at 2:17 PM EDT

## 2020-08-12 NOTE — PROGRESS NOTES
[address]      Daily Progress Note     Admit Date: 8/11/2020  Bed/Room No.  1007/1007-02  Admitting Physician : Aziza Coon MD  Code Status :Full Code  Hospital Day:  LOS: 1 day   Chief Complaint:   Reversal of ileostomy     Active Problems:    History of reversal of ileostomy  Resolved Problems:    * No resolved hospital problems. *    Subjective : Interval History/Significant events :  08/12/20    Patient moderate generalized abdominal pain. He denies any nausea N hiccups. Patient is not passing any flatus. He does not report any emesis. .  Patient is able to urinate okay. Vitals - Stable afebrile  Labs -leukocytosis. Nursing notes , Consults notes reviewed. Overnight events and updates discussed with Nursing staff . Background History:         Joshua Ledezma is 69 y.o. male  Who was admitted to the hospital on 8/11/2020 for treatment of reversal of  ileostomy. Patient had severe abdominal pain on 6/2/2020 and underwent emergent laparotomy, lysis of adhesions, diverting loop ileostomy drainage of subhepatic abscess on 6/12/2020 for pneumoperitoneum. Patient was discharged on TPN at that time. He has history of ascending colon adenocarcinoma. Patient is otherwise healthy. He has history of IBS. Patient also had postop A. fib on a second bowel surgery with spontaneous conversion to NSR. Patient is not on long-term beta-blockers for anticoagulation. Patient underwent reversal of ileostomy on 8/11/2020 without any complications. PMH:  Past Medical History:   Diagnosis Date    [facility]     Adenocarcinoma ([facility] [address]  Colon 6/17/2020    Anxiety     Cancer (HCC)     basal cell forehead    Colon polyps     [place] (hard of hearing)     Hx of cold sores     IBS (irritable bowel syndrome)     Kidney stone     Neuropathy     PTSD (post-traumatic stress disorder)     Wears glasses       Allergies:    Allergies   Allergen Reactions    Prednisone Rash and Other (See Comments)     Fast heartrate        Medications :  sodium chloride flush, 10 mL, Intravenous, 2 times per day  enoxaparin, 40 mg, Subcutaneous, Daily  acetaminophen, 1,000 mg, Oral, 3 times per day        Review of Systems   Review of Systems   Constitutional: Negative for activity change, appetite change, fatigue, fever and unexpected weight change. HENT: Negative for congestion, nosebleeds, rhinorrhea, sinus pressure, sneezing and voice change. Respiratory: Negative for cough, choking, chest tightness, shortness of breath and wheezing. Cardiovascular: Negative for chest pain, palpitations and leg swelling. Gastrointestinal: Positive for abdominal pain. Negative for constipation, diarrhea, nausea and vomiting. Genitourinary: Negative for difficulty urinating, discharge, dysuria, frequency and testicular pain. Musculoskeletal: Negative for back pain. Skin: Negative for rash. Neurological: Negative for dizziness, weakness, light-headedness and headaches. Hematological: Does not bruise/bleed easily. Psychiatric/Behavioral: Negative for agitation, behavioral problems, confusion, self-injury, sleep disturbance and suicidal ideas.      Objective :      Current Vitals : Temp: 97.7 °F (36.5 °C),  Pulse: 71, Resp: 16, BP: 98/60, SpO2: 100 %  Last 24 Hrs Vitals   Patient Vitals for the past 24 hrs:   BP Temp Temp src Pulse Resp SpO2 Height Weight   08/12/20 0350 98/60 97.7 °F (36.5 °C) Axillary 71 16 100 % -- --   08/12/20 0009 114/63 98.4 °F (36.9 °C) Axillary 75 16 100 % -- --   08/11/20 1936 (!) 141/66 97.9 °F (36.6 °C) Oral 87 16 100 % -- --   08/11/20 1645 130/68 98.3 °F (36.8 °C) Oral 81 17 98 % -- --   08/11/20 1630 134/75 98.1 °F (36.7 °C) Temporal 76 13 100 % -- --   08/11/20 1615 129/75 -- -- 74 13 100 % -- --   08/11/20 1600 137/78 -- -- 79 15 100 % -- --   08/11/20 1545 130/64 -- -- 81 19 100 % -- --   08/11/20 1530 134/81 -- -- 76 14 100 % -- --   08/11/20 1511 115/76 97 °F (36.1 °C) Temporal 76 16 100 % -- --   08/11/20 1128 104/63 97.3 °F (36.3 °C) Oral 62 15 100 % 5' 10\" (1.778 m) 165 lb 5.5 oz (75 kg)     Intake / output   08/11 0701 - 08/12 0700  In: 2971 [P.O.:50; I.V.:2921]  Out: 4044 [Urine:1175]  Physical Exam:  Physical Exam  Vitals signs and nursing note reviewed. Constitutional:       General: He is not in acute distress. Appearance: He is not diaphoretic. HENT:      Head: Normocephalic and atraumatic. Nose:      Right Sinus: No maxillary sinus tenderness or frontal sinus tenderness. Left Sinus: No maxillary sinus tenderness or frontal sinus tenderness. Mouth/Throat:      Pharynx: No oropharyngeal exudate. Eyes:      General: No scleral icterus. Conjunctiva/sclera: Conjunctivae normal.      Pupils: Pupils are equal, round, and reactive to light. Neck:      Musculoskeletal: Full passive range of motion without pain and neck supple. Thyroid: No thyromegaly. Vascular: No JVD. Cardiovascular:      Rate and Rhythm: Normal rate and regular rhythm. Pulses:           Dorsalis pedis pulses are 2+ on the right side and 2+ on the left side. Heart sounds: Normal heart sounds. No murmur. Pulmonary:      Effort: Pulmonary effort is normal.      Breath sounds: Normal breath sounds. No wheezing or rales. Abdominal:      General: Bowel sounds are decreased. Palpations: Abdomen is soft. There is no mass. Tenderness: There is abdominal tenderness. Comments: Surgical wound with dressing in place. Healed midline surgical wound   Lymphadenopathy:      Head:      Right side of head: No submandibular adenopathy. Left side of head: No submandibular adenopathy. Cervical: No cervical adenopathy. Skin:     General: Skin is warm. Neurological:      Mental Status: He is alert and oriented to person, place, and time. Motor: No tremor. Psychiatric:         Behavior: Behavior is cooperative.            Laboratory findings:    Recent Labs     08/12/20  0543   WBC 14.8*   HGB 13.8   HCT 43.2        Recent Labs     08/12/20  0543      K 4.7      CO2 26   GLUCOSE 126*   BUN 11   CREATININE 0.75   CALCIUM 8.8     No results for input(s): PROT, LABALBU, LABA1C, Q8JVKIK, Y3RUJQJ, FT4, TSH, AST, ALT, LDH, GGT, ALKPHOS, BILITOT, BILIDIR, AMMONIA, AMYLASE, LIPASE, LACTATE, CHOL, HDL, LDLCHOLESTEROL, CHOLHDLRATIO, TRIG, VLDL, BNP, TROPONINI, CKTOTAL, CKMB, CKMBINDEX, RF, MITCH in the last 72 hours. No results found for: Kirsty Turk, [facility], BLOODU, BACTERIA, NITRU, [address], LEUKOCYTESUR    Imaging / Clinical Data :-   No results found. Clinical Course : stable  Assessment and Plan  :        1. H/o Focal ascending adenocarcinoma s/p resection of tumor May 2020 -   2. H/o diverting ileostomy for subhepatic abscess 06/2020 - s/p reversal of ileostomy  3. Post Op  -in normal sinus rhythm -    4. IBS     Pain control with IV opioid medications. Encourage ambulation  DVT prophylaxis  Activity as tolerated. Awaiting return of bowel function. Continue IV fluids. N.p.o. for now with ice chips per gen surgery   Zofran IV for hiccups. Continue to monitor vitals , Intake / output ,  Cell count , HGB , Kidney function, oxygenation  as indicated . Plan and updates discussed with patient ,  answers  explained to satisfaction.    Plan discussed with Staff  RN     (Please note that portions of this note were completed with a voice recognition program. Efforts were made to edit the dictations but occasionally words are mis-transcribed.)      Leigha Paz MD  8/12/2020

## 2020-08-12 NOTE — PROGRESS NOTES
Occupational Therapy   Occupational Therapy Initial Assessment  Date: 2020   Patient Name: Caesar Kan  MRN: [id number]     : 1950    Date of Service: 2020    Discharge Recommendations:  Home with assist PRN  OT Equipment Recommendations  Equipment Needed: Yes  Mobility Devices: ADL Assistive Devices  ADL Assistive Devices: Long-handled Sponge    RN reports patient is medically stable for therapy treatment this date. Chart reviewed prior to treatment and patient is agreeable for therapy. All lines intact and patient positioned comfortably at end of treatment. All patient needs addressed prior to ending therapy session. Per Chart: The patient is a 69 y.o. male who is admitted for reversal ileostomy. Patient has history of ascending adenocarcinoma colon and underwent resection in May 2020. Patient had severe abdominal pain on 2020 and underwent emergent laparotomy, lysis of adhesions, diverting loop ileostomy drainage of subhepatic abscess on 2020 for pneumoperitoneum. Patient was discharged on TPN at that time. He has history of ascending colon adenocarcinoma. Patient is otherwise healthy. He has history of IBS. Patient also had postop A. fib on a second bowel surgery. Patient underwent reversal of ileostomy today without any complications today. He is current smoker unmotivated to quit. Patient lives with his wife and is fairly independent his ADLs. Assessment   Performance deficits / Impairments: Decreased ADL status; Decreased functional mobility ; Decreased posture  Prognosis: Good  Decision Making: Medium Complexity  OT Education: OT Role;Plan of Care;Precautions; ADL Adaptive Strategies; Equipment  REQUIRES OT FOLLOW UP: Yes  Activity Tolerance  Activity Tolerance: Patient Tolerated treatment well;Patient limited by pain  Safety Devices  Safety Devices in place: Yes  Type of devices: Left in bed;Bed alarm in place;Nurse notified;Call light within reach;Gait belt Patient Diagnosis(es): The encounter diagnosis was Surgery, elective. has a past medical history of A-fib ([facility] [address] Adenocarcinoma ([facility] [address]  Colon, Anxiety, Cancer ([facility] [address] Colon polyps, [place] (hard of hearing), Hx of cold sores, IBS (irritable bowel syndrome), Kidney stone, Neuropathy, PTSD (post-traumatic stress disorder), and Wears glasses. has a past surgical history that includes Colonoscopy; Cosmetic surgery; Tooth Extraction; hemicolectomy (N/A, 5/28/2020); Colon surgery; laparotomy (N/A, 6/12/2020); and Small intestine surgery (N/A, 8/11/2020). Restrictions  Restrictions/Precautions  Restrictions/Precautions: General Precautions, Fall Risk  Required Braces or Orthoses?: Yes  Required Braces or Orthoses  Other: Abdominal Binder  Position Activity Restriction  Other position/activity restrictions: NPO except ice chips, sips with meds, Telemetry, up as tolerated    Subjective   General  Chart Reviewed: Yes  Patient assessed for rehabilitation services?: Yes  Family / Caregiver Present: Yes(Wife arrived intermediate through session)  General Comment  Comments: Pt reports painful cramping in abdomen  Pain Assessment  Pain Level: 6  Vital Signs  Temp: 97.5 °F (36.4 °C)  Temp Source: Oral  Pulse: 65  Heart Rate Source: Monitor  Resp: 14  BP: 104/60  BP Upper/Lower: Upper  MAP (mmHg): 71  Oxygen Therapy  SpO2: 98 %  O2 Device: None (Room air)  Social/Functional History  Social/Functional History  Lives With: Spouse  Type of Home: House  Home Layout: One level  Home Access: Stairs to enter with rails  Entrance Stairs - Number of Steps: 4  Entrance Stairs - Rails: Both  Bathroom Shower/Tub: Walk-in shower  Bathroom Toilet: Standard  Bathroom Equipment: Grab bars in shower, Shower chair  Home Equipment: Reacher  ADL Assistance: Independent(after first surgery in June pt req'd help from wife to bathe d/t ileostomy.  Prior to that surgery pt was Independent)  Homemaking Assistance: [facility] Responsibilities: Yes  Ambulation Assistance: Independent  Transfer Assistance: Independent  Active : No(wife)  Patient's  Info: Pt has not driven since surgery in June and wife drives, prior to surgery pt drove  Occupation: Retired  Type of occupation:  at [employer] [address]: in[employer]  Additional Comments: Pt denies recent falls       Objective   Vision: Impaired  Vision Exceptions: Wears glasses at all times  Hearing: Exceptions to [facility]  Hearing Exceptions: Hard of hearing/hearing concerns    Orientation  Overall Orientation Status: Within Functional Limits  Observation/Palpation  Observation: Pt resting in bed, L IV, agreeable to therapy  Balance  Sitting Balance: Supervision  Standing Balance: Contact guard assistance  Standing Balance  Time: ~30 secs  Activity: Pt stood at sink to wash hands after toileting  Functional Mobility  Functional - Mobility Device: No device  Activity: To/from bathroom  Assist Level: Contact guard assistance  Functional Mobility Comments: Pt walked from bed to bathroom, bathroom to room door and then back to bed. Pt was limited d/t pain in abdomen and req'd some assist to manage IV  Toilet Transfers  Toilet - Technique: Ambulating  Equipment Used: Standard toilet  Toilet Transfer: Contact guard assistance  Toilet Transfers Comments: Pt was cued on squaring self up to toilet and reaching back for grab bar before sitting. ADL  Feeding: Independent;Setup  Grooming: Independent;Setup  UE Bathing: Supervision  LE Bathing: Moderate assistance  UE Dressing: Supervision  LE Dressing: Moderate assistance  Toileting: Supervision  Additional Comments: Pt is limited d/t abdominal pain and abdoment precuations. Pt toileted during session and needed some assist to manage lines. Pt stood at sink for hand hygience.  Pt was educated on the use of a long handled sponge and reacher to reduce bending during LB ADLs  Tone RUE  RUE Tone: Normotonic  Tone LUE  LUE Tone: Normotonic     Bed mobility  Bridging: Stand by assistance  Supine to Sit: Stand by assistance  Sit to Supine: Stand by assistance  Scooting: Minimal assistance  Comment: Pt was educated on technique for sit to supine and bridging to change positions to maintain abdomen precautions. Pt used bed rails for supine to sit and req'd Min A for scooting up in bed to lift UB slightly and place pillow under head in comfortable position. Transfers  Sit to stand: Contact guard assistance  Stand to sit: Contact guard assistance  Transfer Comments: Pt was cued on pushing off from surface when standing and lowering himself to surface when sitting     Cognition  Overall Cognitive Status: WFL  Perception  Overall Perceptual Status: WFL     Sensation  Overall Sensation Status: WFL        LUE AROM (degrees)  LUE AROM : WFL  RUE AROM (degrees)  RUE AROM : WFL  LUE Strength  LUE Strength Comment: UE strength not tested d/t abdomen precautions                   Plan   Plan  Times per week: 4-5x/week, 1-2x/day  Current Treatment Recommendations: Patient/Caregiver Education & Training, Self-Care / ADL, Safety Education & Training, Functional Mobility Training, Balance Training, Equipment Evaluation, Education, & procurement, Pain Management                 Goals  Short term goals  Time Frame for Short term goals: by d/c, pt will  Short term goal 1: demo S with ADL transfers with good safety and approp AD/DME  Short term goal 2: demo S with functional mobility with good safety and pacing  Short term goal 3: demo MI/I with UB ADLs and Min A LB ADLs with good safety and AD/DME as needed  Short term goal 4: demo MI/I with toileting routine with good safety  Short term goal 5: demo and verb good understanding of abdomen precautions and possible equip needs  Patient Goals   Patient goals :  To reduce cramping pain       Therapy Time   Individual Concurrent Group Co-treatment   Time In 1430         Time Out 1520         Minutes 50 Audie Hope

## 2020-08-12 NOTE — PLAN OF CARE
Problem: Pain:  Goal: Pain level will decrease  Description: Pain level will decrease  Outcome: Ongoing  Medicate as needed for pain utilizing the pain number scale.

## 2020-08-12 NOTE — PLAN OF CARE
Problem: Pain:  Goal: Pain level will decrease  Description: Pain level will decrease  8/12/2020 1057 by Emily Diaz RN  Outcome: Ongoing     Problem: Pain:  Goal: Control of acute pain  Description: Control of acute pain  8/12/2020 1057 by Emily Diaz RN  Outcome: Ongoing     Problem: Pain:  Goal: Control of chronic pain  Description: Control of chronic pain  8/12/2020 1057 by Emily Diaz RN  Outcome: Ongoing     Problem: Falls - Risk of:  Goal: Will remain free from falls  Description: Will remain free from falls  8/12/2020 1057 by Emily Diaz RN  Outcome: Ongoing     Problem: Falls - Risk of:  Goal: Absence of physical injury  Description: Absence of physical injury  8/12/2020 1057 by Emily Diaz RN  Outcome: Ongoing

## 2020-08-12 NOTE — PROGRESS NOTES
Physical Therapy    Facility/Department: [facility] CARE  Initial Assessment    NAME: Mati Cadena  : 1950  MRN: [id number]    Date of Service: 2020    Discharge Recommendations:  Home with assist PRN     Pt presented to surgery on 20 for ileostomy closure after exploratory laparotomy, lysis adhesions & diverting loop ileostomy on 20        RN reports patient is medically stable for therapy treatment this date. Chart reviewed prior to treatment and patient is agreeable for therapy. Assessment   Body structures, Functions, Activity limitations: Decreased functional mobility ; Decreased endurance;Decreased safe awareness;Decreased balance  Assessment: Pt tolerated treatment well with minor deficits of functional mobility & balance. Pt to benefit from cont'd IP PT to maximize indep with functional mobility, balance, & safety awareness. Recommened D/C to home with assist PRN. Prognosis: Good  Exam: ROM, MMT, functional mobility, activity tolerance, balance, gait, & [employer] AM-PAC 6 Clicks Basic Mobility  Clinical Presentation: stable  PT Education: Transfer Training;General Safety;Home Exercise Program;Precautions;Gait Training;Functional Mobility Training  Patient Education: Pt ed on proper log roll technique. REQUIRES PT FOLLOW UP: Yes  Activity Tolerance  Activity Tolerance: Patient Tolerated treatment well       Patient Diagnosis(es): The encounter diagnosis was Surgery, elective. has a past medical history of A-fib ([address] Adenocarcinoma ([address]  Colon, Anxiety, Cancer ([address] Colon polyps, [place] (hard of hearing), Hx of cold sores, IBS (irritable bowel syndrome), Kidney stone, Neuropathy, PTSD (post-traumatic stress disorder), and Wears glasses. has a past surgical history that includes Colonoscopy; Cosmetic surgery; Tooth Extraction; hemicolectomy (N/A, 2020);  Colon surgery; laparotomy (N/A, 2020); and Small intestine surgery (N/A, RUE  Comment: see OT assessment  Strength LUE  Comment: see OT assessment  Tone RLE  RLE Tone: Normotonic  Tone LLE  LLE Tone: Normotonic  Sensation  Overall Sensation Status: WNL  Bed mobility  Bridging: Stand by assistance  Rolling to Left: Contact guard assistance  Supine to Sit: Contact guard assistance  Scooting: Stand by assistance  Comment: Pt ed on proper log roll technique. Transfers  Sit to Stand: Contact guard assistance  Stand to sit: Contact guard assistance  Bed to Chair: Contact guard assistance  Stand Pivot Transfers: Contact guard assistance  Lateral Transfers: Contact guard assistance  Ambulation  Ambulation?: Yes  Ambulation 1  Surface: level tile  Device: No Device  Assistance: Contact guard assistance  Quality of Gait: step through pattern  Distance: 150ft  Comments: Pt amb to BR for toileting, stood 2 min, amb 2ft to sink for hand hygiene and facial hygiene x 10 minutes for then ambulated 125 ft  & sat to chair with Contact Guard Assistance. Balance  Posture: Good  Sitting - Static: Good  Sitting - Dynamic: Good  Standing - Static: Fair;+  Standing - Dynamic: Fair;+       All lines intact, call light within reach, and patient positioned comfortably at end of treatment. All patient needs addressed prior to ending therapy session. Plan   Plan  Times per week: 1-2x day 5-6 wk  Current Treatment Recommendations: Transfer Training, Endurance Training, Patient/Caregiver Education & Training, Balance Training, Functional Mobility Training, Stair training, Gait Training, Home Exercise Program, Safety Education & Training  Safety Devices  Type of devices:  All fall risk precautions in place, Call light within reach, Chair alarm in place, Left in chair, Gait belt    G-Code       OutComes Score                                                  AM-PAC Score  AM-PAC Inpatient Mobility Raw Score : 19 (08/12/20 2335)  -PAC Inpatient T-Scale Score : 45.44 (08/12/20 2166)  Mobility Inpatient CMS 0-100% Score: 41.77 (08/12/20 0943)  Mobility Inpatient CMS G-Code Modifier : CK (08/12/20 0943)          Goals  Short term goals  Time Frame for Short term goals: 12 vists  Short term goal 1: Inc amb 300ft indep. Short term goal 2: Pt to be able to go up/down 4 steps with bi railings. Short term goal 3: Inc bed mobility & transfers to indep to enable pt to safely get in/OOB & return to PLOF & decrease risk for falls  Short term goal 4: Pt to tolerate 30 min of ex & gait to facilitate activity tolerance to [facility]. Short term goal 5: Ed pt on abd precautions, HEP, & safety & energy principles & issue HEP.        Therapy Time   Individual Concurrent Group Co-treatment   Time In 0840         Time Out 0922         Minutes 42+10=52              Additional 10 minutes for chart review            Madeline Geronimo    Evaluation, Treatment & documentation completed by Madeline Geronimo, Student DPT  Directly Supervised & co-signed by Leander Mckenna, PT

## 2020-08-12 NOTE — PROGRESS NOTES
Surgery Progress Note       PATIENT NAME: Sneha Hutchins     TODAY'S DATE: 8/12/2020    SUBJECTIVE:    Pt seen and examined. No issues overnight. Denies any f/c, n/v, sob or chest pain. No flatus or BM yet. Tmax 36.9    OBJECTIVE:   Vitals:  BP 98/60   Pulse 71   Temp 97.7 °F (36.5 °C) (Axillary)   Resp 16   Ht 5' 10\" (1.778 m)   Wt 165 lb 5.5 oz (75 kg)   SpO2 100%   BMI 23.72 kg/m²      INTAKE/OUTPUT:      Intake/Output Summary (Last 24 hours) at 8/12/2020 3669  Last data filed at 8/12/2020 7304  Gross per 24 hour   Intake 2971 ml   Output 1195 ml   Net 1776 ml                 General: AOx3, NAD  Lungs: Symmetrical chest rise bilaterally, no audible wheezes or rhonchi  Heart: S1S2  Abdomen: Soft, ND, appropriately tender, non peritoneal, no rebound   Extremity: moves all extremities x4, No edema    Data Review:  CBC:   Recent Labs     08/12/20  0543   WBC 14.8*   HGB 13.8        BMP:  No results for input(s): NA, K, CL, CO2, BUN, CREATININE, GLUCOSE in the last 72 hours. Hepatic: No results for input(s): AST, ALT, ALB, ALKPHOS, BILITOT, BILIDIR in the last 72 hours. Coagulation: No results for input(s): APTT, PROT, INR in the last 72 hours. ASSESSMENT     Patient Active Problem List   Diagnosis    Colon polyps    Anxiety    Wears glasses    PTSD (post-traumatic stress disorder)    Neuropathy    [facility]dominal [facility] ([facility]    Essential hypertension    Hyperlipidemia    Tobacco use disorder    Pneumoperitoneum    Postoperative atrial fibrillation (HCC)    Severe malnutrition (HCC)    Neutrophilic leukocytosis    Ileostomy status (HCC)    Hypocalcemia    Hypoalbuminemia due to protein-calorie malnutrition (HCC)    Normocytic normochromic anemia    Paroxysmal atrial fibrillation (HCC)    Adenocarcinoma (HCC)  Colon    History of reversal of ileostomy   70 yo M s/p 8/11/20 ileostomy reversal    PLAN  1. Neuro- Analgesia multimodal pain therapy  2.  CV- continue to monitor BP/HR, hold ASA 81mg at this time  3. Resp- continue to encourage incentive spirometry and deep breathing every hour while awake per RT and RN  4. GI- Diet NPO, ice chips ok, monitor for bowel function, Wound care- dressing down POD2  5. Renal- jayne UOP, accurate I/Os, replace electrolytes PRN, IVF transition to D5  6. Msk- continue to encourage ambulation/activity, PT/OT eval and Tx   7. ID-received 24hr Abx, jayne for fevers  8. Ppx- ok for DVT ppx- lovenox, GI ppx  9. F/U Labs  10. Dispo planning: Await return of bowel function and toleration of diet    Thank you,    Electronically signed by Rodrigue Ace DO  on 8/12/2020 at 6:13 AM I Dr. Fei Pablo saw and examined the patient. I have edited the above and agree with the above. Alejandra Wilson  Colorectal Surgery

## 2020-08-12 NOTE — FLOWSHEET NOTE
Writer rounding. Patient sitting in bedside chair. There are no visitors. Patient is pleasant and engages in conversation with writer. He shares about his illness and admission, and states that he is feeling better. Writer offers support and presence. Patient requests prayer, and he and writer pray together. Patient expresses thanks. Spiritual Care will follow as needed.        08/12/20 1016   Encounter Summary   Services provided to: Patient   Referral/Consult From: Rounding   Continue Visiting   (8/12/20)   Complexity of Encounter Moderate   Length of Encounter 15 minutes   Spiritual Assessment Completed Yes   Routine   Type Initial   Spiritual/[facility]ist   Type Spiritual support   Assessment Approachable;Coping   Intervention Active listening;Explored feelings, thoughts, concerns;Prayer   Outcome Engaged in conversation;Expressed feelings/needs/concerns;Expressed gratitude

## 2020-08-13 LAB
EKG ATRIAL RATE: 81 BPM
EKG P AXIS: 60 DEGREES
EKG P-R INTERVAL: 132 MS
EKG Q-T INTERVAL: 356 MS
EKG QRS DURATION: 80 MS
EKG QTC CALCULATION (BAZETT): 413 MS
EKG R AXIS: -14 DEGREES
EKG T AXIS: 39 DEGREES
EKG VENTRICULAR RATE: 81 BPM

## 2020-08-13 PROCEDURE — 6360000002 HC RX W HCPCS: Performed by: STUDENT IN AN ORGANIZED HEALTH CARE EDUCATION/TRAINING PROGRAM

## 2020-08-13 PROCEDURE — 93005 ELECTROCARDIOGRAM TRACING: CPT | Performed by: FAMILY MEDICINE

## 2020-08-13 PROCEDURE — 99232 SBSQ HOSP IP/OBS MODERATE 35: CPT | Performed by: FAMILY MEDICINE

## 2020-08-13 PROCEDURE — 97535 SELF CARE MNGMENT TRAINING: CPT

## 2020-08-13 PROCEDURE — 2060000000 HC ICU INTERMEDIATE R&B

## 2020-08-13 PROCEDURE — 2500000003 HC RX 250 WO HCPCS: Performed by: STUDENT IN AN ORGANIZED HEALTH CARE EDUCATION/TRAINING PROGRAM

## 2020-08-13 PROCEDURE — 97116 GAIT TRAINING THERAPY: CPT

## 2020-08-13 PROCEDURE — 6370000000 HC RX 637 (ALT 250 FOR IP): Performed by: STUDENT IN AN ORGANIZED HEALTH CARE EDUCATION/TRAINING PROGRAM

## 2020-08-13 PROCEDURE — 2580000003 HC RX 258: Performed by: STUDENT IN AN ORGANIZED HEALTH CARE EDUCATION/TRAINING PROGRAM

## 2020-08-13 PROCEDURE — 93010 ELECTROCARDIOGRAM REPORT: CPT | Performed by: INTERNAL MEDICINE

## 2020-08-13 RX ORDER — DEXTROSE, SODIUM CHLORIDE, AND POTASSIUM CHLORIDE 5; .45; .15 G/100ML; G/100ML; G/100ML
INJECTION INTRAVENOUS CONTINUOUS
Status: DISCONTINUED | OUTPATIENT
Start: 2020-08-13 | End: 2020-08-14

## 2020-08-13 RX ADMIN — ACETAMINOPHEN 1000 MG: 500 TABLET ORAL at 14:12

## 2020-08-13 RX ADMIN — POTASSIUM CHLORIDE, DEXTROSE MONOHYDRATE AND SODIUM CHLORIDE: 150; 5; 450 INJECTION, SOLUTION INTRAVENOUS at 22:44

## 2020-08-13 RX ADMIN — CLONAZEPAM 0.5 MG: 0.5 TABLET ORAL at 08:41

## 2020-08-13 RX ADMIN — ENOXAPARIN SODIUM 40 MG: 40 INJECTION SUBCUTANEOUS at 08:41

## 2020-08-13 RX ADMIN — Medication 10 ML: at 08:42

## 2020-08-13 RX ADMIN — POTASSIUM CHLORIDE, DEXTROSE MONOHYDRATE AND SODIUM CHLORIDE: 150; 5; 450 INJECTION, SOLUTION INTRAVENOUS at 14:20

## 2020-08-13 RX ADMIN — OXYCODONE HYDROCHLORIDE 5 MG: 5 TABLET ORAL at 08:42

## 2020-08-13 RX ADMIN — MORPHINE SULFATE 4 MG: 4 INJECTION, SOLUTION INTRAMUSCULAR; INTRAVENOUS at 22:44

## 2020-08-13 RX ADMIN — OXYCODONE HYDROCHLORIDE 5 MG: 5 TABLET ORAL at 19:48

## 2020-08-13 RX ADMIN — SODIUM CHLORIDE, POTASSIUM CHLORIDE, SODIUM LACTATE AND CALCIUM CHLORIDE: 600; 310; 30; 20 INJECTION, SOLUTION INTRAVENOUS at 05:26

## 2020-08-13 RX ADMIN — ACETAMINOPHEN 1000 MG: 500 TABLET ORAL at 05:26

## 2020-08-13 RX ADMIN — CLONAZEPAM 0.5 MG: 0.5 TABLET ORAL at 19:48

## 2020-08-13 RX ADMIN — OXYCODONE HYDROCHLORIDE 5 MG: 5 TABLET ORAL at 01:41

## 2020-08-13 RX ADMIN — OXYCODONE HYDROCHLORIDE 5 MG: 5 TABLET ORAL at 13:25

## 2020-08-13 RX ADMIN — ACETAMINOPHEN 1000 MG: 500 TABLET ORAL at 22:44

## 2020-08-13 ASSESSMENT — ENCOUNTER SYMPTOMS
WHEEZING: 0
COUGH: 0
DIARRHEA: 0
CHEST TIGHTNESS: 0
NAUSEA: 0
CONSTIPATION: 0
ABDOMINAL PAIN: 1
RHINORRHEA: 0
SINUS PRESSURE: 0
CHOKING: 0
VOMITING: 0
BACK PAIN: 0
VOICE CHANGE: 0
SHORTNESS OF BREATH: 0

## 2020-08-13 ASSESSMENT — PAIN DESCRIPTION - FREQUENCY
FREQUENCY: CONTINUOUS

## 2020-08-13 ASSESSMENT — PAIN SCALES - GENERAL
PAINLEVEL_OUTOF10: 5
PAINLEVEL_OUTOF10: 3
PAINLEVEL_OUTOF10: 4
PAINLEVEL_OUTOF10: 4
PAINLEVEL_OUTOF10: 7
PAINLEVEL_OUTOF10: 6
PAINLEVEL_OUTOF10: 3
PAINLEVEL_OUTOF10: 6

## 2020-08-13 ASSESSMENT — PAIN - FUNCTIONAL ASSESSMENT
PAIN_FUNCTIONAL_ASSESSMENT: ACTIVITIES ARE NOT PREVENTED
PAIN_FUNCTIONAL_ASSESSMENT: PREVENTS OR INTERFERES SOME ACTIVE ACTIVITIES AND ADLS
PAIN_FUNCTIONAL_ASSESSMENT: ACTIVITIES ARE NOT PREVENTED

## 2020-08-13 ASSESSMENT — PAIN DESCRIPTION - PROGRESSION
CLINICAL_PROGRESSION: NOT CHANGED
CLINICAL_PROGRESSION: GRADUALLY WORSENING
CLINICAL_PROGRESSION: NOT CHANGED

## 2020-08-13 ASSESSMENT — PAIN DESCRIPTION - ONSET
ONSET: ON-GOING

## 2020-08-13 ASSESSMENT — PAIN DESCRIPTION - LOCATION
LOCATION: ABDOMEN

## 2020-08-13 ASSESSMENT — PAIN DESCRIPTION - DESCRIPTORS
DESCRIPTORS: ACHING

## 2020-08-13 ASSESSMENT — PAIN DESCRIPTION - PAIN TYPE
TYPE: SURGICAL PAIN

## 2020-08-13 NOTE — PROGRESS NOTES
[address]      Daily Progress Note     Admit Date: 8/11/2020  Bed/Room No.  1007/1007-02  Admitting Physician : Erin Byrd MD  Code Status :Full Code  Hospital Day:  LOS: 2 days   Chief Complaint:   Reversal of ileostomy     Active Problems:    History of reversal of ileostomy  Resolved Problems:    * No resolved hospital problems. *    Subjective : Interval History/Significant events :  08/13/20    Patient is passing some flatus. Pain is controlled. He denies any breathing difficulty, nausea, vomiting. Patient has been ambulatory without any difficulty. No bowel movement. He tolerated clears and water sips and ice chips okay. Vitals - Stable afebrile  Labs -leukocytosis. Nursing notes , Consults notes reviewed. Overnight events and updates discussed with Nursing staff . Background History:         Celio Wallace is 69 y.o. male  Who was admitted to the hospital on 8/11/2020 for treatment of reversal of  ileostomy. Patient had severe abdominal pain on 6/2/2020 and underwent emergent laparotomy, lysis of adhesions, diverting loop ileostomy drainage of subhepatic abscess on 6/12/2020 for pneumoperitoneum. Patient was discharged on TPN at that time. He has history of ascending colon adenocarcinoma. Patient is otherwise healthy. He has history of IBS. Patient also had postop A. fib on a second bowel surgery with spontaneous conversion to NSR. Patient is not on long-term beta-blockers for anticoagulation. Patient underwent reversal of ileostomy on 8/11/2020 without any complications. PMH:  Past Medical History:   Diagnosis Date    [facility] ([facility]     Adenocarcinoma ([facility]  Colon 6/17/2020    Anxiety     Cancer (HCC)     basal cell forehead    Colon polyps     [place] (hard of hearing)     Hx of cold sores     IBS (irritable bowel syndrome)     Kidney stone     Neuropathy     PTSD (post-traumatic stress disorder)     Wears glasses       Allergies:    Allergies GGT, ALKPHOS, BILITOT, BILIDIR, AMMONIA, AMYLASE, LIPASE, LACTATE, CHOL, HDL, LDLCHOLESTEROL, CHOLHDLRATIO, TRIG, VLDL, BNP, TROPONINI, CKTOTAL, CKMB, CKMBINDEX, RF, MITCH in the last 72 hours. No results found for: Alyse Loza, [address], BLOODU, BACTERIA, NITRU, [address], LEUKOCYTESUR    Imaging / Clinical Data :-   No results found. Clinical Course : stable  Assessment and Plan  :        1. H/o Focal ascending adenocarcinoma s/p resection of tumor May 2020 -   2. H/o diverting ileostomy for subhepatic abscess 06/2020 - s/p reversal of ileostomy  3. H/O Post Op  Afib -  check EKG   4. IBS     Pain control with IV opioid medications. Encourage ambulation  DVT prophylaxis  Activity as tolerated. Awaiting return of bowel function. Continue IV fluids. Diet per general surgery. Zofran IV for hiccups. Discussed with general surgery resident Dr. Nicole Naranjo. Check EKG - irregular Heart rhythm    Continue to monitor vitals , Intake / output ,  Cell count , HGB , Kidney function, oxygenation  as indicated . Plan and updates discussed with patient ,  answers  explained to satisfaction.    Plan discussed with Staff  RN     (Please note that portions of this note were completed with a voice recognition program. Efforts were made to edit the dictations but occasionally words are mis-transcribed.)      Jerilyn Cho MD  8/13/2020

## 2020-08-13 NOTE — PLAN OF CARE
Problem: Pain:  Goal: Control of acute pain  Description: Control of acute pain  8/13/2020 1544 by Bryan Alvarez RN  Outcome: Ongoing  Note: Pain level assessed every four hours; patient reports surgical site  pain rated at a level of  6 out of 10 radiating to surgical site. Activities tried to relieve pain; walking, repositioning and pain medications given as prescribed for pain relief. Pain relief: patient satisfied. 8/13/2020 1539 by Bryan Alvarez RN  Outcome: Ongoing  8/13/2020 0306 by Will Deng RN  Outcome: Ongoing     Problem: Pain:  Goal: Control of acute pain  Description: Control of acute pain  8/13/2020 1539 by Bryan Alvarez RN  Outcome: Ongoing     Problem: Falls - Risk of:  Goal: Will remain free from falls  Description: Will remain free from falls  8/13/2020 1544 by Bryan Alvarez RN  Outcome: Ongoing  Note: Siderails up x 2  Hourly rounding. Call light in reach. Instructed to call for assist before attempting out of bed. Remains free from falls and accidental injury at this time. Floor free from obstacles, and bed is locked and in lowest position. Adequate lighting provided. Bed alarm on. Fall sticker on wristband. Fall Sign posted in doorway   8/13/2020 1539 by Bryan Alvarez RN  Outcome: Ongoing  Note: Siderails up x 2  Hourly rounding. Call light in reach. Instructed to call for assist before attempting out of bed. Remains free from falls and accidental injury at this time. Floor free from obstacles, and bed is locked and in lowest position. Adequate lighting provided. Bed alarm on. Fall sticker on wristband. Fall Sign posted in doorway   8/13/2020 0306 by Will Deng RN  Outcome: Ongoing     Problem: Falls - Risk of:  Goal: Will remain free from falls  Description: Will remain free from falls  8/13/2020 1539 by Bryan Alvarez RN  Outcome: Ongoing  Note: Siderails up x 2  Hourly rounding. Call light in reach.   Instructed to call for assist before attempting out of bed. Remains free from falls and accidental injury at this time. Floor free from obstacles, and bed is locked and in lowest position. Adequate lighting provided. Bed alarm on. Fall sticker on wristband.  Fall Sign posted in doorway

## 2020-08-13 NOTE — CARE COORDINATION
Discharge planning    Patient chart reviewed. Appreciate prior ED SS initial  assessment. Per SS patient lives with wife and was getting [facility] in the home for ileostomy care. He is now admitted with reversal. He is wanting to continue with his current home care agency. GEN SURGERY  PLAN  1. Neuro- Analgesia multimodal pain therapy  2. CV- continue to monitor BP/HR, ok to resume ASA 81mg at this time if needed   3. Resp- continue to encourage incentive spirometry and deep breathing every hour while awake per RT and RN  4. GI- Diet NPO, ice chips and popsicles ok, monitor for bowel function, Wound care- dry dressing prn, ok to shower , repack and dress wound with fluff   5. Renal- jayne UOP, accurate I/Os, replace electrolytes PRN, IVF D5  6. Msk- continue to encourage ambulation/activity, PT/OT eval and Tx   7. ID-received 24hr Abx, jayne for fevers  8. Ppx- ok for DVT ppx- lovenox, GI ppx  9.  Dispo planning: Await return of bowel function and toleration of diet

## 2020-08-13 NOTE — DISCHARGE INSTR - COC
Continuity of Care Form    Patient Name: Mati Cadena   :  1950  MRN:  [id number]    Admit date:  2020  Discharge date:  08/15/2020    Code Status Order: Full Code   Advance Directives:   Advance Care Flowsheet Documentation     Date/Time Healthcare Directive Type of Healthcare Directive Copy in [address] Agent's Name Healthcare Agent's Phone Number    20 1147  No, patient does not have an advance directive for healthcare treatment -- -- -- -- --    20 1145  No, patient does not have an advance directive for healthcare treatment -- -- -- -- --          Admitting Physician:  Eryn Lacy MD  PCP: Juni Perez MD    Discharging Nurse: [facility] Unit/Room#: 1007/1007-02  Discharging Unit Phone Number: [phone]    Emergency Contact:   Extended Emergency Contact Information  Primary Emergency Contact: Bradley Leal [facility]. Phone: [phone]  Work Phone: [phone]  Mobile Phone: [phone]  Relation: Spouse  Preferred language: English   needed? No    Past Surgical History:  Past Surgical History:   Procedure Laterality Date    COLON SURGERY      COLONOSCOPY      2020    COSMETIC SURGERY      forehead scar revision    HEMICOLECTOMY N/A 2020    ABDOMINAL EXPLORATION WITH EXCISION OF TRANSVERSE COLON/RIGHT COLECTOMY performed by Eryn Lacy MD at [place] [address] N/A 2020    LAPAROTOMY EXPLORATORY LYSIS OF ADHESIONS REPAIR OF SEROSAL TEARS DRAINAGE OF SUBHEPATIC ABSCESS LOOP ILEOSTOMY CREATION performed by Eryn Lacy MD at [place] [address] N/A 2020    ILEOSTOMY CLOSURE performed by rEyn Lacy MD at [address] [place] [address]         Immunization History: There is no immunization history on file for this patient.     Active Problems:  Patient Active Problem List   Diagnosis Code    Colon polyps K63.5    Anxiety F41.9    Wears glasses Z97.3    PTSD (post-traumatic stress disorder) F43.10    Neuropathy G62.9    Intra-abdominal abscess (HCC) K65.1    Essential hypertension I10    Hyperlipidemia E78.5    Tobacco use disorder F17.200    Pneumoperitoneum K66.8    Postoperative atrial fibrillation (HCC) I97.89, I48.91    Severe malnutrition (HCC) B55    Neutrophilic leukocytosis X87.7    Ileostomy status (HCC) Z93.2    Hypocalcemia E83.51    Hypoalbuminemia due to protein-calorie malnutrition (HCC) E46    Normocytic normochromic anemia D64.9    Paroxysmal atrial fibrillation (HCC) I48.0    Adenocarcinoma (HCC)  Colon C80.1    History of reversal of ileostomy Z98.890       Isolation/Infection:   Isolation          No Isolation        Patient Infection Status     Infection Onset Added Last Indicated Last Indicated By Review Planned Expiration Resolved Resolved By    None active    Resolved    COVID-19 Rule Out 08/06/20 08/06/20 08/07/20 Covid-19 Ambulatory (Ordered)   08/10/20 Rule-Out Test Resulted    COVID-19 Rule Out 05/22/20 05/22/20 05/26/20 COVID-19 (Ordered)   05/26/20 Rule-Out Test Resulted          Nurse Assessment:  Last Vital Signs: /61   Pulse 74   Temp 98.1 °F (36.7 °C) (Oral)   Resp 16   Ht 5' 10\" (1.778 m)   Wt 165 lb 5.5 oz (75 kg)   SpO2 98%   BMI 23.72 kg/m²     Last documented pain score (0-10 scale): Pain Level: 6  Last Weight:   Wt Readings from Last 1 Encounters:   08/11/20 165 lb 5.5 oz (75 kg)     Mental Status:  oriented and alert    IV Access:  - None    Nursing Mobility/ADLs:  Walking   Independent  Transfer  Independent  Bathing  Independent  Dressing  Independent  Toileting  Independent  Feeding  Independent  Med Admin  Independent  Med Delivery   whole    Wound Care Documentation and Therapy:        Elimination:  Continence:   · Bowel: Yes  · Bladder: Yes  Urinary Catheter: None   Colostomy/Ileostomy/Ileal Conduit: ileostomy reversal (staples in place).         Date of Last BM: 08/14/2020    Intake/Output Summary (Last 24 hours) at 8/13/2020 1534  Last data filed at 8/13/2020 1251  Gross per 24 hour   Intake 4913.67 ml   Output 1260 ml   Net 3653.67 ml     I/O last 3 completed shifts: In: 4913.7 [P.O.:160; I.V.:4753.7]  Out: 1260 [Urine:1260]    Safety Concerns:     None    Impairments/Disabilities:      None    Nutrition Therapy:  Current Nutrition Therapy:   - Oral Diet:  General    Routes of Feeding: Oral  Liquids: No Restrictions  Daily Fluid Restriction: no  Last Modified Barium Swallow with Video (Video Swallowing Test): not done    Treatments at the Time of Hospital Discharge:   Respiratory Treatments: na  Oxygen Therapy:  is not on home oxygen therapy. Ventilator:    - No ventilator support    Rehab Therapies: Physical Therapy and Occupational Therapy  Weight Bearing Status/Restrictions: No weight bearing restirctions  Other Medical Equipment (for information only, NOT a DME order): Other Treatments:   1. Skilled RN assessment   2.   Medication reconciliation     Patient's personal belongings (please select all that are sent with patient):  Glasses    RN SIGNATURE:  Electronically signed by Carleen Cronin RN on 8/14/20 at 3:29 PM EDT    CASE MANAGEMENT/SOCIAL WORK SECTION    Inpatient Status Date: 8/11    Readmission Risk Assessment Score:  Readmission Risk              Risk of Unplanned Readmission:        9           Discharging to Facility/ Agency   · Name: [facility]  · Phone: [phone]  · Fax: [phone]    Dialysis Facility (if applicable)   · Name:  · Address:  · Dialysis Schedule:  · Phone:  · Fax:    / signature: Electronically signed by Betty Link RN on 8/13/20 at 3:36 PM EDT    PHYSICIAN SECTION    Prognosis: Good    Condition at Discharge: Stable    Rehab Potential (if transferring to Rehab): Good    Recommended Labs or Other Treatments After Discharge: wound Care     Physician Certification: I certify the above information and transfer of Kevan Vazquez  is necessary for the continuing treatment of the diagnosis listed and that he requires [address] for less 30 days.      Update Admission H&P: No change in H&P    PHYSICIAN SIGNATURE:  Electronically signed by Josselin Knox MD on 8/15/20 at 9:27 AM EDT

## 2020-08-13 NOTE — PROGRESS NOTES
Limits  Objective    ADL  Grooming: Independent;Setup  UE Bathing: Supervision  LE Bathing: Stand by assistance  UE Dressing: Supervision  LE Dressing: Stand by assistance  Toileting: Supervision  Additional Comments: Patient completed shower this date with [address] HEX surgical solution with shower bench to increase (I) and safety with occupational performance. Patient is able to cross BLE's on knees for LBB and LBD to maintain surgical precautions        Balance  Sitting Balance: Modified independent   Standing Balance: Supervision  Standing Balance  Time: standing tolerance ~10 minutes  Activity: grooming at sink after shower  Functional Mobility  Functional - Mobility Device: No device  Activity: To/from bathroom  Assist Level: Supervision  Functional Mobility Comments: Patient completed functional mobility from bathroom with good safety awareness, pacing, and posture. Shower Transfers  Shower - Transfer Type: To  Shower - Transfer To:  Transfer tub bench  Shower - Technique: Ambulating  Shower Transfers: Modified independence  Bed mobility  Comment: Patient up in shower upon arrival  Transfers  Sit to stand: Modified independent  Stand to sit: Modified independent                       Cognition  Overall Cognitive Status: [address] [place] [address] 114  Times per week: 4-5x/week, 1-2x/day  Current Treatment Recommendations: Patient/Caregiver Education & Training, Self-Care / ADL, Safety Education & Training, Functional Mobility Training, Balance Training, Equipment Evaluation, Education, & procurement, Pain Management    AM-PAC Score        AM-PAC Inpatient Daily Activity Raw Score: 19 (08/13/20 1021)  AM-PAC Inpatient ADL T-Scale Score : 40.22 (08/13/20 1021)  ADL Inpatient CMS 0-100% Score: 42.8 (08/13/20 1021)  ADL Inpatient CMS G-Code Modifier : CK (08/13/20 1021)    Goals  Short term goals  Time Frame for Short term goals: by d/c, pt will  Short term goal 1: demo S with ADL transfers with good safety and approp AD/DME  Short term goal 2: demo S with functional mobility with good safety and pacing  Short term goal 3: demo MI/I with UB ADLs and Min A LB ADLs with good safety and AD/DME as needed  Short term goal 4: demo MI/I with toileting routine with good safety  Short term goal 5: demo and verb good understanding of abdomen precautions and possible equip needs  Patient Goals   Patient goals : To reduce cramping pain       Therapy Time   Individual Concurrent Group Co-treatment   Time In 1019         Time Out 1054         Minutes 35           Upon writer exit, call light within reach, pt retired sitting up in bed. All lines intact and patient positioned comfortably. All patient needs addressed prior to ending therapy session. Chart reviewed prior to treatment and patient is agreeable for therapy. RN reports patient is medically stable for therapy treatment this date.       PUNEET Fried/MELODY

## 2020-08-13 NOTE — PLAN OF CARE
Problem: Pain:  Goal: Pain level will decrease  Description: Pain level will decrease  Outcome: Ongoing  Note: Assess for pain using pain scale 0-10. Medicated PRN as ordered. Nonpharmacologic interventions. Continuing to monitor.   Goal: Control of acute pain  Description: Control of acute pain  Outcome: Ongoing  Goal: Control of chronic pain  Description: Control of chronic pain  Outcome: Ongoing

## 2020-08-13 NOTE — PROGRESS NOTES
Surgery Progress Note       PATIENT NAME: Hector Pickering     TODAY'S DATE: 8/13/2020    SUBJECTIVE:    Pt seen and examined. No issues overnight. Denies any f/c, n/v, sob or chest pain. + flatus, no BM yet. Tmax 37    OBJECTIVE:   Vitals:  /64   Pulse 52   Temp 98.6 °F (37 °C) (Axillary)   Resp 16   Ht 5' 10\" (1.778 m)   Wt 165 lb 5.5 oz (75 kg)   SpO2 97%   BMI 23.72 kg/m²      INTAKE/OUTPUT:      Intake/Output Summary (Last 24 hours) at 8/13/2020 0701  Last data filed at 8/13/2020 0531  Gross per 24 hour   Intake 4783.67 ml   Output 1035 ml   Net 3748.67 ml                 General: AOx3, NAD  Lungs: Symmetrical chest rise bilaterally, no audible wheezes or rhonchi  Heart: S1S2  Abdomen: Soft, ND, appropriately tender, non peritoneal, no rebound, incision clean and dry, packing removed   Extremity: moves all extremities x4, No edema    Data Review:  CBC:   Recent Labs     08/12/20  0543   WBC 14.8*   HGB 13.8        BMP:    Recent Labs     08/12/20  0543      K 4.7      CO2 26   BUN 11   CREATININE 0.75   GLUCOSE 126*     Hepatic: No results for input(s): AST, ALT, ALB, ALKPHOS, BILITOT, BILIDIR in the last 72 hours. Coagulation: No results for input(s): APTT, PROT, INR in the last 72 hours.     ASSESSMENT     Patient Active Problem List   Diagnosis    Colon polyps    Anxiety    Wears glasses    PTSD (post-traumatic stress disorder)    Neuropathy    [facility]-abdominal abscess ([facility] [address]    Essential hypertension    Hyperlipidemia    Tobacco use disorder    Pneumoperitoneum    Postoperative atrial fibrillation (HCC)    Severe malnutrition (HCC)    Neutrophilic leukocytosis    Ileostomy status (HCC)    Hypocalcemia    Hypoalbuminemia due to protein-calorie malnutrition (HCC)    Normocytic normochromic anemia    Paroxysmal atrial fibrillation (HCC)    Adenocarcinoma (HCC)  Colon    History of reversal of ileostomy   68 yo M s/p 8/11/20 ileostomy reversal    PLAN  1. Neuro- Analgesia multimodal pain therapy  2. CV- continue to monitor BP/HR, ok to resume ASA 81mg at this time if needed   3. Resp- continue to encourage incentive spirometry and deep breathing every hour while awake per RT and RN  4. GI- Diet NPO, ice chips and popsicles ok, monitor for bowel function, Wound care- dry dressing prn, ok to shower , repack and dress wound with fluff   5. Renal- jayne UOP, accurate I/Os, replace electrolytes PRN, IVF D5  6. Msk- continue to encourage ambulation/activity, PT/OT eval and Tx   7. ID-received 24hr Abx, jayne for fevers  8. Ppx- ok for DVT ppx- lovenox, GI ppx  9. Dispo planning: Await return of bowel function and toleration of diet    Thank you,    Electronically signed by Lea Delgadillo DO  on 8/13/2020 at 7:01 AM    I Dr. Bharat Mac saw and examined the patient. I have edited the above and agree with the above. Alejandra Wilson  Colorectal Surgery

## 2020-08-14 LAB
ABSOLUTE EOS #: 0.46 K/UL (ref 0–0.44)
ABSOLUTE IMMATURE GRANULOCYTE: 0.05 K/UL (ref 0–0.3)
ABSOLUTE LYMPH #: 2.15 K/UL (ref 1.1–3.7)
ABSOLUTE MONO #: 1 K/UL (ref 0.1–1.2)
ANION GAP SERPL CALCULATED.3IONS-SCNC: 10 MMOL/L (ref 9–17)
BASOPHILS # BLD: 1 % (ref 0–2)
BASOPHILS ABSOLUTE: 0.05 K/UL (ref 0–0.2)
BUN BLDV-MCNC: 7 MG/DL (ref 8–23)
BUN/CREAT BLD: 12 (ref 9–20)
CALCIUM SERPL-MCNC: 8.2 MG/DL (ref 8.6–10.4)
CHLORIDE BLD-SCNC: 103 MMOL/L (ref 98–107)
CO2: 23 MMOL/L (ref 20–31)
CREAT SERPL-MCNC: 0.6 MG/DL (ref 0.7–1.2)
DIFFERENTIAL TYPE: ABNORMAL
EOSINOPHILS RELATIVE PERCENT: 4 % (ref 1–4)
GFR AFRICAN AMERICAN: >60 ML/MIN
GFR NON-AFRICAN AMERICAN: >60 ML/MIN
GFR SERPL CREATININE-BSD FRML MDRD: ABNORMAL ML/MIN/{1.73_M2}
GFR SERPL CREATININE-BSD FRML MDRD: ABNORMAL ML/MIN/{1.73_M2}
GLUCOSE BLD-MCNC: 104 MG/DL (ref 70–99)
HCT VFR BLD CALC: 42.5 % (ref 40.7–50.3)
HEMOGLOBIN: 13.4 G/DL (ref 13–17)
IMMATURE GRANULOCYTES: 1 %
LYMPHOCYTES # BLD: 20 % (ref 24–43)
MAGNESIUM: 1.8 MG/DL (ref 1.6–2.6)
MCH RBC QN AUTO: 28.6 PG (ref 25.2–33.5)
MCHC RBC AUTO-ENTMCNC: 31.5 G/DL (ref 28.4–34.8)
MCV RBC AUTO: 90.8 FL (ref 82.6–102.9)
MONOCYTES # BLD: 9 % (ref 3–12)
NRBC AUTOMATED: 0 PER 100 WBC
PDW BLD-RTO: 14.6 % (ref 11.8–14.4)
PHOSPHORUS: 2.9 MG/DL (ref 2.5–4.5)
PLATELET # BLD: 190 K/UL (ref 138–453)
PLATELET ESTIMATE: ABNORMAL
PMV BLD AUTO: 10.8 FL (ref 8.1–13.5)
POTASSIUM SERPL-SCNC: 4 MMOL/L (ref 3.7–5.3)
RBC # BLD: 4.68 M/UL (ref 4.21–5.77)
RBC # BLD: ABNORMAL 10*6/UL
SEG NEUTROPHILS: 65 % (ref 36–65)
SEGMENTED NEUTROPHILS ABSOLUTE COUNT: 7.06 K/UL (ref 1.5–8.1)
SODIUM BLD-SCNC: 136 MMOL/L (ref 135–144)
WBC # BLD: 10.8 K/UL (ref 3.5–11.3)
WBC # BLD: ABNORMAL 10*3/UL

## 2020-08-14 PROCEDURE — 83735 ASSAY OF MAGNESIUM: CPT

## 2020-08-14 PROCEDURE — 85025 COMPLETE CBC W/AUTO DIFF WBC: CPT

## 2020-08-14 PROCEDURE — 6360000002 HC RX W HCPCS: Performed by: STUDENT IN AN ORGANIZED HEALTH CARE EDUCATION/TRAINING PROGRAM

## 2020-08-14 PROCEDURE — 99232 SBSQ HOSP IP/OBS MODERATE 35: CPT | Performed by: FAMILY MEDICINE

## 2020-08-14 PROCEDURE — 6370000000 HC RX 637 (ALT 250 FOR IP): Performed by: STUDENT IN AN ORGANIZED HEALTH CARE EDUCATION/TRAINING PROGRAM

## 2020-08-14 PROCEDURE — 84100 ASSAY OF PHOSPHORUS: CPT

## 2020-08-14 PROCEDURE — 36415 COLL VENOUS BLD VENIPUNCTURE: CPT

## 2020-08-14 PROCEDURE — 2500000003 HC RX 250 WO HCPCS: Performed by: STUDENT IN AN ORGANIZED HEALTH CARE EDUCATION/TRAINING PROGRAM

## 2020-08-14 PROCEDURE — 2580000003 HC RX 258: Performed by: STUDENT IN AN ORGANIZED HEALTH CARE EDUCATION/TRAINING PROGRAM

## 2020-08-14 PROCEDURE — 97116 GAIT TRAINING THERAPY: CPT

## 2020-08-14 PROCEDURE — 2060000000 HC ICU INTERMEDIATE R&B

## 2020-08-14 PROCEDURE — 80048 BASIC METABOLIC PNL TOTAL CA: CPT

## 2020-08-14 RX ADMIN — POTASSIUM CHLORIDE, DEXTROSE MONOHYDRATE AND SODIUM CHLORIDE: 150; 5; 450 INJECTION, SOLUTION INTRAVENOUS at 07:16

## 2020-08-14 RX ADMIN — POTASSIUM CHLORIDE, DEXTROSE MONOHYDRATE AND SODIUM CHLORIDE: 150; 5; 450 INJECTION, SOLUTION INTRAVENOUS at 15:15

## 2020-08-14 RX ADMIN — ACETAMINOPHEN 1000 MG: 500 TABLET ORAL at 13:42

## 2020-08-14 RX ADMIN — MORPHINE SULFATE 4 MG: 4 INJECTION, SOLUTION INTRAMUSCULAR; INTRAVENOUS at 04:00

## 2020-08-14 RX ADMIN — Medication 10 ML: at 21:34

## 2020-08-14 RX ADMIN — OXYCODONE HYDROCHLORIDE 5 MG: 5 TABLET ORAL at 21:39

## 2020-08-14 RX ADMIN — OXYCODONE HYDROCHLORIDE 5 MG: 5 TABLET ORAL at 01:55

## 2020-08-14 RX ADMIN — ACETAMINOPHEN 1000 MG: 500 TABLET ORAL at 06:21

## 2020-08-14 RX ADMIN — CLONAZEPAM 0.5 MG: 0.5 TABLET ORAL at 07:37

## 2020-08-14 RX ADMIN — ACETAMINOPHEN 1000 MG: 500 TABLET ORAL at 21:34

## 2020-08-14 RX ADMIN — OXYCODONE HYDROCHLORIDE 5 MG: 5 TABLET ORAL at 11:47

## 2020-08-14 RX ADMIN — OXYCODONE HYDROCHLORIDE 5 MG: 5 TABLET ORAL at 07:37

## 2020-08-14 RX ADMIN — ENOXAPARIN SODIUM 40 MG: 40 INJECTION SUBCUTANEOUS at 07:36

## 2020-08-14 RX ADMIN — CLONAZEPAM 0.5 MG: 0.5 TABLET ORAL at 21:34

## 2020-08-14 ASSESSMENT — PAIN DESCRIPTION - ONSET
ONSET: ON-GOING
ONSET: GRADUAL

## 2020-08-14 ASSESSMENT — PAIN SCALES - GENERAL
PAINLEVEL_OUTOF10: 6
PAINLEVEL_OUTOF10: 3
PAINLEVEL_OUTOF10: 7
PAINLEVEL_OUTOF10: 4
PAINLEVEL_OUTOF10: 3
PAINLEVEL_OUTOF10: 5
PAINLEVEL_OUTOF10: 2
PAINLEVEL_OUTOF10: 7
PAINLEVEL_OUTOF10: 7
PAINLEVEL_OUTOF10: 4
PAINLEVEL_OUTOF10: 2

## 2020-08-14 ASSESSMENT — PAIN DESCRIPTION - PROGRESSION
CLINICAL_PROGRESSION: GRADUALLY WORSENING
CLINICAL_PROGRESSION: NOT CHANGED

## 2020-08-14 ASSESSMENT — ENCOUNTER SYMPTOMS
COUGH: 0
SHORTNESS OF BREATH: 0
CHOKING: 0
BACK PAIN: 0
VOICE CHANGE: 0
CHEST TIGHTNESS: 0
CONSTIPATION: 0
RHINORRHEA: 0
DIARRHEA: 0
ABDOMINAL PAIN: 1
SINUS PRESSURE: 0
VOMITING: 0
NAUSEA: 0
WHEEZING: 0

## 2020-08-14 ASSESSMENT — PAIN DESCRIPTION - LOCATION
LOCATION: ABDOMEN
LOCATION: ABDOMEN
LOCATION: ABDOMEN;RECTUM
LOCATION: ABDOMEN
LOCATION: ABDOMEN

## 2020-08-14 ASSESSMENT — PAIN - FUNCTIONAL ASSESSMENT
PAIN_FUNCTIONAL_ASSESSMENT: ACTIVITIES ARE NOT PREVENTED
PAIN_FUNCTIONAL_ASSESSMENT: PREVENTS OR INTERFERES SOME ACTIVE ACTIVITIES AND ADLS

## 2020-08-14 ASSESSMENT — PAIN DESCRIPTION - DESCRIPTORS
DESCRIPTORS: ACHING
DESCRIPTORS: ACHING

## 2020-08-14 ASSESSMENT — PAIN DESCRIPTION - PAIN TYPE
TYPE: SURGICAL PAIN;ACUTE PAIN
TYPE: SURGICAL PAIN

## 2020-08-14 ASSESSMENT — PAIN DESCRIPTION - FREQUENCY
FREQUENCY: INTERMITTENT
FREQUENCY: CONTINUOUS

## 2020-08-14 NOTE — PLAN OF CARE
Pain level assessment complete. Patient educated on pain scale and control interventions  PRN pain medication given per patient request  Patient instructed to call out with new onset of pain or unrelieved pain    Siderails up x 2  Hourly rounding  Call light in reach  Instructed to call for assist before attempting out of bed.   Remains free from falls and accidental injury at this time   Floor free from obstacles  Bed is locked and in lowest position  Adequate lighting provided  Bed alarm on, [place] star and Stay with Me signs posted

## 2020-08-14 NOTE — PROGRESS NOTES
Physical Therapy  Facility/Department: [facility] PROGRESSIVE CARE  Daily Treatment Note  NAME: Ba Fragoso  : 1950  MRN: [id number]    Date of Service: 2020    Discharge Recommendations:  Home with assist PRN          Assessment   Body structures, Functions, Activity limitations: Decreased functional mobility ; Decreased endurance;Decreased balance  Assessment: Pt tolerated treatment well with minor deficits of functional mobility & balance. Pt to benefit from cont'd IP PT to maximize indep with functional mobility, balance, & safety awareness. Recommened D/C to home with assist PRN. Prognosis: Good  Exam: functional mobility, activity tolerance, balance, gait, & [employer] AM-PAC 6 Clicks Basic Mobility  Clinical Presentation: stable  PT Education: Transfer Training;General Safety;Home Exercise Program;Precautions;Gait Training;Functional Mobility Training  Patient Education: Pt issued abd precautions handout. REQUIRES PT FOLLOW UP: Yes  Activity Tolerance  Activity Tolerance: Patient Tolerated treatment well     Patient Diagnosis(es): The encounter diagnosis was Surgery, elective. has a past medical history of A-fib ([facility] [facility] Adenocarcinoma ([facility] [facility]  Colon, Anxiety, Cancer ([facility] [facility] Colon polyps, [place] (hard of hearing), Hx of cold sores, IBS (irritable bowel syndrome), Kidney stone, Neuropathy, PTSD (post-traumatic stress disorder), and Wears glasses. has a past surgical history that includes Colonoscopy; Cosmetic surgery; Tooth Extraction; hemicolectomy (N/A, 2020); Colon surgery; laparotomy (N/A, 2020); and Small intestine surgery (N/A, 2020).     Restrictions  Restrictions/Precautions  Restrictions/Precautions: General Precautions, Fall Risk  Required Braces or Orthoses?: Yes  Required Braces or Orthoses  Other: Abdominal Binder  Position Activity Restriction  Other position/activity restrictions: NPO except ice chips, sips with meds, Telemetry, up as tolerated  Subjective General  Chart Reviewed: Yes  Subjective  Subjective: RN okays PT  General Comment  Comments: Pt agreeable to PT          Orientation  Orientation  Overall Orientation Status: Within Normal Limits  Cognition   Cognition  Overall Cognitive Status: WFL  Objective   Bed mobility  Rolling to Right: Stand by assistance  Supine to Sit: Contact guard assistance  Sit to Supine: Stand by assistance  Comment: Pt required additonal time to complete. Transfers  Sit to Stand: Supervision  Stand to sit: Supervision  Bed to Chair: Supervision  Lateral Transfers: Supervision  Ambulation  Ambulation?: Yes  Ambulation 1  Surface: level tile  Device: No Device  Assistance: Supervision  Quality of Gait: step through pattern  Distance: 200 ft  Stairs/Curb  Stairs?: Yes  Stairs  # Steps : 3  Stairs Height: 6\"  Rails: Bilateral  Device: No Device  Assistance: Contact guard assistance  Comment: Pt had no LOB with gait, just slow and careful. Balance  Posture: Good  Sitting - Static: Good  Sitting - Dynamic: Good  Standing - Static: Good  Standing - Dynamic: Fair;+    Ed on abd precautions, bed mobility with abd precautions, post op restrictions & safety principles, prevention sedentary complications & home walking program(issued written pt education)              All lines intact, call light within reach, and patient positioned comfortably at end of treatment. All patient needs addressed prior to ending therapy session. G-Code     OutComes Score                                                     AM-PAC Score  AM-PAC Inpatient Mobility Raw Score : 21 (08/14/20 1455)  AM-PAC Inpatient T-Scale Score : 50.25 (08/14/20 1455)  Mobility Inpatient CMS 0-100% Score: 28.97 (08/14/20 1455)  Mobility Inpatient CMS G-Code Modifier : CJ (08/14/20 1455)          Goals  Short term goals  Time Frame for Short term goals: 12 vists  Short term goal 1: Inc amb 300ft indep.   Short term goal 2: Pt to be able to go up/down 4 steps with bi railings. Short term goal 3: Inc bed mobility & transfers to [facility] to enable pt to safely get in/OOB & return to PLOF & decrease risk for falls  Short term goal 4: Pt to tolerate 30 min of ex & gait to facilitate activity tolerance to [facility]. Short term goal 5: Ed pt on abd precautions, HEP, & safety & energy principles & issue HEP.     Plan    Plan  Times per week: 1-2x day 5-6 wk  Current Treatment Recommendations: Transfer Training, Endurance Training, Patient/Caregiver Education & Training, Balance Training, Functional Mobility Training, Stair training, Gait Training, Home Exercise Program, Safety Education & Training  Safety Devices  Type of devices: Call light within reach, Gait belt, Left in bed     Therapy Time   Individual Concurrent Group Co-treatment   Time In 1422         Time Out 1448         Minutes [address] [facility]          Treatment & documentation completed by Eleni Wolfe, Student DPT  Directly Supervised & co-signed by Francy Alvarado, PT

## 2020-08-14 NOTE — PROGRESS NOTES
[address]      Daily Progress Note     Admit Date: 8/11/2020  Bed/Room No.  1007/1007-02  Admitting Physician : Gibran Valdez MD  Code Status :Full Code  Hospital Day:  LOS: 3 days   Chief Complaint:     Reversal of ileostomy     Active Problems:    History of reversal of ileostomy  Resolved Problems:    * No resolved hospital problems. *    Subjective : Interval History/Significant events :  08/14/20    Patient reports improvement in abdominal pain. He is passing flatus. Patient had small bowel movement. He is hungry and wants to eat. He denies any difficulty breathing. Abdominal pain is mild to moderate. Vitals - Stable afebrile  Labs -creatinine 0.6. Nursing notes , Consults notes reviewed. Overnight events and updates discussed with Nursing staff . Background History:         Arnett Snellen is 69 y.o. male  Who was admitted to the hospital on 8/11/2020 for treatment of reversal of  ileostomy. Patient had severe abdominal pain on 6/2/2020 and underwent emergent laparotomy, lysis of adhesions, diverting loop ileostomy drainage of subhepatic abscess on 6/12/2020 for pneumoperitoneum. Patient was discharged on TPN at that time. He has history of ascending colon adenocarcinoma. Patient is otherwise healthy. He has history of IBS. Patient also had postop A. fib on a second bowel surgery with spontaneous conversion to NSR. Patient is not on long-term beta-blockers for anticoagulation. Patient underwent reversal of ileostomy on 8/11/2020 without any complications. PMH:  Past Medical History:   Diagnosis Date    [facility]     Adenocarcinoma ([facility] [address]  Colon 6/17/2020    Anxiety     Cancer (HCC)     basal cell forehead    Colon polyps     [place] (hard of hearing)     Hx of cold sores     IBS (irritable bowel syndrome)     Kidney stone     Neuropathy     PTSD (post-traumatic stress disorder)     Wears glasses       Allergies:    Allergies   Allergen Reactions    [address] Fr[address] Driscoll, KHUSHI    Imaging / Clinical Data :-   No results found. Clinical Course : stable  Assessment and Plan  :        1. H/o Focal ascending adenocarcinoma s/p resection of tumor May 2020 -   2. H/o diverting ileostomy for subhepatic abscess 06/2020 - s/p reversal of ileostomy. Patient tolerating clears , advance diet per gen surgery, check BMP and mag, replace electrolytes as indicated. Encourage ambulation, incentive spirometry. Recommend to decrease pain medication intake. Discontinue morphine  3. H/O Post Op  Afib -in normal sinus rhythm with PAC. 4. IBS         Continue to monitor vitals , Intake / output ,  Cell count , HGB , Kidney function, oxygenation  as indicated . Plan and updates discussed with patient ,  answers  explained to satisfaction.    Plan discussed with Staff  RN     (Please note that portions of this note were completed with a voice recognition program. Efforts were made to edit the dictations but occasionally words are mis-transcribed.)      Ismael Charles MD  8/14/2020

## 2020-08-14 NOTE — PLAN OF CARE
Problem: Pain:  Goal: Pain level will decrease  Description: Pain level will decrease  Outcome: Met This Shift  Note: Patient's pain has been well controlled throughout the entire shift, please see MAR. Problem: Falls - Risk of:  Goal: Will remain free from falls  Description: Will remain free from falls  Outcome: Met This Shift  Note: The patient remained free from falls this shift, call light within reach, bed in locked and lowest position. Side rails up x2. Continue to monitor closely. Problem: Skin Integrity:  Goal: Will show no infection signs and symptoms  Description: Will show no infection signs and symptoms  Outcome: Ongoing  Note: The patient had abdominal surgery, ileostomy reversal, performed, no fever, no redness or pain at the site; just slight tenderness. Problem: MOBILITY  Goal: Early mobilization is achieved  Outcome: Met This Shift  Note: The patient was able to ambulate independently without any difficulties.

## 2020-08-14 NOTE — PROGRESS NOTES
reversal    PLAN  1. Neuro- Analgesia multimodal pain therapy  2. CV- continue to monitor BP/HR, ok to resume ASA 81mg at this time if needed   3. Resp- continue to encourage incentive spirometry and deep breathing every hour while awake per RT and RN  4. GI- CLD, will discuss adv to FLD, monitor for bowel function, Wound care- dry dressing prn, ok to shower , repack and dress wound with fluff   5. Renal- jayne UOP, accurate I/Os, replace electrolytes PRN, IVF D5  6. Msk- continue to encourage ambulation/activity, PT/OT eval and Tx   7. ID-received 24hr Abx, jayne for fevers  8. Ppx- ok for DVT ppx- lovenox, GI ppx  9. Dispo planning: Await return of bowel function and toleration of diet, discharge possible next 24 to 48 hours     Thank you,    Electronically signed by Dena Marroquin DO  on 8/14/2020 at 7:04 AM     I Dr. Efe James saw and examined the patient. I have edited the above and agree with the above. Alejandra Wilson  Colorectal Surgery Nsaids Pregnancy And Lactation Text: These medications are considered safe up to 30 weeks gestation. It is excreted in breast milk.

## 2020-08-14 NOTE — PROGRESS NOTES
Occupational Therapy  DATE: 2020    NAME: Ketty Ann  MRN: [id number]   : 1950  [facility]  Occupational Therapy Not Seen Note    Patient not available for Occupational Therapy due to:    [] Testing:    [] Hemodialysis    [] Cancelled by RN:    [x]Refusal by Patient:Patient stated he has all equipment needs at home, with supportive family for assist as needed. Patient stated, \"I just have to get home, then I will be good. \"    [] Surgery:     [] Intubation:     [] Pain Medication:    [] Sedation:     [] Spine Precautions :    [] Medical Instability:    [] Other:    TASHA Gonzalez

## 2020-08-15 VITALS
OXYGEN SATURATION: 96 % | RESPIRATION RATE: 16 BRPM | SYSTOLIC BLOOD PRESSURE: 131 MMHG | DIASTOLIC BLOOD PRESSURE: 68 MMHG | HEART RATE: 86 BPM | WEIGHT: 165.34 LBS | HEIGHT: 70 IN | BODY MASS INDEX: 23.67 KG/M2 | TEMPERATURE: 98.2 F

## 2020-08-15 PROCEDURE — 6370000000 HC RX 637 (ALT 250 FOR IP): Performed by: STUDENT IN AN ORGANIZED HEALTH CARE EDUCATION/TRAINING PROGRAM

## 2020-08-15 PROCEDURE — 99232 SBSQ HOSP IP/OBS MODERATE 35: CPT | Performed by: FAMILY MEDICINE

## 2020-08-15 RX ADMIN — ACETAMINOPHEN 1000 MG: 500 TABLET ORAL at 05:52

## 2020-08-15 RX ADMIN — OXYCODONE HYDROCHLORIDE 5 MG: 5 TABLET ORAL at 01:39

## 2020-08-15 RX ADMIN — OXYCODONE HYDROCHLORIDE 5 MG: 5 TABLET ORAL at 05:53

## 2020-08-15 ASSESSMENT — PAIN - FUNCTIONAL ASSESSMENT: PAIN_FUNCTIONAL_ASSESSMENT: PREVENTS OR INTERFERES SOME ACTIVE ACTIVITIES AND ADLS

## 2020-08-15 ASSESSMENT — ENCOUNTER SYMPTOMS
SINUS PRESSURE: 0
WHEEZING: 0
CONSTIPATION: 0
DIARRHEA: 0
COUGH: 0
SHORTNESS OF BREATH: 0
BACK PAIN: 0
RHINORRHEA: 0
ABDOMINAL PAIN: 1
VOMITING: 0
CHEST TIGHTNESS: 0
VOICE CHANGE: 0
NAUSEA: 0
CHOKING: 0

## 2020-08-15 ASSESSMENT — PAIN DESCRIPTION - FREQUENCY: FREQUENCY: INTERMITTENT

## 2020-08-15 ASSESSMENT — PAIN SCALES - GENERAL
PAINLEVEL_OUTOF10: 5
PAINLEVEL_OUTOF10: 5
PAINLEVEL_OUTOF10: 7

## 2020-08-15 ASSESSMENT — PAIN DESCRIPTION - PROGRESSION: CLINICAL_PROGRESSION: GRADUALLY WORSENING

## 2020-08-15 ASSESSMENT — PAIN DESCRIPTION - ONSET: ONSET: GRADUAL

## 2020-08-15 ASSESSMENT — PAIN DESCRIPTION - LOCATION: LOCATION: ABDOMEN;RECTUM

## 2020-08-15 ASSESSMENT — PAIN DESCRIPTION - PAIN TYPE: TYPE: SURGICAL PAIN;ACUTE PAIN

## 2020-08-15 ASSESSMENT — PAIN DESCRIPTION - DESCRIPTORS: DESCRIPTORS: ACHING

## 2020-08-15 NOTE — PLAN OF CARE
Problem: Pain:  Goal: Pain level will decrease  Description: Pain level will decrease  8/15/2020 0428 by Mila Cohn RN  Outcome: Ongoing  Note: Assess for pain using scale of 0-10. Medicate PRN as ordered. Nonpharmacologic interventions. Continuing to monitor. 8/14/2020 1611 by Kaylene Guzman RN  Outcome: Met This Shift  Note: Patient's pain has been well controlled throughout the entire shift, please see MAR.       Problem: Pain:  Goal: Control of acute pain  Description: Control of acute pain  Outcome: Ongoing     Problem: Pain:  Goal: Control of chronic pain  Description: Control of chronic pain  Outcome: Ongoing

## 2020-08-15 NOTE — PROGRESS NOTES
[facility] - INPATIENT      Daily Progress Note     Admit Date: 8/11/2020  Bed/Room No.  1007/1007-02  Admitting Physician : Edmond Jacobs MD  Code Status :Full Code  Hospital Day:  LOS: 4 days   Chief Complaint:     Reversal of ileostomy     Active Problems:    History of reversal of ileostomy  Resolved Problems:    * No resolved hospital problems. *    Subjective : Interval History/Significant events :  08/15/20    Patient is tolerating diet . He had meat loaf last night . Patient had multiple bowel movements , no nausea or vomiting. Denies any abd pain except at incisional pain . Vitals - Stable afebrile  Labs -creatinine 0.6. Nursing notes , Consults notes reviewed. Overnight events and updates discussed with Nursing staff . Background History:         Alicia Evans is 69 y.o. male  Who was admitted to the hospital on 8/11/2020 for treatment of reversal of  ileostomy. Patient had severe abdominal pain on 6/2/2020 and underwent emergent laparotomy, lysis of adhesions, diverting loop ileostomy drainage of subhepatic abscess on 6/12/2020 for pneumoperitoneum. Patient was discharged on TPN at that time. He has history of ascending colon adenocarcinoma. Patient is otherwise healthy. He has history of IBS. Patient also had postop A. fib on a second bowel surgery with spontaneous conversion to NSR. Patient is not on long-term beta-blockers for anticoagulation. Patient underwent reversal of ileostomy on 8/11/2020 without any complications. PMH:  Past Medical History:   Diagnosis Date    [facility] [address]     Adenocarcinoma ([facility] [address]  Colon 6/17/2020    Anxiety     Cancer (HCC)     basal cell forehead    Colon polyps     [place] (hard of hearing)     Hx of cold sores     IBS (irritable bowel syndrome)     Kidney stone     Neuropathy     PTSD (post-traumatic stress disorder)     Wears glasses       Allergies:    Allergies   Allergen Reactions    Prednisone Rash and Other (See Comments) Fast heartrate        Medications :  sodium chloride flush, 10 mL, Intravenous, 2 times per day  enoxaparin, 40 mg, Subcutaneous, Daily  acetaminophen, 1,000 mg, Oral, 3 times per day        Review of Systems   Review of Systems   Constitutional: Negative for activity change, appetite change, fatigue, fever and unexpected weight change. HENT: Negative for congestion, nosebleeds, rhinorrhea, sinus pressure, sneezing and voice change. Respiratory: Negative for cough, choking, chest tightness, shortness of breath and wheezing. Cardiovascular: Negative for chest pain, palpitations and leg swelling. Gastrointestinal: Positive for abdominal pain. Negative for constipation, diarrhea, nausea and vomiting. Genitourinary: Negative for difficulty urinating, discharge, dysuria, frequency and testicular pain. Musculoskeletal: Negative for back pain. Skin: Negative for rash. Neurological: Negative for dizziness, weakness, light-headedness and headaches. Hematological: Does not bruise/bleed easily. Psychiatric/Behavioral: Negative for agitation, behavioral problems, confusion, self-injury, sleep disturbance and suicidal ideas. Objective :      Current Vitals : Temp: 98.7 °F (37.1 °C),  Pulse: 78, Resp: 18, BP: 125/69, SpO2: 95 %  Last 24 Hrs Vitals   Patient Vitals for the past 24 hrs:   BP Temp Temp src Pulse Resp SpO2   08/15/20 0408 125/69 98.7 °F (37.1 °C) Oral 78 18 95 %   08/14/20 2349 127/72 100.1 °F (37.8 °C) Oral 81 16 95 %   08/14/20 2003 129/72 97.5 °F (36.4 °C) Oral 77 18 96 %   08/14/20 1651 133/70 98.8 °F (37.1 °C) Oral 78 16 96 %   08/14/20 1146 114/64 98.6 °F (37 °C) Oral 84 18 96 %     Intake / output   08/14 0701 - 08/15 0700  In: [place] [employer] [P.O.:1005; I.V.:3554]  Out: 500 [Urine:500]  Physical Exam:  Physical Exam  Vitals signs and nursing note reviewed. Constitutional:       General: He is not in acute distress. Appearance: He is not diaphoretic.    HENT:      Head: Normocephalic and atraumatic. Nose:      Right Sinus: No maxillary sinus tenderness or frontal sinus tenderness. Left Sinus: No maxillary sinus tenderness or frontal sinus tenderness. Mouth/Throat:      Pharynx: No oropharyngeal exudate. Eyes:      General: No scleral icterus. Conjunctiva/sclera: Conjunctivae normal.      Pupils: Pupils are equal, round, and reactive to light. Neck:      Musculoskeletal: Full passive range of motion without pain and neck supple. Thyroid: No thyromegaly. Vascular: No JVD. Cardiovascular:      Rate and Rhythm: Normal rate. Rhythm irregular. Pulses:           Dorsalis pedis pulses are 2+ on the right side and 2+ on the left side. Heart sounds: Normal heart sounds. No murmur. Pulmonary:      Effort: Pulmonary effort is normal.      Breath sounds: Normal breath sounds. No wheezing or rales. Abdominal:      General: Bowel sounds are decreased. Palpations: Abdomen is soft. There is no mass. Tenderness: There is abdominal tenderness. Comments: Surgical wound with dressing in place. Healed midline surgical wound   Lymphadenopathy:      Head:      Right side of head: No submandibular adenopathy. Left side of head: No submandibular adenopathy. Cervical: No cervical adenopathy. Skin:     General: Skin is warm. Neurological:      Mental Status: He is alert and oriented to person, place, and time. Motor: No tremor. Psychiatric:         Behavior: Behavior is cooperative.            Laboratory findings:    Recent Labs     08/14/20  1720   WBC 10.8   HGB 13.4   HCT 42.5        Recent Labs     08/14/20  0751      K 4.0      CO2 23   GLUCOSE 104*   BUN 7*   CREATININE 0.60*   MG 1.8   CALCIUM 8.2*   PHOS 2.9     No results for input(s): PROT, LABALBU, LABA1C, S1IWRTW, N8TFIAZ, FT4, TSH, AST, ALT, LDH, GGT, ALKPHOS, BILITOT, BILIDIR, AMMONIA, AMYLASE, LIPASE, LACTATE, CHOL, HDL, LDLCHOLESTEROL, CHOLHDLRATIO, TRIG, VLDL, BNP, TROPONINI, CKTOTAL, CKMB, CKMBINDEX, RF, MITCH in the last 72 hours. No results found for: Dahiana Uma, [address], BLOODU, BACTERIA, NITRU, [address], LEUKOCYTESUR    Imaging / Clinical Data :-   No results found. Clinical Course : stable  Assessment and Plan  :        1. H/o Focal ascending adenocarcinoma s/p resection of tumor May 2020 -   2. H/o diverting ileostomy for subhepatic abscess 06/2020 - s/p reversal of ileostomy. Patient tolerating clears , advance diet per gen surgery, check BMP and mag, replace electrolytes as indicated. Encourage ambulation, incentive spirometry. Recommend to decrease pain medication intake. Discontinue morphine  3. H/O Post Op  Afib -in normal sinus rhythm with PAC. 4. IBS         Discharge home . arrange home care for wound check and dressing changes. Plan and updates discussed with patient ,  answers  explained to satisfaction.    Plan discussed with Staff  RN     (Please note that portions of this note were completed with a voice recognition program. Efforts were made to edit the dictations but occasionally words are mis-transcribed.)      Katarina Singleton MD  8/15/2020

## 2020-08-15 NOTE — DISCHARGE SUMMARY
Surgery Discharge Summary     Patient Identification  Ashley Giraldo is a 69 y.o. male. :  1950  Admit Date:  2020    Discharge date:   8/15/20                                   Disposition: home    Discharge Diagnoses:   Patient Active Problem List   Diagnosis    Colon polyps    Anxiety    Wears glasses    PTSD (post-traumatic stress disorder)    Neuropathy    [facility] ([facility] [address]    Essential hypertension    Hyperlipidemia    Tobacco use disorder    Pneumoperitoneum    Postoperative atrial fibrillation (HCC)    Severe malnutrition (HCC)    Neutrophilic leukocytosis    Ileostomy status (HCC)    Hypocalcemia    Hypoalbuminemia due to protein-calorie malnutrition (HCC)    Normocytic normochromic anemia    Paroxysmal atrial fibrillation (HCC)    Adenocarcinoma (HCC)  Colon    History of reversal of ileostomy       Condition on discharge: good    Consults: IM    Surgery: ILEOSTOMY CLOSURE    Patient Instructions: Activity: no heavy lifting, pushing, pulling for 6 weeks, no driving for 2 weeks or while on analgesics  Diet: As tolerated  Follow-up with Dr. Hank Rizvi in 2 weeks. See pre-printed instructions in chart and given to patient upon discharge. Discharge Medications:      Dot Gonzalez   Home Medication Instructions AFS:[id number]    Printed on:08/15/20 0825   Medication Information                      acetaminophen (TYLENOL) 500 MG tablet  Take 2 tablets by mouth every 8 hours as needed for Pain or Fever             aspirin 81 MG chewable tablet  Take 81 mg by mouth daily             clonazePAM (KLONOPIN) 0.5 MG tablet  Take 0.5 mg by mouth 2 times daily. Per pt & wife, pt takes this scheduled 2x/day             docusate sodium (COLACE) 100 MG capsule  Take 1 capsule by mouth 2 times daily as needed for Constipation             HYDROcodone-acetaminophen (NORCO) 5-325 MG per tablet  Take 1 tablet by mouth every 6 hours as needed for Pain for up to 5 days. ibuprofen (ADVIL;MOTRIN) 600 MG tablet  Take 600 mg by mouth 2 times daily as needed for Pain             Multiple Vitamins-Minerals (THERAPEUTIC MULTIVITAMIN-MINERALS) tablet  Take 1 tablet by mouth daily             ondansetron (ZOFRAN) 4 MG tablet  Take 1 tablet by mouth every 8 hours as needed for Nausea or Vomiting                  HPI and Hospital Course:   69 y.o. male presented on 8/11/2020 for ILEOSTOMY CLOSURE. Hospital course was unremarkable. On day of discharge pt was tolerating regular diet, pain controlled with oral medications and ambulating without difficulty.       Electronically signed by Celio Baig DO on 8/15/2020 at 8:25 AM

## 2020-08-15 NOTE — PROGRESS NOTES
Surgery Progress Note       PATIENT NAME: Alicia Evans     TODAY'S DATE: 8/15/2020    SUBJECTIVE:    Pt seen and examined. Denies any f/c, n/v, sob or chest pain. + flatus, + BMs. Tolerating GEN diet    OBJECTIVE:   Vitals:  /68   Pulse 86   Temp 98.2 °F (36.8 °C) (Oral)   Resp 16   Ht 5' 10\" (1.778 m)   Wt 165 lb 5.5 oz (75 kg)   SpO2 96%   BMI 23.72 kg/m²      INTAKE/OUTPUT:      Intake/Output Summary (Last 24 hours) at 8/15/2020 0823  Last data filed at 8/15/2020 0554  Gross per 24 hour   Intake 4559 ml   Output 500 ml   Net 4059 ml                 General: AOx3, NAD  Lungs: Symmetrical chest rise bilaterally, no audible wheezes or rhonchi  Heart: S1S2  Abdomen: Soft, ND, appropriately tender, non peritoneal, no rebound, incision clean and dry, packing removed   Extremity: moves all extremities x4, No edema    Data Review:  CBC:   Recent Labs     08/14/20  1720   WBC 10.8   HGB 13.4        BMP:    Recent Labs     08/14/20  0751      K 4.0      CO2 23   BUN 7*   CREATININE 0.60*   GLUCOSE 104*     Hepatic: No results for input(s): AST, ALT, ALB, ALKPHOS, BILITOT, BILIDIR in the last 72 hours. Coagulation: No results for input(s): APTT, PROT, INR in the last 72 hours. ASSESSMENT     Patient Active Problem List   Diagnosis    Colon polyps    Anxiety    Wears glasses    PTSD (post-traumatic stress disorder)    Neuropathy    [facility]-ab[address] abscess ([facility] [address]    Essential hypertension    Hyperlipidemia    Tobacco use disorder    Pneumoperitoneum    Postoperative atrial fibrillation (HCC)    Severe malnutrition (HCC)    Neutrophilic leukocytosis    Ileostomy status (HCC)    Hypocalcemia    Hypoalbuminemia due to protein-calorie malnutrition (HCC)    Normocytic normochromic anemia    Paroxysmal atrial fibrillation (HCC)    Adenocarcinoma (HCC)  Colon    History of reversal of ileostomy   68 yo M s/p 8/11/20 ileostomy reversal    PLAN  1.  Neuro- Analgesia multimodal pain therapy  2. CV- continue to monitor BP/HR, ok to resume ASA 81mg at this time if needed   3. Resp- continue to encourage incentive spirometry and deep breathing every hour while awake per RT and RN  4. GI- gen diet, monitor for bowel function, Wound care- dry dressing prn, ok to shower , repack and dress wound with fluff   5. Renal- jayne UOP, accurate I/Os, replace electrolytes PRN, IVF D5  6. Msk- continue to encourage ambulation/activity, PT/OT eval and Tx   7. ID-received 24hr Abx, jayne for fevers  8. Ppx- ok for DVT ppx- lovenox, GI ppx  9. Dispo planning: Discharge home today, scripts in chart     Thank you,    Electronically signed by Sravani Cabrera DO  on 8/15/2020 at 8:23 AM           I Dr. Oz Em saw and examined the patient. I have edited the above and agree with the above. Alejandra Wilson  Colorectal Surgery

## 2021-09-21 ENCOUNTER — HOSPITAL ENCOUNTER (OUTPATIENT)
Dept: PREADMISSION TESTING | Age: 71
Discharge: HOME | End: 2021-09-25
Payer: MEDICARE

## 2021-09-21 VITALS
RESPIRATION RATE: 20 BRPM | HEIGHT: 70 IN | WEIGHT: 187 LBS | SYSTOLIC BLOOD PRESSURE: 133 MMHG | OXYGEN SATURATION: 98 % | BODY MASS INDEX: 26.77 KG/M2 | TEMPERATURE: 98 F | HEART RATE: 68 BPM | DIASTOLIC BLOOD PRESSURE: 72 MMHG

## 2021-09-21 LAB
ANION GAP SERPL CALCULATED.3IONS-SCNC: 10 MMOL/L (ref 9–17)
BUN BLDV-MCNC: 9 MG/DL (ref 8–23)
BUN/CREAT BLD: 10 (ref 9–20)
CALCIUM SERPL-MCNC: 9.1 MG/DL (ref 8.6–10.4)
CARCINOEMBRYONIC ANTIGEN: 3 NG/ML
CHLORIDE BLD-SCNC: 104 MMOL/L (ref 98–107)
CO2: 23 MMOL/L (ref 20–31)
CREAT SERPL-MCNC: 0.94 MG/DL (ref 0.7–1.2)
GFR AFRICAN AMERICAN: >60 ML/MIN
GFR NON-AFRICAN AMERICAN: >60 ML/MIN
GFR SERPL CREATININE-BSD FRML MDRD: NORMAL ML/MIN/{1.73_M2}
GFR SERPL CREATININE-BSD FRML MDRD: NORMAL ML/MIN/{1.73_M2}
GLUCOSE BLD-MCNC: 96 MG/DL (ref 70–99)
HCT VFR BLD CALC: 48.1 % (ref 40.7–50.3)
HEMOGLOBIN: 15.6 G/DL (ref 13–17)
MCH RBC QN AUTO: 28.7 PG (ref 25.2–33.5)
MCHC RBC AUTO-ENTMCNC: 32.4 G/DL (ref 28.4–34.8)
MCV RBC AUTO: 88.4 FL (ref 82.6–102.9)
NRBC AUTOMATED: 0 PER 100 WBC
PDW BLD-RTO: 13.7 % (ref 11.8–14.4)
PLATELET # BLD: 221 K/UL (ref 138–453)
PMV BLD AUTO: 10.4 FL (ref 8.1–13.5)
POTASSIUM SERPL-SCNC: 4.3 MMOL/L (ref 3.7–5.3)
RBC # BLD: 5.44 M/UL (ref 4.21–5.77)
SODIUM BLD-SCNC: 137 MMOL/L (ref 135–144)
WBC # BLD: 11.1 K/UL (ref 3.5–11.3)

## 2021-09-21 PROCEDURE — 82378 CARCINOEMBRYONIC ANTIGEN: CPT

## 2021-09-21 PROCEDURE — 36415 COLL VENOUS BLD VENIPUNCTURE: CPT

## 2021-09-21 PROCEDURE — 80048 BASIC METABOLIC PNL TOTAL CA: CPT

## 2021-09-21 PROCEDURE — 85027 COMPLETE CBC AUTOMATED: CPT

## 2021-09-21 PROCEDURE — 93005 ELECTROCARDIOGRAM TRACING: CPT

## 2021-09-21 NOTE — PROGRESS NOTES
PAT Progress Note    Pt Name: Eunice Dakins  MRN: [id number]  YOB: 1950  Date of evaluation: 9/21/2021      [x] Called to CONNIE. I spoke to the patient, Eunice Dakins, a 66 y.o. male, who is scheduled for an upcoming colonoscopy and exploration of abdominal wound by Dr. Satinder Reese for history of colon resection, history of colon cancer, and wound infection (stitch) on 9/28/2021 at 1230. [x] I reviewed the hard copy General surgery progress note by Dr. Satinder Reese dated 9/8/2021 for an Interval History and Physical Note the day of surgery. Hard copy note placed in short chart. During patient's admission for colon resection on 5/2020, patient went into atrial fibrillation RVR. Patient was discharged on PO amiodarone. Patient states he does not see a cardiologist. He states this his primary care took him off of amiodarone and \"he doesn't have atrial fibrillation. \" EKG completed during visit.      Vital signs: /72   Pulse 68   Temp 98 °F (36.7 °C) (Oral)   Resp 20   Ht 5' 10\" (1.778 m)   Wt 187 lb (84.8 kg)   SpO2 98%   BMI 26.83 kg/m²     OFELIA Bernal CNP    Electronically signed 9/21/2021 at 3:39 PM

## 2021-09-21 NOTE — PRE-PROCEDURE INSTRUCTIONS
unless specifically instructed to. Additional Instructions:      1. If you are having any type of anesthesia, you are to have NOTHING to eat or drink after midnight the night before surgery. This includes no gum, hard candy, mints or water. The only exception to this is small sips of water to take the medications listed above. No smoking or chewing tobacco after midnight. No alcoholic beverages for 24 hours prior to surgery. 2. Brush your teeth but do not swallow any water. 3. If you wear glasses bring a case for them if you have one. No contacts should be worn the day of surgery. You may also bring your hearing aids. If you have dentures, most surgical procedures involving anesthesia will require that you remove them prior to surgery. .     5. Do not wear any jewelry or body piercings the day of surgery. No nail polish on the operative extremity (arm/hand or leg/foot surgeries). 6. If you are staying overnight with us, you may bring a small bag of necessary personal items. 7. Please wear loose, comfortable clothing. If you are potentially going to have a cast, sling, brace or bulky dressing, make sure to wear clothing that will fit over it. 8. In case of illness - If you have cold or flu like symptoms (high fever, runny nose, sore throat, cough, etc.) rash, nausea, vomiting, loose stools, and/or recent contact with someone who has a contagious disease (chicken pox, measles, COVID-19, etc.). Please call your surgeon before coming to the hospital.    Transportation After Your Surgery/Procedure: If you are going home the same day of surgery you need someone to drive you home. Your  must be at least 25years of age. A taxi cab or other nonmedical public transportation is not acceptable unless you have someone to ride home in the vehicle with you.      For your safety, someone must remain with you for the first 24 hours after your surgery if you receive anesthesia or medication. If you do not have someone to stay with you, your procedure may be cancelled. As a patient at [facility] you can expect quality medical and nursing care that is centered on you individual needs. Our goal is to make your surgical experience as comfortable as possible.     Any questions about preparing for your surgery please call [phone].      ____________________________   ____________________________  Signature (Patient)                                 Signature (Nurse)                     Date

## 2021-09-22 LAB
EKG ATRIAL RATE: 64 BPM
EKG P AXIS: 78 DEGREES
EKG P-R INTERVAL: 150 MS
EKG Q-T INTERVAL: 394 MS
EKG QRS DURATION: 78 MS
EKG QTC CALCULATION (BAZETT): 406 MS
EKG R AXIS: -17 DEGREES
EKG T AXIS: 23 DEGREES
EKG VENTRICULAR RATE: 64 BPM

## 2021-09-24 ENCOUNTER — HOSPITAL ENCOUNTER (OUTPATIENT)
Dept: LAB | Age: 71
Setting detail: SPECIMEN
Discharge: HOME | End: 2021-09-24
Payer: MEDICARE

## 2021-09-24 DIAGNOSIS — Z01.818 PREOP TESTING: Primary | ICD-10-CM

## 2021-09-24 PROCEDURE — U0005 INFEC AGEN DETEC AMPLI PROBE: HCPCS

## 2021-09-24 PROCEDURE — U0003 INFECTIOUS AGENT DETECTION BY NUCLEIC ACID (DNA OR RNA); SEVERE ACUTE RESPIRATORY SYNDROME CORONAVIRUS 2 (SARS-COV-2) (CORONAVIRUS DISEASE [COVID-19]), AMPLIFIED PROBE TECHNIQUE, MAKING USE OF HIGH THROUGHPUT TECHNOLOGIES AS DESCRIBED BY CMS-2020-01-R: HCPCS

## 2021-09-25 LAB
SARS-COV-2: NORMAL
SARS-COV-2: NOT DETECTED
SOURCE: NORMAL

## 2021-09-28 ENCOUNTER — ANESTHESIA EVENT (OUTPATIENT)
Dept: OPERATING ROOM | Age: 71
End: 2021-09-28
Payer: MEDICARE

## 2021-09-28 ENCOUNTER — HOSPITAL ENCOUNTER (OUTPATIENT)
Age: 71
Setting detail: OUTPATIENT SURGERY
Discharge: HOME | End: 2021-09-28
Attending: COLON & RECTAL SURGERY | Admitting: COLON & RECTAL SURGERY
Payer: MEDICARE

## 2021-09-28 ENCOUNTER — ANESTHESIA (OUTPATIENT)
Dept: OPERATING ROOM | Age: 71
End: 2021-09-28
Payer: MEDICARE

## 2021-09-28 VITALS
SYSTOLIC BLOOD PRESSURE: 114 MMHG | WEIGHT: 187 LBS | HEART RATE: 59 BPM | TEMPERATURE: 96.8 F | OXYGEN SATURATION: 96 % | HEIGHT: 70 IN | DIASTOLIC BLOOD PRESSURE: 67 MMHG | BODY MASS INDEX: 26.77 KG/M2 | RESPIRATION RATE: 13 BRPM

## 2021-09-28 VITALS — SYSTOLIC BLOOD PRESSURE: 101 MMHG | DIASTOLIC BLOOD PRESSURE: 59 MMHG | OXYGEN SATURATION: 99 %

## 2021-09-28 DIAGNOSIS — G89.18 ACUTE POST-OPERATIVE PAIN: Primary | ICD-10-CM

## 2021-09-28 PROCEDURE — 3700000000 HC ANESTHESIA ATTENDED CARE: Performed by: COLON & RECTAL SURGERY

## 2021-09-28 PROCEDURE — 6360000002 HC RX W HCPCS: Performed by: NURSE ANESTHETIST, CERTIFIED REGISTERED

## 2021-09-28 PROCEDURE — 3700000001 HC ADD 15 MINUTES (ANESTHESIA): Performed by: COLON & RECTAL SURGERY

## 2021-09-28 PROCEDURE — 2500000003 HC RX 250 WO HCPCS: Performed by: COLON & RECTAL SURGERY

## 2021-09-28 PROCEDURE — 3609027000 HC COLONOSCOPY: Performed by: COLON & RECTAL SURGERY

## 2021-09-28 PROCEDURE — 2500000003 HC RX 250 WO HCPCS: Performed by: NURSE ANESTHETIST, CERTIFIED REGISTERED

## 2021-09-28 PROCEDURE — 7100000011 HC PHASE II RECOVERY - ADDTL 15 MIN: Performed by: COLON & RECTAL SURGERY

## 2021-09-28 PROCEDURE — 2580000003 HC RX 258: Performed by: NURSE ANESTHETIST, CERTIFIED REGISTERED

## 2021-09-28 PROCEDURE — 2580000003 HC RX 258: Performed by: ANESTHESIOLOGY

## 2021-09-28 PROCEDURE — 2709999900 HC NON-CHARGEABLE SUPPLY: Performed by: COLON & RECTAL SURGERY

## 2021-09-28 PROCEDURE — 7100000010 HC PHASE II RECOVERY - FIRST 15 MIN: Performed by: COLON & RECTAL SURGERY

## 2021-09-28 RX ORDER — OXYCODONE HYDROCHLORIDE 5 MG/1
5 TABLET ORAL EVERY 6 HOURS PRN
Qty: 12 TABLET | Refills: 0 | Status: SHIPPED | OUTPATIENT
Start: 2021-09-28 | End: 2021-10-01

## 2021-09-28 RX ORDER — PROPOFOL 10 MG/ML
INJECTION, EMULSION INTRAVENOUS PRN
Status: DISCONTINUED | OUTPATIENT
Start: 2021-09-28 | End: 2021-09-28 | Stop reason: SDUPTHER

## 2021-09-28 RX ORDER — SODIUM CHLORIDE 9 MG/ML
25 INJECTION, SOLUTION INTRAVENOUS PRN
Status: DISCONTINUED | OUTPATIENT
Start: 2021-09-28 | End: 2021-09-28 | Stop reason: HOSPADM

## 2021-09-28 RX ORDER — SODIUM CHLORIDE 0.9 % (FLUSH) 0.9 %
10 SYRINGE (ML) INJECTION PRN
Status: DISCONTINUED | OUTPATIENT
Start: 2021-09-28 | End: 2021-09-28 | Stop reason: HOSPADM

## 2021-09-28 RX ORDER — LIDOCAINE HYDROCHLORIDE 10 MG/ML
1 INJECTION, SOLUTION EPIDURAL; INFILTRATION; INTRACAUDAL; PERINEURAL
Status: DISCONTINUED | OUTPATIENT
Start: 2021-09-28 | End: 2021-09-28 | Stop reason: HOSPADM

## 2021-09-28 RX ORDER — LIDOCAINE HYDROCHLORIDE 20 MG/ML
INJECTION, SOLUTION EPIDURAL; INFILTRATION; INTRACAUDAL; PERINEURAL PRN
Status: DISCONTINUED | OUTPATIENT
Start: 2021-09-28 | End: 2021-09-28 | Stop reason: SDUPTHER

## 2021-09-28 RX ORDER — DOCUSATE SODIUM 100 MG/1
100 CAPSULE, LIQUID FILLED ORAL 2 TIMES DAILY PRN
Qty: 20 CAPSULE | Refills: 0 | Status: SHIPPED | OUTPATIENT
Start: 2021-09-28

## 2021-09-28 RX ORDER — BUPIVACAINE HYDROCHLORIDE AND EPINEPHRINE 5; 5 MG/ML; UG/ML
INJECTION, SOLUTION EPIDURAL; INTRACAUDAL; PERINEURAL PRN
Status: DISCONTINUED | OUTPATIENT
Start: 2021-09-28 | End: 2021-09-28 | Stop reason: ALTCHOICE

## 2021-09-28 RX ORDER — SODIUM CHLORIDE, SODIUM LACTATE, POTASSIUM CHLORIDE, CALCIUM CHLORIDE 600; 310; 30; 20 MG/100ML; MG/100ML; MG/100ML; MG/100ML
INJECTION, SOLUTION INTRAVENOUS CONTINUOUS
Status: DISCONTINUED | OUTPATIENT
Start: 2021-09-28 | End: 2021-09-28 | Stop reason: HOSPADM

## 2021-09-28 RX ORDER — SODIUM CHLORIDE 0.9 % (FLUSH) 0.9 %
10 SYRINGE (ML) INJECTION EVERY 12 HOURS SCHEDULED
Status: DISCONTINUED | OUTPATIENT
Start: 2021-09-28 | End: 2021-09-28 | Stop reason: HOSPADM

## 2021-09-28 RX ORDER — SODIUM CHLORIDE, SODIUM LACTATE, POTASSIUM CHLORIDE, CALCIUM CHLORIDE 600; 310; 30; 20 MG/100ML; MG/100ML; MG/100ML; MG/100ML
INJECTION, SOLUTION INTRAVENOUS CONTINUOUS PRN
Status: DISCONTINUED | OUTPATIENT
Start: 2021-09-28 | End: 2021-09-28 | Stop reason: SDUPTHER

## 2021-09-28 RX ORDER — SODIUM CHLORIDE 9 MG/ML
INJECTION, SOLUTION INTRAVENOUS CONTINUOUS
Status: DISCONTINUED | OUTPATIENT
Start: 2021-09-28 | End: 2021-09-28

## 2021-09-28 RX ADMIN — PROPOFOL 20 MG: 10 INJECTION, EMULSION INTRAVENOUS at 12:53

## 2021-09-28 RX ADMIN — PROPOFOL 30 MG: 10 INJECTION, EMULSION INTRAVENOUS at 13:06

## 2021-09-28 RX ADMIN — SODIUM CHLORIDE, POTASSIUM CHLORIDE, SODIUM LACTATE AND CALCIUM CHLORIDE: 600; 310; 30; 20 INJECTION, SOLUTION INTRAVENOUS at 12:47

## 2021-09-28 RX ADMIN — PROPOFOL 20 MG: 10 INJECTION, EMULSION INTRAVENOUS at 12:56

## 2021-09-28 RX ADMIN — SODIUM CHLORIDE, POTASSIUM CHLORIDE, SODIUM LACTATE AND CALCIUM CHLORIDE: 600; 310; 30; 20 INJECTION, SOLUTION INTRAVENOUS at 11:52

## 2021-09-28 RX ADMIN — PROPOFOL 20 MG: 10 INJECTION, EMULSION INTRAVENOUS at 12:59

## 2021-09-28 RX ADMIN — PROPOFOL 20 MG: 10 INJECTION, EMULSION INTRAVENOUS at 13:09

## 2021-09-28 RX ADMIN — PROPOFOL 20 MG: 10 INJECTION, EMULSION INTRAVENOUS at 13:02

## 2021-09-28 RX ADMIN — LIDOCAINE HYDROCHLORIDE 100 MG: 20 INJECTION, SOLUTION EPIDURAL; INFILTRATION; INTRACAUDAL; PERINEURAL at 12:51

## 2021-09-28 RX ADMIN — PROPOFOL 50 MG: 10 INJECTION, EMULSION INTRAVENOUS at 12:51

## 2021-09-28 RX ADMIN — PROPOFOL 20 MG: 10 INJECTION, EMULSION INTRAVENOUS at 13:15

## 2021-09-28 RX ADMIN — PROPOFOL 50 MG: 10 INJECTION, EMULSION INTRAVENOUS at 13:10

## 2021-09-28 ASSESSMENT — PULMONARY FUNCTION TESTS
PIF_VALUE: 0
PIF_VALUE: 1
PIF_VALUE: 0
PIF_VALUE: 1
PIF_VALUE: 0
PIF_VALUE: 1
PIF_VALUE: 0
PIF_VALUE: 0
PIF_VALUE: 1
PIF_VALUE: 1
PIF_VALUE: 0
PIF_VALUE: 1
PIF_VALUE: 1
PIF_VALUE: 0

## 2021-09-28 ASSESSMENT — PAIN SCALES - GENERAL
PAINLEVEL_OUTOF10: 0

## 2021-09-28 ASSESSMENT — PAIN - FUNCTIONAL ASSESSMENT: PAIN_FUNCTIONAL_ASSESSMENT: 0-10

## 2021-09-28 NOTE — ANESTHESIA POSTPROCEDURE EVALUATION
Department of Anesthesiology  Postprocedure Note    Patient: Clifton Yeboah  MRN: [id number]  YOB: 1950  Date of evaluation: 9/28/2021  Time:  3:55 PM     Procedure Summary     Date: 09/28/21 Room / Location: [place] [address] / [facility] - INPATIENT    Anesthesia Start: 1247 Anesthesia Stop: 1326    Procedure: EXPLORATION OF ABDOMINAL WOUND  ( STITCH) (N/A ) Diagnosis: (H/O COLON RESECTION   WOUND INFECTION (STITCH)     H/O COLON CA)    Surgeons: Alexandra Wiseman MD Responsible Provider: Erick Iniguez DO    Anesthesia Type: MAC ASA Status: 3          Anesthesia Type: MAC    Lance Phase I: Lance Score: 10    Lance Phase II: Lance Score: 8    Last vitals: Reviewed and per EMR flowsheets.        Anesthesia Post Evaluation    Patient location during evaluation: PACU  Patient participation: complete - patient participated  Level of consciousness: awake and alert  Airway patency: patent  Nausea & Vomiting: no nausea and no vomiting  Complications: no  Cardiovascular status: hemodynamically stable  Respiratory status: acceptable  Hydration status: stable

## 2021-09-28 NOTE — H&P
Interval H&P Note    Pt Name: Aleksandr Arguelles  MRN: [id number]  YOB: 1950  Date of evaluation: 9/28/2021      [x] I have reviewed the hard copy general surgery progress note by Dr. Audie Bonilla dated 9/8/2021 labeled in short chart which meets the criteria for an Interval History and Physical note. [x] I have examined  Suman Espana  There are no changes to the patient who is scheduled for a colonoscopy and exploration of abdominal wound by Dr. Audie Bonilla for history of colon resection, history of colon cancer, and wound infection (stitch). The patient denies new health changes, fever, chills, wheezing, cough, increased SOB, chest pain, open sores or wounds. Denies hx diabetes. Last Multivitamin 9/26/2021, ASA 81mg 9/25/2021. Vital signs: /75   Pulse 85   Temp 97.5 °F (36.4 °C) (Temporal)   Resp 16   SpO2 95%     Allergies:  Prednisone    Medications:    Prior to Admission medications    Medication Sig Start Date End Date Taking? Authorizing Provider   ibuprofen (ADVIL;MOTRIN) 600 MG tablet Take 600 mg by mouth 2 times daily as needed for Pain    Historical Provider, MD   acetaminophen (TYLENOL) 500 MG tablet Take 2 tablets by mouth every 8 hours as needed for Pain or Fever 6/3/20   Angelito Gooden MD   aspirin 81 MG chewable tablet Take 81 mg by mouth daily    Historical Provider, MD   clonazePAM (KLONOPIN) 0.5 MG tablet Take 0.5 mg by mouth 2 times daily.  Per pt & wife, pt takes this scheduled 2x/day    Historical Provider, MD   Multiple Vitamins-Minerals (THERAPEUTIC MULTIVITAMIN-MINERALS) tablet Take 1 tablet by mouth daily    Historical Provider, MD           Past Medical History:     Past Medical History:   Diagnosis Date    A-fib ([address]     Adenocarcinoma ([address]  Colon 6/17/2020    Polyps removed after colonoscopy    Anxiety     Cancer ([address]     basal cell forehead    Colon polyps     [place] (hard of hearing)     Hx of cold sores     IBS (irritable bowel syndrome)     Kidney stone     Neuropathy  PTSD (post-traumatic stress disorder)     Wears glasses         Past Surgical History:     Past Surgical History:   Procedure Laterality Date    ABDOMEN SURGERY      APPENDECTOMY      COLON SURGERY      COLONOSCOPY      march 2020    COSMETIC SURGERY      forehead scar revision    HEMICOLECTOMY N/A 5/28/2020    ABDOMINAL EXPLORATION WITH EXCISION OF TRANSVERSE COLON/RIGHT COLECTOMY performed by Serina Sweeney MD at [place] [address] N/A 6/12/2020    LAPAROTOMY EXPLORATORY LYSIS OF ADHESIONS REPAIR OF SEROSAL TEARS DRAINAGE OF SUBHEPATIC ABSCESS LOOP ILEOSTOMY CREATION performed by Serina Sweeney MD at [place] [address] cell from forehead    SMALL INTESTINE SURGERY N/A 8/11/2020    ILEOSTOMY CLOSURE performed by Serina Sweeney MD at [facility] History:     Social History     Socioeconomic History    Marital status:      Spouse name: Not on file    Number of children: Not on file    Years of education: Not on file    Highest education level: Not on file   Occupational History    Not on file   Tobacco Use    Smoking status: Current Every Day Smoker     Packs/day: 1.50    Smokeless tobacco: Never Used    Tobacco comment: smokes greater than 50 years   Vaping Use    Vaping Use: Never used   Substance and Sexual Activity    Alcohol use: Never     Comment: Rare    Drug use: Never    Sexual activity: Not on file   Other Topics Concern    Not on file   Social History Narrative    Not on file     Social Determinants of Health     Financial Resource Strain:     Difficulty of Paying Living Expenses:    Food Insecurity:     Worried About [address] in the Last Year:     [address] in the Last Year:    Transportation Needs:     Lack of Transportation (Medical):      Lack of Transportation (Non-Medical):    Physical Activity:     Days of Exercise per Week:     Minutes of Exercise per Session:    Stress:     Feeling of Stress : Social Connections:     Frequency of Communication with Friends and Family:     Frequency of Social Gatherings with Friends and Family:     Attends [employer] Services:     Active Member of Clubs or Organizations:     Attends Club or Organization Meetings:     Marital Status:    Intimate Partner Violence:     Fear of Current or Ex-Partner:     Emotionally Abused:     Physically Abused:     Sexually Abused:        Family History:     Family History   Problem Relation Age of Onset    Colon Cancer Neg Hx      This is a 66 y.o. male who is pleasant, cooperative, alert and oriented x3, in no acute distress. Heart: Heart sounds are normal.  HR 85 regular rate and rhythm without murmur, gallop or rub. Lungs: Normal respiratory effort with equal expansion, good air exchange, unlabored and clear to auscultation without wheezes or rales bilaterally   Abdomen: soft, nontender, nondistended with bowel sounds active.      Labs:  Recent Labs     09/21/21  1520   HGB 15.6   HCT 48.1   WBC 11.1   MCV 88.4         K 4.3      CO2 23   BUN 9   CREATININE 0.94   GLUCOSE 96       Recent Labs     09/24/21  1500   COVID19       Not Detected       Shraddha OFELIA Alejo CNP  Electronically signed 9/28/2021 at 11:11 AM

## 2021-09-28 NOTE — ANESTHESIA PRE PROCEDURE
Department of Anesthesiology  Preprocedure Note       Name:  Wing Boland   Age:  66 y.o.  :  1950                                          MRN:  [id number]         Date:  2021      Surgeon: Castillo Sharma):  Emilia Méndez MD    Procedure: Procedure(s):  EXPLORATION OF ABDOMINAL WOUND  ( STITCH)    Medications prior to admission:   Prior to Admission medications    Medication Sig Start Date End Date Taking? Authorizing Provider   oxyCODONE (ROXICODONE) 5 MG immediate release tablet Take 1 tablet by mouth every 6 hours as needed for Pain for up to 3 days. Intended supply: 3 days. Take lowest dose possible to manage pain 9/28/21 10/1/21 Yes Kayy Huggins DO   docusate sodium (COLACE) 100 MG capsule Take 1 capsule by mouth 2 times daily as needed for Constipation 21  Yes Kayy Huggins DO   acetaminophen (TYLENOL) 500 MG tablet Take 2 tablets by mouth every 8 hours as needed for Pain or Fever 6/3/20  Yes Varun Garcia MD   clonazePAM (KLONOPIN) 0.5 MG tablet Take 0.5 mg by mouth 2 times daily.  Per pt & wife, pt takes this scheduled 2x/day   Yes Historical Provider, MD   Multiple Vitamins-Minerals (THERAPEUTIC MULTIVITAMIN-MINERALS) tablet Take 1 tablet by mouth daily   Yes Historical Provider, MD   ibuprofen (ADVIL;MOTRIN) 600 MG tablet Take 600 mg by mouth 2 times daily as needed for Pain    Historical Provider, MD   aspirin 81 MG chewable tablet Take 81 mg by mouth daily    Historical Provider, MD       Current medications:    Current Facility-Administered Medications   Medication Dose Route Frequency Provider Last Rate Last Admin    lactated ringers infusion   IntraVENous Continuous Fabiola Royal  mL/hr at 21 1152 New Bag at 21 1152    sodium chloride flush 0.9 % injection 10 mL  10 mL IntraVENous 2 times per day Fabiola Royal MD        sodium chloride flush 0.9 % injection 10 mL  10 mL IntraVENous PRN Fabiola Royal MD        0.9 % sodium chloride infusion  25 mL IntraVENous PRN Alberto HUGGINS Liam Ann MD        lidocaine PF 1 % injection 1 mL  1 mL IntraDERmal Once PRN Arsenio Mcgill MD         Current Outpatient Medications   Medication Sig Dispense Refill    oxyCODONE (ROXICODONE) 5 MG immediate release tablet Take 1 tablet by mouth every 6 hours as needed for Pain for up to 3 days. Intended supply: 3 days. Take lowest dose possible to manage pain 12 tablet 0    docusate sodium (COLACE) 100 MG capsule Take 1 capsule by mouth 2 times daily as needed for Constipation 20 capsule 0    acetaminophen (TYLENOL) 500 MG tablet Take 2 tablets by mouth every 8 hours as needed for Pain or Fever 120 tablet 3    clonazePAM (KLONOPIN) 0.5 MG tablet Take 0.5 mg by mouth 2 times daily. Per pt & wife, pt takes this scheduled 2x/day      Multiple Vitamins-Minerals (THERAPEUTIC MULTIVITAMIN-MINERALS) tablet Take 1 tablet by mouth daily      ibuprofen (ADVIL;MOTRIN) 600 MG tablet Take 600 mg by mouth 2 times daily as needed for Pain      aspirin 81 MG chewable tablet Take 81 mg by mouth daily         Allergies:     Allergies   Allergen Reactions    Prednisone Rash and Other (See Comments)     Fast heartrate         Problem List:    Patient Active Problem List   Diagnosis Code    Colon polyps K63.5    Anxiety F41.9    Wears glasses Z97.3    PTSD (post-traumatic stress disorder) F43.10    Neuropathy G62.9    Intra-abdominal abscess ([facility] [address] K65.1    Essential hypertension I10    Hyperlipidemia E78.5    Tobacco use disorder F17.200    Pneumoperitoneum K66.8    Postoperative atrial fibrillation (HCC) I97.89, I48.91    Severe malnutrition (HCC) U96    Neutrophilic leukocytosis V98.0    Ileostomy status ([facility] [address] Z93.2    Hypocalcemia E83.51    Hypoalbuminemia due to protein-calorie malnutrition (HCC) E88.09, E46    Normocytic normochromic anemia D64.9    Paroxysmal atrial fibrillation (HCC) I48.0    Adenocarcinoma (HCC)  Colon C80.1    History of reversal of ileostomy Z98.890       Past Medical History: Time of last solid consumption: 2000                        Date of last liquid consumption: 09/27/21                        Date of last solid food consumption: 09/26/21    BMI:   Wt Readings from Last 3 Encounters:   09/28/21 187 lb (84.8 kg)   09/21/21 187 lb (84.8 kg)   08/11/20 165 lb 5.5 oz (75 kg)     Body mass index is 26.83 kg/m². CBC:   Lab Results   Component Value Date    WBC 11.1 09/21/2021    RBC 5.44 09/21/2021    HGB 15.6 09/21/2021    HCT 48.1 09/21/2021    MCV 88.4 09/21/2021    RDW 13.7 09/21/2021     09/21/2021       CMP:   Lab Results   Component Value Date     09/21/2021    K 4.3 09/21/2021     09/21/2021    CO2 23 09/21/2021    BUN 9 09/21/2021    CREATININE 0.94 09/21/2021    GFRAA >60 09/21/2021    LABGLOM >60 09/21/2021    GLUCOSE 96 09/21/2021    PROT 5.2 06/14/2020    CALCIUM 9.1 09/21/2021    BILITOT 0.22 06/14/2020    ALKPHOS 63 06/14/2020    AST 17 06/14/2020    ALT 14 06/14/2020       POC Tests: No results for input(s): POCGLU, POCNA, POCK, POCCL, POCBUN, POCHEMO, POCHCT in the last 72 hours.     Coags:   Lab Results   Component Value Date    PROTIME 15.5 05/30/2020    INR 1.3 05/30/2020    APTT 31.0 05/30/2020       HCG (If Applicable): No results found for: PREGTESTUR, PREGSERUM, HCG, HCGQUANT     ABGs: No results found for: PHART, PO2ART, YXR9MZN, MKB2JDM, BEART, U4PERXHK     Type & Screen (If Applicable):  No results found for: LABABO, LABRH    Drug/Infectious Status (If Applicable):  No results found for: HIV, HEPCAB    COVID-19 Screening (If Applicable):   Lab Results   Component Value Date    COVID19 Not Detected 09/24/2021    COVID19 Not Detected 08/07/2020           Anesthesia Evaluation  Patient summary reviewed and Nursing notes reviewed no history of anesthetic complications:   Airway: Mallampati: II  TM distance: >3 FB   Neck ROM: full  Mouth opening: > = 3 FB Dental: normal exam         Pulmonary:normal exam Cardiovascular:  Exercise tolerance: no interval change,   (+) hypertension:, dysrhythmias: atrial fibrillation,           Rate: normal                    Neuro/Psych:   (+) psychiatric history:            GI/Hepatic/Renal:   (+) renal disease: kidney stones,           Endo/Other:    (+) malignancy/cancer. Abdominal:             Vascular: Other Findings:             Anesthesia Plan      MAC and general     ASA 3       Induction: intravenous. MIPS: Prophylactic antiemetics administered. Anesthetic plan and risks discussed with patient. Plan discussed with CRNA.     Attending anesthesiologist reviewed and agrees with Preprocedure content              Adela Smith DO   9/28/2021

## 2021-09-28 NOTE — OP NOTE
Operative Note      Patient: Elaina Francisco  YOB: 1950  MRN: [id number]    Date of Procedure: 9/28/2021    Pre-Op Diagnosis: H/O COLON RESECTION, H/O COLON CA, NONHEALING ABDOMINAL WOUND    Post-Op Diagnosis: Same     Procedure:  Screening colonoscopy  Superficial abdominal wound exploration with removal of foreign body       Surgeon(s):  Michael Sims MD    Assistant:   Resident: Dana Alvarado DO    Anesthesia: General    Estimated Blood Loss (mL): 3    Complications: None    Specimens:   None  Implants:  None  Drains: None    Findings: Normal colonoscopy, suture granuloma excised    History: Patient is a 71-year-old male who underwent extended right hemicolectomy for colon cancer complicated by subhepatic abscess and reexploration with creation of a protective loop ileostomy, which was subsequently reversed last August.  Patient continues to have a nonhealing wound at the site of the ileostomy reversal.  Therefore, decision was made to plan for wound exploration at time of colonoscopy. This procedure, including risks, benefits, alternatives and complications have been fully reviewed with the patient and informed consent was obtained. All questions were answered appropriately. Detailed Description of Procedure: Patient was brought to the operating room and was placed in a left lateral decubitus position. Patient was given monitored anesthesia care by anesthesia. No perioperative antibiotics were given. A timeout was performed to confirm the patient, procedure, allergies and all the concerns. The patient was given 4 L of O2 /minute by nasal cannula. The patient's SPO2 remained above 90% throughout the procedure. The colonoscope was inserted per rectum and advanced under direct vision to the cecum without difficulty.      Findings:    Transverse colon: normal    Descending/Sigmoid colon: normal    Rectum/Anus: examined in normal and retroflexed positions and was normal    The colon was decompressed and the scope was removed. The patient was placed supine and the abdomen was exposed. The right lower quadrant area was prepped with Betadine and draped in the usual sterile fashion. There were 2 small granulomatous areas at the prior ileostomy site. 0.5% Marcaine with epinephrine was injected into the area for local anesthesia. We extended the first nonhealing wound with a #15 blade and identified a blue suture that was causing the inflammatory reaction and inhibiting proper wound healing. The suture was cut and removed. In similar fashion the second granulomatous wound was extended using a #15 blade and additionally identified a blue Prolene suture which was also excised with scissors. Both wounds were approximated using 3-0 Monocryl suture in an interrupted fashion. Bacitracin was applied followed by fluffs and ABD and secured with paper tape. This concluded the procedure. All sponge, needle and instrument counts were correct at the end the case. Patient tolerated the procedure well and was transferred to PACU in stable condition. Dr. Tree Collado was present for the entire procedure. Electronically signed by Justus Morse DO on 9/28/2021 at 1:43 PM  I Dr. Tree Collado was present throughout and performed the entire procedure. Alejandra Wilson  Colorectal Surgery

## 2024-01-18 NOTE — ED NOTES
Doctor Tom Martinez notifed of patients concerns and states that he will see patient within the hour.       Hermelindo Morin RN  06/12/20 7130 Outpatient Wound Clinician Visit Note    Chief Complaint:   Chief Complaint   Patient presents with    Wound     Right Heel ulcer       Home Care Service: [facility]    Fax [phone]     Type of Supervision: Patient seen by provider    Was care transitioned to this department today? Yes  Was care transitioned from this department today?  Yes   Hospitalization within the last 30 days (if yes, date of discharge)? No     Arrival Disposition: Walker  Transfer Assist: 1 person assist  Special Needs: Special Needs List: none    Comments:  Patient arrival information, vital signs and dressing removed by staff member noted.  Staff obtained consents, records, test results or processed orders.  Patient and Caregiver education was given regarding procedures/therapy planned.  Weight was obtained in clinic.    Additional POCT Services Provided: None      Assessment:  Wound Heel Right Pressure Injury (Active)   Date First Assessed/Time First Assessed: 01/21/23 0908   Present on Original Admission: Yes  Location: Heel  Laterality: Right  Level of Skin Injury: Full Thickness  Primary Wound Type: Pressure Injury  Initial Pressure Injury Stage: Unstageable  Woun...      Assessments 1/17/2024  1:00 PM   Wound Image      Dressing Assessment Intact;Drainage present   Dressing Activity Changed   Dressing Changed On   01/17/24   Wound Exudate Small;Serous   Cleansing Agent Hypochlorous acid (e.g. Vashe)   Wound Bed/Tissue Type Granulated;Non-granulated;Necrotic tissue, slough   Periwound Condition Callus;Macerated   Wound Edge Attached to wound bed   Wound Status Unchanged   Wound Last Measured 01/17/24   Wound Length (cm) 0.3 cm   Wound Width (cm) 0.5 cm   Wound Depth (cm) 0.1 cm   Wound Surface Area (cm^2) 0.15 cm^2   Wound Volume (cm^3) 0.015 cm^3   PhotoTaken? Yes   Post-Procedure Length (cm) 0.3 cm   Post-Procedure Width (cm) 0.5 cm   Post-Procedure Surface Area (cm^2) 0.15 cm^2   Wound Volume Change (Initial) -1.99 cm3   Wound  Volume % Change (Initial) -99.25 %   Wound Volume Change (30 days) -0.01 cm3   Wound Volume % Change (30 days) -46.43 %       Wound Chest Left Puncture (Active)   Date First Assessed: 01/15/24   Present on Original Admission: Yes  Location: (c) Chest  Laterality: Left  Primary Wound Type: Puncture      No assessment data to display        Plan:  The below orders were released and performed during the visit:   Complete Wound Care  Every visit  Diagnosis: Pressure injury (specify)  Diagnosis: Other (specify)  Pressure Injury (specify): Stage 4  Other (specify): surgical wound left foot, pressure ulcer right heel  Dressing change(s) to be done by: Wound Care Team  Dressing change(s) to be done by: Other (specify)  Other (specify): Home Health Nurse  Dressing frequency: Two times/week (specify)  Two times/week: Monday  Two times/week: Thursday  Wound location: R heel  Dressing change(s) to be done using: Clean Technique  Clean wound with: Normal saline  Clean wound with: Wound cleanser  Dressing type: Other (specify)  Other (specify): Collagen (lucie ag),Alginate, border foam, tape    Order Comments:  Apply lidocaine 2% gel to ulcer bed as needed for patient comfort or predebridement.     Interventions:    Wound Treatment and Goals (most recent)       Continue same treatment.                 Clinical  Procedures performed this visit:  Exicional debridement performed this visit .    Departure Instruction: Simple D/C (Rx, simple instructions) and Patient Process: Simple    Departure Disposition: Depart without assistance and Home self care or family care    Follow Up  Return in about 2 weeks (around 1/31/2024) for Dr Arslan Mueller.

## (undated) DEVICE — TOTAL TRAY, DB, 100% SILI FOLEY, 16FR 10: Brand: MEDLINE

## (undated) DEVICE — STAPLER INT L75MM CUT LN L73MM STPL LN L77MM BLU B FRM 8

## (undated) DEVICE — COVER LT HNDL BLU PLAS

## (undated) DEVICE — PROTECTOR ULN NRV PUR FOAM HK LOOP STRP ANATOMICALLY

## (undated) DEVICE — GARMENT,MEDLINE,DVT,INT,CALF,MED, GEN2: Brand: MEDLINE

## (undated) DEVICE — SUTURE PROL SZ 1 L30IN NONABSORBABLE BLU L36MM CT-1 1/2 CIR 8425H

## (undated) DEVICE — BLADE ES L6IN ELASTOMERIC COAT EXT DURABLE BEND UPTO 90DEG

## (undated) DEVICE — GOWN,SIRUS,POLYRNF,SETINSLV,XL,20/CS: Brand: MEDLINE

## (undated) DEVICE — TOWEL,OR,DSP,ST,BLUE,DLX,XR,4/PK,20PK/CS: Brand: MEDLINE

## (undated) DEVICE — Z DISCONTINUED USE 2624853 GLOVE SURG SZ 75 L12IN THK91MIL BRN LTX FREE

## (undated) DEVICE — GLOVE SURG SZ 75 L12IN FNGR THK79MIL GRN LTX FREE

## (undated) DEVICE — GOWN,SIRUS,NON REINFRCD,LARGE,SET IN SL: Brand: MEDLINE

## (undated) DEVICE — SUTURE PERMAHAND SZ 2-0 L30IN NONABSORBABLE BLK SILK W/O A305H

## (undated) DEVICE — RELOAD STPL L75MM OPN H3.8MM CLS 1.5MM WIRE DIA0.2MM REG

## (undated) DEVICE — SURGICAL PROCEDURE KIT BASIN MAJ SET UP

## (undated) DEVICE — Device

## (undated) DEVICE — 3M™ IOBAN™ 2 ANTIMICROBIAL INCISE DRAPE 6650EZ: Brand: IOBAN™ 2

## (undated) DEVICE — BLANKET WRM W29.9XL79.1IN UP BODY FORC AIR MISTRAL-AIR

## (undated) DEVICE — POUCH OST L12IN STOMA DIA19-64MM TRNSPAR PRECUT OPN 1 SIDE

## (undated) DEVICE — PAD,ABDOMINAL,5"X9",ST,LF,25/BX: Brand: MEDLINE INDUSTRIES, INC.

## (undated) DEVICE — YANKAUER,POOLE TIP,STERILE,50/CS: Brand: MEDLINE

## (undated) DEVICE — DRAPE IRRIG FLD WRM W44XL44IN W/ AORN STD PRTBL INTRATEMP

## (undated) DEVICE — STRAP ARMBRD W1.5XL32IN FOAM STR YET SFT W/ HK AND LOOP

## (undated) DEVICE — SPONGE LAP W18XL18IN WHT COT 4 PLY FLD STRUNG RADPQ DISP ST

## (undated) DEVICE — BLADE CLIPPER GEN PURP NS

## (undated) DEVICE — PUMP,MEDLINE,DVT,SEQUENTIAL, GEN2: Brand: MEDLINE

## (undated) DEVICE — TRAY PREP DRY W/ PREM GLV 2 APPL 6 SPNG 2 UNDPD 1 OVERWRAP

## (undated) DEVICE — SEALER ENDOSCP NANO COAT OPN DIV CRV L JAW LIGASURE IMPACT

## (undated) DEVICE — GLOVE SURG 7.5 11.7IN BEAD CUF LIGHT BRN SENSICARE LTX FREE

## (undated) DEVICE — YANKAUER,FLEXIBLE HANDLE,REGLR CAPACITY: Brand: MEDLINE INDUSTRIES, INC.

## (undated) DEVICE — 3M™ STERI-STRIP™ COMPOUND BENZOIN TINCTURE 40 BAGS/CARTON 4 CARTONS/CASE C1544: Brand: 3M™ STERI-STRIP™

## (undated) DEVICE — SUTURE ETHIB EXCL BR SUTUPAK 4-0 30 X303H

## (undated) DEVICE — GLOVE SURG SZ 8 CRM LTX FREE POLYISOPRENE POLYMER BEAD ANTI

## (undated) DEVICE — PACK PROCEDURE SURG FACILITY SPEC GI BWL SPT SVMMC

## (undated) DEVICE — BANDAGE,GAUZE,BULKEE II,4.5"X4.1YD,STRL: Brand: MEDLINE

## (undated) DEVICE — APPLICATOR MEDICATED 26 CC SOLUTION HI LT ORNG CHLORAPREP

## (undated) DEVICE — CHLORAPREP 26ML ORANGE

## (undated) DEVICE — CANISTER, RIGID, 1200CC: Brand: MEDLINE INDUSTRIES, INC.

## (undated) DEVICE — SUTURE CHROMIC GUT SZ 0 L27IN ABSRB BRN L36MM CT-1 1/2 CIR 812H

## (undated) DEVICE — SUTURE PERMAHAND SZ 4-0 L18IN NONABSORBABLE BLK L26MM SH C014D

## (undated) DEVICE — OSTOMY POUCH CLAMP: Brand: HOLLISTER

## (undated) DEVICE — TUBING, SUCTION, 1/4" X 12', STRAIGHT: Brand: MEDLINE

## (undated) DEVICE — DRAPE,REIN 53X77,STERILE: Brand: MEDLINE

## (undated) DEVICE — INTENDED FOR TISSUE SEPARATION, AND OTHER PROCEDURES THAT REQUIRE A SHARP SURGICAL BLADE TO PUNCTURE OR CUT.: Brand: BARD-PARKER ® CARBON RIB-BACK BLADES

## (undated) DEVICE — STAPLE REMOVER TRAY: Brand: MEDLINE INDUSTRIES, INC.

## (undated) DEVICE — SUTURE VCRL SZ 0 L36IN ABSRB UD L36MM CT-1 1/2 CIR J946H

## (undated) DEVICE — GOWN,SIRUS,NONRNF,SETINSLV,XL,20/CS: Brand: MEDLINE

## (undated) DEVICE — SUTURE PERMAHAND SZ 4-0 L18IN NONABSORBABLE BLK SILK BRAID A183H

## (undated) DEVICE — SUTURE VCRL SZ 4-0 L27IN ABSRB UD L26MM SH 1/2 CIR J415H

## (undated) DEVICE — SUTURE PERMAHAND SZ 3-0 L30IN NONABSORBABLE BLK SILK BRAID A304H

## (undated) DEVICE — STAPLER INT L55MM CUT LN L53MM STPL LN L57MM BLU B FRM 8

## (undated) DEVICE — DRESSING TRNSPAR W2XL2.75IN FLM SHT SEMIPERMEABLE WIND

## (undated) DEVICE — CLIPVAC PRESURG HAIR REMOVAL

## (undated) DEVICE — GAUZE,PACKING STRIP,IODOFORM,1/2"X5YD,ST: Brand: CURAD

## (undated) DEVICE — GLOVE SURG SZ 7 CRM LTX FREE POLYISOPRENE POLYMER BEAD ANTI

## (undated) DEVICE — DRESSING PETRO W3XL8IN OIL EMUL N ADH GZ KNIT IMPREG CELOS

## (undated) DEVICE — SUTURE PERMA-HAND SZ 2-0 L30IN NONABSORBABLE BLK L26MM SH K833H

## (undated) DEVICE — SUTURE ETHBND EXCEL SZ 0 L30IN NONABSORBABLE GRN CT1 L36MM X424H

## (undated) DEVICE — GLOVE SURG SZ 75 CRM LTX FREE POLYISOPRENE POLYMER BEAD ANTI

## (undated) DEVICE — Z DISCONTINUED GLOVE SURG SZ 7 L12IN FNGR THK13MIL WHT ISOLEX POLYISOPRENE

## (undated) DEVICE — STAPLER INT L60MM REG TISS BLU B FRM 8 FIRING 2 ROW AUTO

## (undated) DEVICE — SUTURE PDS II SZ 0 L60IN ABSRB VLT L65MM TP-1 1/2 CIR Z991G

## (undated) DEVICE — SKIN PREP TRAY W/CHG: Brand: MEDLINE INDUSTRIES, INC.

## (undated) DEVICE — NEEDLE HYPO 25GA L1.5IN BLU POLYPR HUB S STL REG BVL STR

## (undated) DEVICE — RELOAD STPL L55MM OPN H3.8MM CLS H1.5MM WIRE DIA0.2MM REG

## (undated) DEVICE — GAUZE,SPONGE,FLUFF,6"X6.75",STRL,5/TRAY: Brand: MEDLINE

## (undated) DEVICE — GLOVE SURG SZ 65 CRM LTX FREE POLYISOPRENE POLYMER BEAD ANTI